# Patient Record
Sex: MALE | Race: BLACK OR AFRICAN AMERICAN | Employment: OTHER | URBAN - METROPOLITAN AREA
[De-identification: names, ages, dates, MRNs, and addresses within clinical notes are randomized per-mention and may not be internally consistent; named-entity substitution may affect disease eponyms.]

---

## 2017-06-21 ENCOUNTER — TELEPHONE (OUTPATIENT)
Dept: CARDIOTHORACIC SURGERY | Age: 67
End: 2017-06-21

## 2017-06-21 DIAGNOSIS — I71.20 THORACIC AORTIC ANEURYSM WITHOUT RUPTURE: Primary | ICD-10-CM

## 2017-06-30 ENCOUNTER — HOSPITAL ENCOUNTER (OUTPATIENT)
Dept: CT IMAGING | Age: 67
Discharge: HOME | End: 2017-06-30
Attending: THORACIC SURGERY (CARDIOTHORACIC VASCULAR SURGERY) | Admitting: THORACIC SURGERY (CARDIOTHORACIC VASCULAR SURGERY)

## 2017-06-30 DIAGNOSIS — I71.20 THORACIC AORTIC ANEURYSM WITHOUT RUPTURE: ICD-10-CM

## 2017-06-30 LAB
BUN BLDV-MCNC: 17 MG/DL (ref 7–20)
CREAT SERPL-MCNC: 0.9 MG/DL (ref 0.8–1.3)
GFR AFRICAN AMERICAN: >60
GFR NON-AFRICAN AMERICAN: >60

## 2017-08-30 ENCOUNTER — TELEPHONE (OUTPATIENT)
Dept: CARDIOTHORACIC SURGERY | Age: 67
End: 2017-08-30

## 2018-10-09 ENCOUNTER — HOSPITAL ENCOUNTER (EMERGENCY)
Age: 68
Discharge: HOME | End: 2018-10-10
Attending: EMERGENCY MEDICINE
Payer: COMMERCIAL

## 2018-10-09 ENCOUNTER — APPOINTMENT (OUTPATIENT)
Dept: CT IMAGING | Age: 68
End: 2018-10-09
Payer: COMMERCIAL

## 2018-10-09 DIAGNOSIS — R11.2 NAUSEA AND VOMITING, INTRACTABILITY OF VOMITING NOT SPECIFIED, UNSPECIFIED VOMITING TYPE: ICD-10-CM

## 2018-10-09 DIAGNOSIS — K52.9 COLITIS: Primary | ICD-10-CM

## 2018-10-09 LAB
A/G RATIO: 1.5 (ref 1.1–2.2)
ALBUMIN SERPL-MCNC: 4.5 G/DL (ref 3.4–5)
ALP BLD-CCNC: 86 U/L (ref 40–129)
ALT SERPL-CCNC: 26 U/L (ref 10–40)
ANION GAP SERPL CALCULATED.3IONS-SCNC: 13 MMOL/L (ref 3–16)
AST SERPL-CCNC: 24 U/L (ref 15–37)
BASOPHILS ABSOLUTE: 0 K/UL (ref 0–0.2)
BASOPHILS RELATIVE PERCENT: 0.3 %
BILIRUB SERPL-MCNC: <0.2 MG/DL (ref 0–1)
BUN BLDV-MCNC: <2 MG/DL (ref 7–20)
CALCIUM SERPL-MCNC: 9.3 MG/DL (ref 8.3–10.6)
CHLORIDE BLD-SCNC: 102 MMOL/L (ref 99–110)
CO2: 27 MMOL/L (ref 21–32)
CREAT SERPL-MCNC: 1.1 MG/DL (ref 0.8–1.3)
EOSINOPHILS ABSOLUTE: 0 K/UL (ref 0–0.6)
EOSINOPHILS RELATIVE PERCENT: 0.3 %
GFR AFRICAN AMERICAN: >60
GFR NON-AFRICAN AMERICAN: >60
GLOBULIN: 3 G/DL
GLUCOSE BLD-MCNC: 121 MG/DL (ref 70–99)
HCT VFR BLD CALC: 37.1 % (ref 40.5–52.5)
HEMOGLOBIN: 12.4 G/DL (ref 13.5–17.5)
LACTIC ACID: 2 MMOL/L (ref 0.4–2)
LIPASE: 25 U/L (ref 13–60)
LYMPHOCYTES ABSOLUTE: 0.2 K/UL (ref 1–5.1)
LYMPHOCYTES RELATIVE PERCENT: 2.3 %
MCH RBC QN AUTO: 30.6 PG (ref 26–34)
MCHC RBC AUTO-ENTMCNC: 33.4 G/DL (ref 31–36)
MCV RBC AUTO: 91.4 FL (ref 80–100)
MONOCYTES ABSOLUTE: 0.5 K/UL (ref 0–1.3)
MONOCYTES RELATIVE PERCENT: 5.3 %
NEUTROPHILS ABSOLUTE: 8.9 K/UL (ref 1.7–7.7)
NEUTROPHILS RELATIVE PERCENT: 91.8 %
PDW BLD-RTO: 14 % (ref 12.4–15.4)
PLATELET # BLD: 113 K/UL (ref 135–450)
PMV BLD AUTO: 9.5 FL (ref 5–10.5)
POTASSIUM REFLEX MAGNESIUM: 4.1 MMOL/L (ref 3.5–5.1)
RBC # BLD: 4.06 M/UL (ref 4.2–5.9)
REASON FOR REJECTION: NORMAL
REJECTED TEST: NORMAL
SODIUM BLD-SCNC: 142 MMOL/L (ref 136–145)
TOTAL PROTEIN: 7.5 G/DL (ref 6.4–8.2)
WBC # BLD: 9.7 K/UL (ref 4–11)

## 2018-10-09 PROCEDURE — 96375 TX/PRO/DX INJ NEW DRUG ADDON: CPT

## 2018-10-09 PROCEDURE — 74177 CT ABD & PELVIS W/CONTRAST: CPT

## 2018-10-09 PROCEDURE — 36415 COLL VENOUS BLD VENIPUNCTURE: CPT

## 2018-10-09 PROCEDURE — 6360000002 HC RX W HCPCS: Performed by: PHYSICIAN ASSISTANT

## 2018-10-09 PROCEDURE — 6360000004 HC RX CONTRAST MEDICATION: Performed by: EMERGENCY MEDICINE

## 2018-10-09 PROCEDURE — 83690 ASSAY OF LIPASE: CPT

## 2018-10-09 PROCEDURE — S0028 INJECTION, FAMOTIDINE, 20 MG: HCPCS | Performed by: PHYSICIAN ASSISTANT

## 2018-10-09 PROCEDURE — 85025 COMPLETE CBC W/AUTO DIFF WBC: CPT

## 2018-10-09 PROCEDURE — 80053 COMPREHEN METABOLIC PANEL: CPT

## 2018-10-09 PROCEDURE — 99285 EMERGENCY DEPT VISIT HI MDM: CPT

## 2018-10-09 PROCEDURE — 2580000003 HC RX 258: Performed by: PHYSICIAN ASSISTANT

## 2018-10-09 PROCEDURE — 96374 THER/PROPH/DIAG INJ IV PUSH: CPT

## 2018-10-09 PROCEDURE — 2500000003 HC RX 250 WO HCPCS: Performed by: PHYSICIAN ASSISTANT

## 2018-10-09 PROCEDURE — 83605 ASSAY OF LACTIC ACID: CPT

## 2018-10-09 RX ORDER — ONDANSETRON 2 MG/ML
4 INJECTION INTRAMUSCULAR; INTRAVENOUS ONCE
Status: COMPLETED | OUTPATIENT
Start: 2018-10-09 | End: 2018-10-09

## 2018-10-09 RX ORDER — 0.9 % SODIUM CHLORIDE 0.9 %
1000 INTRAVENOUS SOLUTION INTRAVENOUS ONCE
Status: COMPLETED | OUTPATIENT
Start: 2018-10-09 | End: 2018-10-10

## 2018-10-09 RX ADMIN — ONDANSETRON HYDROCHLORIDE 4 MG: 2 SOLUTION INTRAMUSCULAR; INTRAVENOUS at 22:28

## 2018-10-09 RX ADMIN — IOPAMIDOL 75 ML: 755 INJECTION, SOLUTION INTRAVENOUS at 22:54

## 2018-10-09 RX ADMIN — FAMOTIDINE 20 MG: 10 INJECTION, SOLUTION INTRAVENOUS at 22:28

## 2018-10-09 RX ADMIN — SODIUM CHLORIDE 1000 ML: 9 INJECTION, SOLUTION INTRAVENOUS at 22:28

## 2018-10-09 ASSESSMENT — PAIN DESCRIPTION - LOCATION: LOCATION: ABDOMEN

## 2018-10-10 VITALS
HEART RATE: 85 BPM | BODY MASS INDEX: 22.43 KG/M2 | DIASTOLIC BLOOD PRESSURE: 77 MMHG | RESPIRATION RATE: 14 BRPM | OXYGEN SATURATION: 90 % | WEIGHT: 148 LBS | TEMPERATURE: 97.8 F | HEIGHT: 68 IN | SYSTOLIC BLOOD PRESSURE: 139 MMHG

## 2018-10-10 PROCEDURE — 6370000000 HC RX 637 (ALT 250 FOR IP): Performed by: EMERGENCY MEDICINE

## 2018-10-10 RX ORDER — DICYCLOMINE HYDROCHLORIDE 10 MG/1
20 CAPSULE ORAL EVERY 6 HOURS PRN
Qty: 20 CAPSULE | Refills: 0 | Status: SHIPPED | OUTPATIENT
Start: 2018-10-10 | End: 2021-04-29 | Stop reason: ALTCHOICE

## 2018-10-10 RX ORDER — ONDANSETRON 4 MG/1
4 TABLET, FILM COATED ORAL EVERY 8 HOURS PRN
Qty: 10 TABLET | Refills: 0 | Status: SHIPPED | OUTPATIENT
Start: 2018-10-10 | End: 2021-04-29 | Stop reason: ALTCHOICE

## 2018-10-10 RX ORDER — CIPROFLOXACIN 500 MG/1
500 TABLET, FILM COATED ORAL 2 TIMES DAILY
Qty: 14 TABLET | Refills: 0 | Status: SHIPPED | OUTPATIENT
Start: 2018-10-10 | End: 2018-10-17

## 2018-10-10 RX ORDER — CIPROFLOXACIN 500 MG/1
500 TABLET, FILM COATED ORAL ONCE
Status: COMPLETED | OUTPATIENT
Start: 2018-10-10 | End: 2018-10-10

## 2018-10-10 RX ORDER — METRONIDAZOLE 500 MG/1
500 TABLET ORAL 3 TIMES DAILY
Qty: 21 TABLET | Refills: 0 | Status: SHIPPED | OUTPATIENT
Start: 2018-10-10 | End: 2018-10-17

## 2018-10-10 RX ORDER — METRONIDAZOLE 250 MG/1
500 TABLET ORAL ONCE
Status: COMPLETED | OUTPATIENT
Start: 2018-10-10 | End: 2018-10-10

## 2018-10-10 RX ADMIN — CIPROFLOXACIN 500 MG: 500 TABLET, FILM COATED ORAL at 00:41

## 2018-10-10 RX ADMIN — METRONIDAZOLE 500 MG: 250 TABLET, FILM COATED ORAL at 00:41

## 2018-10-10 ASSESSMENT — ENCOUNTER SYMPTOMS
ABDOMINAL PAIN: 0
NAUSEA: 1
RESPIRATORY NEGATIVE: 1
SHORTNESS OF BREATH: 0
DIARRHEA: 1
VOMITING: 1
COLOR CHANGE: 0
COUGH: 0
CONSTIPATION: 0

## 2019-08-25 ENCOUNTER — HOSPITAL ENCOUNTER (EMERGENCY)
Age: 69
Discharge: HOME | End: 2019-08-25
Attending: EMERGENCY MEDICINE
Payer: COMMERCIAL

## 2019-08-25 ENCOUNTER — APPOINTMENT (OUTPATIENT)
Dept: CT IMAGING | Age: 69
End: 2019-08-25
Payer: COMMERCIAL

## 2019-08-25 VITALS
HEART RATE: 64 BPM | TEMPERATURE: 97.7 F | OXYGEN SATURATION: 94 % | RESPIRATION RATE: 16 BRPM | DIASTOLIC BLOOD PRESSURE: 63 MMHG | SYSTOLIC BLOOD PRESSURE: 128 MMHG

## 2019-08-25 DIAGNOSIS — R10.9 ABDOMINAL PAIN, UNSPECIFIED ABDOMINAL LOCATION: Primary | ICD-10-CM

## 2019-08-25 DIAGNOSIS — R19.7 NAUSEA VOMITING AND DIARRHEA: ICD-10-CM

## 2019-08-25 DIAGNOSIS — R11.2 NAUSEA VOMITING AND DIARRHEA: ICD-10-CM

## 2019-08-25 DIAGNOSIS — K52.9 COLITIS: ICD-10-CM

## 2019-08-25 LAB
A/G RATIO: 1.6 (ref 1.1–2.2)
ALBUMIN SERPL-MCNC: 3.8 G/DL (ref 3.4–5)
ALP BLD-CCNC: 64 U/L (ref 40–129)
ALT SERPL-CCNC: 9 U/L (ref 10–40)
ANION GAP SERPL CALCULATED.3IONS-SCNC: 14 MMOL/L (ref 3–16)
AST SERPL-CCNC: 17 U/L (ref 15–37)
BASOPHILS ABSOLUTE: 0 K/UL (ref 0–0.2)
BASOPHILS RELATIVE PERCENT: 0 %
BILIRUB SERPL-MCNC: 0.5 MG/DL (ref 0–1)
BUN BLDV-MCNC: 19 MG/DL (ref 7–20)
CALCIUM SERPL-MCNC: 8.7 MG/DL (ref 8.3–10.6)
CHLORIDE BLD-SCNC: 103 MMOL/L (ref 99–110)
CO2: 23 MMOL/L (ref 21–32)
CREAT SERPL-MCNC: 1 MG/DL (ref 0.8–1.3)
EOSINOPHILS ABSOLUTE: 0.1 K/UL (ref 0–0.6)
EOSINOPHILS RELATIVE PERCENT: 1.4 %
GFR AFRICAN AMERICAN: >60
GFR NON-AFRICAN AMERICAN: >60
GLOBULIN: 2.4 G/DL
GLUCOSE BLD-MCNC: 116 MG/DL (ref 70–99)
HCT VFR BLD CALC: 40.7 % (ref 40.5–52.5)
HEMOGLOBIN: 13.3 G/DL (ref 13.5–17.5)
LIPASE: 20 U/L (ref 13–60)
LYMPHOCYTES ABSOLUTE: 0.7 K/UL (ref 1–5.1)
LYMPHOCYTES RELATIVE PERCENT: 10.1 %
MCH RBC QN AUTO: 30.6 PG (ref 26–34)
MCHC RBC AUTO-ENTMCNC: 32.6 G/DL (ref 31–36)
MCV RBC AUTO: 93.8 FL (ref 80–100)
MONOCYTES ABSOLUTE: 0.6 K/UL (ref 0–1.3)
MONOCYTES RELATIVE PERCENT: 8.6 %
NEUTROPHILS ABSOLUTE: 5.2 K/UL (ref 1.7–7.7)
NEUTROPHILS RELATIVE PERCENT: 79.9 %
PDW BLD-RTO: 14.5 % (ref 12.4–15.4)
PLATELET # BLD: 113 K/UL (ref 135–450)
PMV BLD AUTO: 10 FL (ref 5–10.5)
POTASSIUM SERPL-SCNC: 4.3 MMOL/L (ref 3.5–5.1)
RBC # BLD: 4.34 M/UL (ref 4.2–5.9)
SODIUM BLD-SCNC: 140 MMOL/L (ref 136–145)
TOTAL PROTEIN: 6.2 G/DL (ref 6.4–8.2)
WBC # BLD: 6.4 K/UL (ref 4–11)

## 2019-08-25 PROCEDURE — 80053 COMPREHEN METABOLIC PANEL: CPT

## 2019-08-25 PROCEDURE — 96361 HYDRATE IV INFUSION ADD-ON: CPT

## 2019-08-25 PROCEDURE — 2580000003 HC RX 258: Performed by: PHYSICIAN ASSISTANT

## 2019-08-25 PROCEDURE — 99284 EMERGENCY DEPT VISIT MOD MDM: CPT

## 2019-08-25 PROCEDURE — 6370000000 HC RX 637 (ALT 250 FOR IP): Performed by: PHYSICIAN ASSISTANT

## 2019-08-25 PROCEDURE — 83690 ASSAY OF LIPASE: CPT

## 2019-08-25 PROCEDURE — 85025 COMPLETE CBC W/AUTO DIFF WBC: CPT

## 2019-08-25 PROCEDURE — 6360000004 HC RX CONTRAST MEDICATION: Performed by: PHYSICIAN ASSISTANT

## 2019-08-25 PROCEDURE — 96374 THER/PROPH/DIAG INJ IV PUSH: CPT

## 2019-08-25 PROCEDURE — 6360000002 HC RX W HCPCS: Performed by: PHYSICIAN ASSISTANT

## 2019-08-25 PROCEDURE — 74177 CT ABD & PELVIS W/CONTRAST: CPT

## 2019-08-25 RX ORDER — ONDANSETRON 4 MG/1
4-8 TABLET, ORALLY DISINTEGRATING ORAL EVERY 12 HOURS PRN
Qty: 12 TABLET | Refills: 0 | Status: SHIPPED | OUTPATIENT
Start: 2019-08-25 | End: 2020-01-22

## 2019-08-25 RX ORDER — 0.9 % SODIUM CHLORIDE 0.9 %
500 INTRAVENOUS SOLUTION INTRAVENOUS ONCE
Status: COMPLETED | OUTPATIENT
Start: 2019-08-25 | End: 2019-08-25

## 2019-08-25 RX ORDER — CIPROFLOXACIN 500 MG/1
500 TABLET, FILM COATED ORAL ONCE
Status: COMPLETED | OUTPATIENT
Start: 2019-08-25 | End: 2019-08-25

## 2019-08-25 RX ORDER — ONDANSETRON 2 MG/ML
4 INJECTION INTRAMUSCULAR; INTRAVENOUS EVERY 30 MIN PRN
Status: DISCONTINUED | OUTPATIENT
Start: 2019-08-25 | End: 2019-08-25 | Stop reason: HOSPADM

## 2019-08-25 RX ORDER — METRONIDAZOLE 500 MG/1
500 TABLET ORAL 3 TIMES DAILY
Qty: 30 TABLET | Refills: 0 | Status: SHIPPED | OUTPATIENT
Start: 2019-08-25 | End: 2019-09-04

## 2019-08-25 RX ORDER — CIPROFLOXACIN 500 MG/1
500 TABLET, FILM COATED ORAL 2 TIMES DAILY
Qty: 20 TABLET | Refills: 0 | Status: SHIPPED | OUTPATIENT
Start: 2019-08-25 | End: 2019-09-04

## 2019-08-25 RX ORDER — METRONIDAZOLE 250 MG/1
500 TABLET ORAL ONCE
Status: COMPLETED | OUTPATIENT
Start: 2019-08-25 | End: 2019-08-25

## 2019-08-25 RX ADMIN — IOPAMIDOL 75 ML: 755 INJECTION, SOLUTION INTRAVENOUS at 20:25

## 2019-08-25 RX ADMIN — CIPROFLOXACIN 500 MG: 500 TABLET, FILM COATED ORAL at 21:36

## 2019-08-25 RX ADMIN — METRONIDAZOLE 500 MG: 250 TABLET, FILM COATED ORAL at 21:36

## 2019-08-25 RX ADMIN — ONDANSETRON 4 MG: 2 INJECTION INTRAMUSCULAR; INTRAVENOUS at 19:46

## 2019-08-25 RX ADMIN — SODIUM CHLORIDE 500 ML: 9 INJECTION, SOLUTION INTRAVENOUS at 19:40

## 2019-08-25 ASSESSMENT — PAIN SCALES - GENERAL: PAINLEVEL_OUTOF10: 7

## 2019-08-25 ASSESSMENT — ENCOUNTER SYMPTOMS
NAUSEA: 1
VOMITING: 1
ABDOMINAL PAIN: 1
BLOOD IN STOOL: 0
BACK PAIN: 0
SHORTNESS OF BREATH: 0
DIARRHEA: 1

## 2019-08-25 NOTE — ED PROVIDER NOTES
mouth daily      carbamide peroxide (DEBROX) 6.5 % otic solution Place 5 drops into the right ear 2 times daily      cetirizine (ZYRTEC) 10 MG tablet Take 10 mg by mouth daily      docusate sodium (COLACE) 100 MG capsule Take 100 mg by mouth 2 times daily      ferrous sulfate 325 (65 FE) MG tablet Take 325 mg by mouth daily (with breakfast)      ! ! ondansetron (ZOFRAN ODT) 4 MG disintegrating tablet Take 1 tablet by mouth every 8 hours as needed for Nausea, Disp-20 tablet, R-0      clopidogrel (PLAVIX) 75 MG tablet Take 1 tablet by mouth daily. , Disp-30 tablet, R-0      ALLERGY RELIEF 10 MG tablet Historical Med       !! - Potential duplicate medications found. Please discuss with provider. ALLERGIES     Patient has no known allergies. FAMILYHISTORY     History reviewed. No pertinent family history.        SOCIAL HISTORY       Social History     Socioeconomic History    Marital status:      Spouse name: None    Number of children: 0    Years of education: None    Highest education level: None   Occupational History    None   Social Needs    Financial resource strain: None    Food insecurity:     Worry: None     Inability: None    Transportation needs:     Medical: None     Non-medical: None   Tobacco Use    Smoking status: Never Smoker    Smokeless tobacco: Never Used   Substance and Sexual Activity    Alcohol use: No    Drug use: No    Sexual activity: Not Currently   Lifestyle    Physical activity:     Days per week: None     Minutes per session: None    Stress: None   Relationships    Social connections:     Talks on phone: None     Gets together: None     Attends Hinduism service: None     Active member of club or organization: None     Attends meetings of clubs or organizations: None     Relationship status: None    Intimate partner violence:     Fear of current or ex partner: None     Emotionally abused: None     Physically abused: None     Forced sexual activity: None Other Topics Concern    None   Social History Narrative    None       SCREENINGS             PHYSICAL EXAM    (up to 7 for level 4, 8 or more for level 5)     ED Triage Vitals [08/25/19 1856]   BP Temp Temp src Pulse Resp SpO2 Height Weight   125/61 97.7 °F (36.5 °C) -- 65 16 99 % -- --       Physical Exam   Constitutional: He is oriented to person, place, and time. Chronically ill-appearing   HENT:   Head: Atraumatic. Eyes: Right eye exhibits no discharge. Left eye exhibits no discharge. Neck: Normal range of motion. Cardiovascular: Normal rate, regular rhythm and normal heart sounds. Exam reveals no gallop and no friction rub. No murmur heard. Pulmonary/Chest: Effort normal and breath sounds normal. No stridor. No respiratory distress. He has no wheezes. He has no rales. Abdominal: Soft. Bowel sounds are normal. He exhibits no distension and no mass. There is no tenderness. There is no rebound and no guarding. No hernia. Musculoskeletal: Normal range of motion. He exhibits no edema, tenderness or deformity. Neurological: He is alert and oriented to person, place, and time. No cranial nerve deficit. Skin: Skin is warm and dry. No rash noted. He is not diaphoretic. No erythema. Psychiatric: He has a normal mood and affect. His behavior is normal.   Nursing note and vitals reviewed.       DIAGNOSTIC RESULTS   LABS:    Labs Reviewed   CBC WITH AUTO DIFFERENTIAL - Abnormal; Notable for the following components:       Result Value    Hemoglobin 13.3 (*)     Platelets 559 (*)     Lymphocytes Absolute 0.7 (*)     All other components within normal limits    Narrative:     Performed at:  [facility] [address], [facility], [address]   Phone [phone]   COMPREHENSIVE METABOLIC PANEL - Abnormal; Notable for the following components:    Glucose 116 (*)     Total Protein 6.2 (*)     ALT 9 (*)     All other components within normal limits    Narrative: Performed at:  [facility]  [address] [facility] [address]   Phone [phone]   LIPASE    Narrative:     Performed at:  [facility]  [address] [place],  [place], [address]   Phone [phone]   URINE RT REFLEX TO CULTURE       All other labs were within normal range or not returned as of this dictation. EKG: All EKG's are interpreted by the Emergency Department Physician in the absence of a cardiologist.  Please see their note for interpretation of EKG. RADIOLOGY:   Non-plain film images such as CT, Ultrasound and MRI are read by the radiologist. Plain radiographic images are visualized andpreliminarily interpreted by the  ED Provider with the below findings:        Interpretation perthe Radiologist below, if available at the time of this note:    CT ABDOMEN PELVIS W IV CONTRAST Additional Contrast? None   Final Result   1. Breathing motion blurs detail on results in misregistration. 2. Findings of mild colitis proximal descending colon possibly related to   infectious, inflammatory or ischemic etiology. 3. Prior partial right colon resection and reanastomosis. 4. Remainder of the exam is unremarkable. No results found.         PROCEDURES   Unless otherwise noted below, none     Procedures    CRITICAL CARE TIME   N/A    CONSULTS:  None      EMERGENCY DEPARTMENT COURSE and DIFFERENTIAL DIAGNOSIS/MDM:   Vitals:    Vitals:    08/25/19 2000 08/25/19 2030 08/25/19 2100 08/25/19 2130   BP: 111/61 133/65 123/68 128/63   Pulse:  68  64   Resp:  16  16   Temp:       SpO2: 98% 97% 96% 94%       Patient was given thefollowing medications:  Medications   0.9 % sodium chloride bolus (0 mLs Intravenous Stopped 8/25/19 2046)   iopamidol (ISOVUE-370) 76 % injection 75 mL (75 mLs Intravenous Given 8/25/19 2025)   metroNIDAZOLE (FLAGYL) tablet 500 mg (500 mg Oral Given 8/25/19 2136)   ciprofloxacin (CIPRO) tablet 500 mg (500 mg Oral

## 2019-08-25 NOTE — ED NOTES
Bed: 07  Expected date:   Expected time:   Means of arrival:   Comments:  Hernandez Sheets after merari Green RN  08/25/19 6923

## 2020-01-22 ENCOUNTER — APPOINTMENT (OUTPATIENT)
Dept: CT IMAGING | Age: 70
End: 2020-01-22
Payer: COMMERCIAL

## 2020-01-22 ENCOUNTER — HOSPITAL ENCOUNTER (EMERGENCY)
Age: 70
Discharge: HOME | End: 2020-01-22
Attending: EMERGENCY MEDICINE
Payer: COMMERCIAL

## 2020-01-22 VITALS
RESPIRATION RATE: 16 BRPM | DIASTOLIC BLOOD PRESSURE: 77 MMHG | SYSTOLIC BLOOD PRESSURE: 153 MMHG | HEART RATE: 72 BPM | TEMPERATURE: 98.2 F | OXYGEN SATURATION: 98 %

## 2020-01-22 LAB
A/G RATIO: 1.3 (ref 1.1–2.2)
ALBUMIN SERPL-MCNC: 4 G/DL (ref 3.4–5)
ALP BLD-CCNC: 63 U/L (ref 40–129)
ALT SERPL-CCNC: 16 U/L (ref 10–40)
ANION GAP SERPL CALCULATED.3IONS-SCNC: 10 MMOL/L (ref 3–16)
AST SERPL-CCNC: 14 U/L (ref 15–37)
BASOPHILS ABSOLUTE: 0 K/UL (ref 0–0.2)
BASOPHILS RELATIVE PERCENT: 0.6 %
BILIRUB SERPL-MCNC: 0.8 MG/DL (ref 0–1)
BILIRUBIN URINE: NEGATIVE
BLOOD, URINE: NEGATIVE
BUN BLDV-MCNC: 13 MG/DL (ref 7–20)
CALCIUM SERPL-MCNC: 9.3 MG/DL (ref 8.3–10.6)
CHLORIDE BLD-SCNC: 101 MMOL/L (ref 99–110)
CLARITY: CLEAR
CO2: 28 MMOL/L (ref 21–32)
COLOR: YELLOW
CREAT SERPL-MCNC: 0.9 MG/DL (ref 0.8–1.3)
EOSINOPHILS ABSOLUTE: 0.1 K/UL (ref 0–0.6)
EOSINOPHILS RELATIVE PERCENT: 0.9 %
GFR AFRICAN AMERICAN: >60
GFR NON-AFRICAN AMERICAN: >60
GLOBULIN: 3.2 G/DL
GLUCOSE BLD-MCNC: 97 MG/DL (ref 70–99)
GLUCOSE URINE: NEGATIVE MG/DL
HCT VFR BLD CALC: 41.2 % (ref 40.5–52.5)
HEMOGLOBIN: 13.9 G/DL (ref 13.5–17.5)
KETONES, URINE: NEGATIVE MG/DL
LACTIC ACID: 0.8 MMOL/L (ref 0.4–2)
LEUKOCYTE ESTERASE, URINE: NEGATIVE
LIPASE: 13 U/L (ref 13–60)
LYMPHOCYTES ABSOLUTE: 1.8 K/UL (ref 1–5.1)
LYMPHOCYTES RELATIVE PERCENT: 31.8 %
MCH RBC QN AUTO: 31.4 PG (ref 26–34)
MCHC RBC AUTO-ENTMCNC: 33.7 G/DL (ref 31–36)
MCV RBC AUTO: 93.1 FL (ref 80–100)
MICROSCOPIC EXAMINATION: NORMAL
MONOCYTES ABSOLUTE: 0.5 K/UL (ref 0–1.3)
MONOCYTES RELATIVE PERCENT: 8.1 %
NEUTROPHILS ABSOLUTE: 3.3 K/UL (ref 1.7–7.7)
NEUTROPHILS RELATIVE PERCENT: 58.6 %
NITRITE, URINE: NEGATIVE
PDW BLD-RTO: 13.9 % (ref 12.4–15.4)
PH UA: 7.5 (ref 5–8)
PLATELET # BLD: 145 K/UL (ref 135–450)
PMV BLD AUTO: 9.5 FL (ref 5–10.5)
POTASSIUM SERPL-SCNC: 5 MMOL/L (ref 3.5–5.1)
PROTEIN UA: NEGATIVE MG/DL
RBC # BLD: 4.43 M/UL (ref 4.2–5.9)
SODIUM BLD-SCNC: 139 MMOL/L (ref 136–145)
SPECIFIC GRAVITY UA: >1.03 (ref 1–1.03)
TOTAL PROTEIN: 7.2 G/DL (ref 6.4–8.2)
URINE REFLEX TO CULTURE: NORMAL
URINE TYPE: NORMAL
UROBILINOGEN, URINE: 0.2 E.U./DL
WBC # BLD: 5.7 K/UL (ref 4–11)

## 2020-01-22 PROCEDURE — 85025 COMPLETE CBC W/AUTO DIFF WBC: CPT

## 2020-01-22 PROCEDURE — 2580000003 HC RX 258: Performed by: EMERGENCY MEDICINE

## 2020-01-22 PROCEDURE — 83690 ASSAY OF LIPASE: CPT

## 2020-01-22 PROCEDURE — 96374 THER/PROPH/DIAG INJ IV PUSH: CPT

## 2020-01-22 PROCEDURE — 99284 EMERGENCY DEPT VISIT MOD MDM: CPT

## 2020-01-22 PROCEDURE — 81003 URINALYSIS AUTO W/O SCOPE: CPT

## 2020-01-22 PROCEDURE — 80053 COMPREHEN METABOLIC PANEL: CPT

## 2020-01-22 PROCEDURE — 74177 CT ABD & PELVIS W/CONTRAST: CPT

## 2020-01-22 PROCEDURE — 6360000002 HC RX W HCPCS: Performed by: EMERGENCY MEDICINE

## 2020-01-22 PROCEDURE — 83605 ASSAY OF LACTIC ACID: CPT

## 2020-01-22 PROCEDURE — 6360000004 HC RX CONTRAST MEDICATION: Performed by: EMERGENCY MEDICINE

## 2020-01-22 RX ORDER — ONDANSETRON 2 MG/ML
4 INJECTION INTRAMUSCULAR; INTRAVENOUS ONCE
Status: COMPLETED | OUTPATIENT
Start: 2020-01-22 | End: 2020-01-22

## 2020-01-22 RX ORDER — 0.9 % SODIUM CHLORIDE 0.9 %
1000 INTRAVENOUS SOLUTION INTRAVENOUS ONCE
Status: COMPLETED | OUTPATIENT
Start: 2020-01-22 | End: 2020-01-22

## 2020-01-22 RX ORDER — ONDANSETRON 4 MG/1
4 TABLET, ORALLY DISINTEGRATING ORAL EVERY 8 HOURS PRN
Qty: 14 TABLET | Refills: 0 | Status: SHIPPED | OUTPATIENT
Start: 2020-01-22 | End: 2021-04-29 | Stop reason: ALTCHOICE

## 2020-01-22 RX ADMIN — ONDANSETRON 4 MG: 2 INJECTION INTRAMUSCULAR; INTRAVENOUS at 18:46

## 2020-01-22 RX ADMIN — SODIUM CHLORIDE 1000 ML: 9 INJECTION, SOLUTION INTRAVENOUS at 18:46

## 2020-01-22 RX ADMIN — IOPAMIDOL 75 ML: 755 INJECTION, SOLUTION INTRAVENOUS at 18:57

## 2020-01-22 ASSESSMENT — PAIN SCALES - WONG BAKER: WONGBAKER_NUMERICALRESPONSE: 6

## 2020-01-23 NOTE — ED PROVIDER NOTES
[address] PROVIDER NOTE    Patient Identification  Pt Name: Nola Silverman  MRN: [id number]  Gusgfsudha 1950  Date of evaluation: 1/22/2020  Provider: Fide Yun DO  PCP: Referring Not In System (Inactive)    Chief Complaint  Abdominal Pain (lower abd pain since sunday with comiting, patients wife reporting that this happens every 5/6 months and noone can figure out whats wrong with him)      HPI  (History provided by patient, spouse)  This is a 78 y.o. male with pertinent past medical history of HTN, TBI who was brought in by family for abdominal pain and vomiting. Patient states he feels as if he has to spit, which progresses to episodes of nonbloody vomiting. Onset 2 days ago. Associated with crampy and achy LLQ abdominal pain which radiates into the left hip. Pain is minimally worsened with movement, nothing makes it better. Spouse reports patient has had similar pain every 6 months over the past several years and \"nobody can figure out what's causing it. \" They have upcoming appointment for colonoscopy on 2/11/20. ROS    Const:  No fevers, no chills, no generalized weakness  Skin:  No rash, no lesions  Eyes:  No visual changes, no blurry or double vision, no pain  ENT:  No sore throat, no difficulty swallowing, no ear pan, +sinus congestion  Card:  No chest pain, no palpitations, no edema  Resp:  No shortness of breath, no cough, no wheezing  Abd:  +abdominal pain, +nausea, +vomiting, no diarrhea  Genitourinary:  No dysuria, no hematuria  MSK:  No joint pain, no myalgia  Neuro:  No focal weakness, no headache, no paresthesia    All other systems reviewed and negative unless otherwise noted in HPI        I have reviewed the following nursing documentation:  Allergies: Patient has no known allergies.     Past medical history:   Past Medical History:   Diagnosis Date    Allergic rhinitis     Cerebral artery occlusion with cerebral infarction [facility]) 2016    Injury brain[facility] traumatic [facility])      Past surgical history:   Past Surgical History:   Procedure Laterality Date    COLONOSCOPY      UPPER GASTROINTESTINAL ENDOSCOPY  08/15/2016       Home medications:   Discharge Medication List as of 1/22/2020  9:06 PM      CONTINUE these medications which have NOT CHANGED    Details   dicyclomine (BENTYL) 10 MG capsule Take 2 capsules by mouth every 6 hours as needed (cramps), Disp-20 capsule, R-0Print      ondansetron (ZOFRAN) 4 MG tablet Take 1 tablet by mouth every 8 hours as needed for Nausea, Disp-10 tablet, R-0Print      aspirin 325 MG EC tablet Take 81 mg by mouth dailyHistorical Med      omeprazole (PRILOSEC) 20 MG delayed release capsule Take 20 mg by mouth daily      fluticasone (FLONASE) 50 MCG/ACT nasal spray 1 spray by Nasal route daily, Disp-1 Bottle, R-0      acetaminophen (APAP EXTRA STRENGTH) 500 MG tablet Take 1 tablet by mouth every 6 hours as needed for Pain, Disp-30 tablet, R-0      pantoprazole (PROTONIX) 40 MG tablet Take 1 tablet by mouth 2 times daily, Disp-60 tablet, R-1      atorvastatin (LIPITOR) 80 MG tablet Take 80 mg by mouth daily      carbamide peroxide (DEBROX) 6.5 % otic solution Place 5 drops into the right ear 2 times daily      cetirizine (ZYRTEC) 10 MG tablet Take 10 mg by mouth daily      docusate sodium (COLACE) 100 MG capsule Take 100 mg by mouth 2 times daily      ferrous sulfate 325 (65 FE) MG tablet Take 325 mg by mouth daily (with breakfast)      clopidogrel (PLAVIX) 75 MG tablet Take 1 tablet by mouth daily. , Disp-30 tablet, R-0      ALLERGY RELIEF 10 MG tablet Historical Med             Social history:  reports that he has never smoked. He has never used smokeless tobacco. He reports that he does not drink alcohol or use drugs. Family history:  History reviewed. No pertinent family history.       Exam  ED Triage Vitals [01/22/20 1631]   BP Temp Temp src Pulse Resp SpO2 Height Weight   (!) 188/82 98.2 °F (36.8 °C) -- 78 17 97 % -- -- Nursing note and vitals reviewed. Constitutional: Well developed, well nourished. Non-toxic in appearance. HENT:      Head: Normocephalic and atraumatic. Ears: External ears normal.      Nose: Nose normal.     Mouth: Membrane mucosa moist and pink. Eyes: Anicteric sclera. No discharge. Neck: Supple. Trachea midline. Cardiovascular: RRR; no murmurs, rubs, or gallops. Pulmonary/Chest: Effort normal. No respiratory distress. CTAB. No stridor. No wheezes. No rales. Abdominal: Soft. No distension. Mild tenderness to LLQ and inguinal region, no rebound, no rigidity, no guarding. No focal RLQ tenderness or Rovsing. Musculoskeletal: Moves all extremities. No gross deformity. Neurological: Alert and oriented. Face symmetric. Speech is clear. Skin: Warm and dry. No rash. Psychiatric: Normal mood and affect. Behavior is normal.    Procedures        Radiology  CT ABDOMEN PELVIS W IV CONTRAST Additional Contrast? None   Final Result   1. No acute abdominopelvic process. 2.  Mild colonic diverticulosis without diverticulitis. Increased stool   burden. 3.  Left renal cyst.      4.  Right inguinal hernia containing fat extending to the scrotal sac.      5.  Mild degenerative changes in the hips. Other findings as above.              Labs  Results for orders placed or performed during the hospital encounter of 01/22/20   CBC Auto Differential   Result Value Ref Range    WBC 5.7 4.0 - 11.0 K/uL    RBC 4.43 4.20 - 5.90 M/uL    Hemoglobin 13.9 13.5 - 17.5 g/dL    Hematocrit 41.2 40.5 - 52.5 %    MCV 93.1 80.0 - 100.0 fL    MCH 31.4 26.0 - 34.0 pg    MCHC 33.7 31.0 - 36.0 g/dL    RDW 13.9 12.4 - 15.4 %    Platelets 227 173 - 356 K/uL    MPV 9.5 5.0 - 10.5 fL    Neutrophils % 58.6 %    Lymphocytes % 31.8 %    Monocytes % 8.1 %    Eosinophils % 0.9 %    Basophils % 0.6 %    Neutrophils Absolute 3.3 1.7 - 7.7 K/uL    Lymphocytes Absolute 1.8 1.0 - 5.1 K/uL    Monocytes Absolute 0.5 0.0 - 1.3 K/uL Eosinophils Absolute 0.1 0.0 - 0.6 K/uL    Basophils Absolute 0.0 0.0 - 0.2 K/uL   Comprehensive Metabolic Panel   Result Value Ref Range    Sodium 139 136 - 145 mmol/L    Potassium 5.0 3.5 - 5.1 mmol/L    Chloride 101 99 - 110 mmol/L    CO2 28 21 - 32 mmol/L    Anion Gap 10 3 - 16    Glucose 97 70 - 99 mg/dL    BUN 13 7 - 20 mg/dL    CREATININE 0.9 0.8 - 1.3 mg/dL    GFR Non-African American >60 >60    GFR African American >60 >60    Calcium 9.3 8.3 - 10.6 mg/dL    Total Protein 7.2 6.4 - 8.2 g/dL    Alb 4.0 3.4 - 5.0 g/dL    Albumin/Globulin Ratio 1.3 1.1 - 2.2    Total Bilirubin 0.8 0.0 - 1.0 mg/dL    Alkaline Phosphatase 63 40 - 129 U/L    ALT 16 10 - 40 U/L    AST 14 (L) 15 - 37 U/L    Globulin 3.2 g/dL   Lipase   Result Value Ref Range    Lipase 13.0 13.0 - 60.0 U/L   Urinalysis Reflex to Culture   Result Value Ref Range    Color, UA YELLOW Straw/Yellow    Clarity, UA Clear Clear    Glucose, Ur Negative Negative mg/dL    Bilirubin Urine Negative Negative    Ketones, Urine Negative Negative mg/dL    Specific Gravity, UA >1.030 1.005 - 1.030    Blood, Urine Negative Negative    pH, UA 7.5 5.0 - 8.0    Protein, UA Negative Negative mg/dL    Urobilinogen, Urine 0.2 <2.0 E.U./dL    Nitrite, Urine Negative Negative    Leukocyte Esterase, Urine Negative Negative    Microscopic Examination Not Indicated     Urine Type NotGiven     Urine Reflex to Culture Not Indicated    Lactic acid, plasma   Result Value Ref Range    Lactic Acid 0.8 0.4 - 2.0 mmol/L       Screenings           MDM and ED Course    Patient afebrile and nontoxic. No distress. No peritoneal signs on exam. No RLQ tenderness to suggest acute appendicitis. CT imaging was without evidence of cholecystitis or diverticulitis. Nothing to suggest obstruction or perforation. Mesenteric ischemia considered, lactic acid normal, pain not related to eating, by history and exam this is felt much less likely diagnosis. UA not indicative of infection.  Lab workup reassuring. CT scan does note some constipation which may be contributing to pain. Patient's symptoms improved with ED treatment, at which point h was eager for discharge to home. [place] safe to discharge in care of spouse. Discussed importance of fluids and follow up with GI which they already have scheduled. Strict return precautions including worsened/changing abdominal pain, bloody vomitus or stools, fevers, CP or SOB were discussed. Final Impression  1. Acute abdominal pain    2. Nausea        Blood pressure (!) 153/77, pulse 72, temperature 98.2 °F (36.8 °C), resp. rate 16, SpO2 98 %. Disposition:  DISPOSITION Decision To Discharge 01/22/2020 09:02:49 PM      [facility]:  [facility]  [phone]  In 2 days  For your general medical care and recheck of your abdominal pain    Genna Barrera MD  [address] [id number] [phone]    In 1 week  For evaluation of your dental pain, please keep your upcoming appointment for colonoscopy      Discharge Medications:  Discharge Medication List as of 1/22/2020  9:06 PM          Discontinued Medications:  Discharge Medication List as of 1/22/2020  9:06 PM          This chart was generated using the [employer] dictation system. I created this record but it may contain dictation errors given the limitations of this technology.     Kevin Neely DO (electronically signed)  Attending Emergency Physician       Kevin Neely DO  01/23/20 1257

## 2020-01-23 NOTE — ED NOTES
VSS. IV removed. Reviewed discharge instructions, home meds, and follow up care with patient, verbalized understanding.       Kylah Estrada RN  01/22/20 7323

## 2020-01-23 NOTE — ED NOTES
Reviewed discharge instructions, home meds, and follow up care with patient, verbalized understanding.       Ariana Haro RN  01/22/20 5871

## 2021-04-29 ENCOUNTER — APPOINTMENT (OUTPATIENT)
Dept: CT IMAGING | Age: 71
End: 2021-04-29
Payer: COMMERCIAL

## 2021-04-29 ENCOUNTER — APPOINTMENT (OUTPATIENT)
Dept: GENERAL RADIOLOGY | Age: 71
End: 2021-04-29
Payer: COMMERCIAL

## 2021-04-29 ENCOUNTER — HOSPITAL ENCOUNTER (OUTPATIENT)
Age: 71
Setting detail: OBSERVATION
Discharge: HOME | End: 2021-04-30
Attending: EMERGENCY MEDICINE | Admitting: INTERNAL MEDICINE
Payer: COMMERCIAL

## 2021-04-29 DIAGNOSIS — R53.83 OTHER FATIGUE: Primary | ICD-10-CM

## 2021-04-29 DIAGNOSIS — R10.9 ABDOMINAL PAIN, UNSPECIFIED ABDOMINAL LOCATION: ICD-10-CM

## 2021-04-29 PROBLEM — G89.29 CHRONIC ABDOMINAL PAIN: Status: ACTIVE | Noted: 2021-04-29

## 2021-04-29 LAB
A/G RATIO: 1.6 (ref 1.1–2.2)
ALBUMIN SERPL-MCNC: 4.5 G/DL (ref 3.4–5)
ALP BLD-CCNC: 86 U/L (ref 40–129)
ALT SERPL-CCNC: 11 U/L (ref 10–40)
ANION GAP SERPL CALCULATED.3IONS-SCNC: 10 MMOL/L (ref 3–16)
APTT: 31.5 SEC (ref 24.2–36.2)
AST SERPL-CCNC: 15 U/L (ref 15–37)
BASOPHILS ABSOLUTE: 0 K/UL (ref 0–0.2)
BASOPHILS RELATIVE PERCENT: 0.3 %
BILIRUB SERPL-MCNC: 0.9 MG/DL (ref 0–1)
BILIRUBIN URINE: NEGATIVE
BLOOD, URINE: NEGATIVE
BUN BLDV-MCNC: 16 MG/DL (ref 7–20)
CALCIUM SERPL-MCNC: 9.4 MG/DL (ref 8.3–10.6)
CHLORIDE BLD-SCNC: 101 MMOL/L (ref 99–110)
CLARITY: CLEAR
CO2: 29 MMOL/L (ref 21–32)
COLOR: YELLOW
CREAT SERPL-MCNC: 1 MG/DL (ref 0.8–1.3)
EKG ATRIAL RATE: 82 BPM
EKG DIAGNOSIS: NORMAL
EKG P AXIS: 64 DEGREES
EKG P-R INTERVAL: 152 MS
EKG Q-T INTERVAL: 386 MS
EKG QRS DURATION: 80 MS
EKG QTC CALCULATION (BAZETT): 450 MS
EKG R AXIS: 63 DEGREES
EKG T AXIS: 75 DEGREES
EKG VENTRICULAR RATE: 82 BPM
EOSINOPHILS ABSOLUTE: 0 K/UL (ref 0–0.6)
EOSINOPHILS RELATIVE PERCENT: 0.1 %
GFR AFRICAN AMERICAN: >60
GFR NON-AFRICAN AMERICAN: >60
GLOBULIN: 2.8 G/DL
GLUCOSE BLD-MCNC: 116 MG/DL (ref 70–99)
GLUCOSE URINE: NEGATIVE MG/DL
HCT VFR BLD CALC: 39.9 % (ref 40.5–52.5)
HEMOGLOBIN: 13.2 G/DL (ref 13.5–17.5)
INR BLD: 1.12 (ref 0.86–1.14)
KETONES, URINE: NEGATIVE MG/DL
LACTIC ACID, SEPSIS: 1.9 MMOL/L (ref 0.4–1.9)
LEUKOCYTE ESTERASE, URINE: NEGATIVE
LIPASE: 22 U/L (ref 13–60)
LYMPHOCYTES ABSOLUTE: 0.8 K/UL (ref 1–5.1)
LYMPHOCYTES RELATIVE PERCENT: 10.6 %
MCH RBC QN AUTO: 29.9 PG (ref 26–34)
MCHC RBC AUTO-ENTMCNC: 33 G/DL (ref 31–36)
MCV RBC AUTO: 90.5 FL (ref 80–100)
MICROSCOPIC EXAMINATION: NORMAL
MONOCYTES ABSOLUTE: 0.3 K/UL (ref 0–1.3)
MONOCYTES RELATIVE PERCENT: 4.6 %
NEUTROPHILS ABSOLUTE: 6.2 K/UL (ref 1.7–7.7)
NEUTROPHILS RELATIVE PERCENT: 84.4 %
NITRITE, URINE: NEGATIVE
PDW BLD-RTO: 13.7 % (ref 12.4–15.4)
PH UA: 8 (ref 5–8)
PLATELET # BLD: 174 K/UL (ref 135–450)
PMV BLD AUTO: 8.8 FL (ref 5–10.5)
POTASSIUM SERPL-SCNC: 3.6 MMOL/L (ref 3.5–5.1)
PRO-BNP: 117 PG/ML (ref 0–124)
PROTEIN UA: NEGATIVE MG/DL
PROTHROMBIN TIME: 13 SEC (ref 10–13.2)
RBC # BLD: 4.4 M/UL (ref 4.2–5.9)
SODIUM BLD-SCNC: 140 MMOL/L (ref 136–145)
SPECIFIC GRAVITY UA: 1.02 (ref 1–1.03)
TOTAL PROTEIN: 7.3 G/DL (ref 6.4–8.2)
TROPONIN: <0.01 NG/ML
URINE REFLEX TO CULTURE: NORMAL
URINE TYPE: NORMAL
UROBILINOGEN, URINE: 0.2 E.U./DL
WBC # BLD: 7.3 K/UL (ref 4–11)

## 2021-04-29 PROCEDURE — 74177 CT ABD & PELVIS W/CONTRAST: CPT

## 2021-04-29 PROCEDURE — 96374 THER/PROPH/DIAG INJ IV PUSH: CPT

## 2021-04-29 PROCEDURE — 87040 BLOOD CULTURE FOR BACTERIA: CPT

## 2021-04-29 PROCEDURE — 71045 X-RAY EXAM CHEST 1 VIEW: CPT

## 2021-04-29 PROCEDURE — 93005 ELECTROCARDIOGRAM TRACING: CPT | Performed by: PHYSICIAN ASSISTANT

## 2021-04-29 PROCEDURE — 2580000003 HC RX 258: Performed by: PHYSICIAN ASSISTANT

## 2021-04-29 PROCEDURE — 84484 ASSAY OF TROPONIN QUANT: CPT

## 2021-04-29 PROCEDURE — 2580000003 HC RX 258: Performed by: INTERNAL MEDICINE

## 2021-04-29 PROCEDURE — 83605 ASSAY OF LACTIC ACID: CPT

## 2021-04-29 PROCEDURE — 6360000004 HC RX CONTRAST MEDICATION: Performed by: EMERGENCY MEDICINE

## 2021-04-29 PROCEDURE — 83690 ASSAY OF LIPASE: CPT

## 2021-04-29 PROCEDURE — 6360000002 HC RX W HCPCS: Performed by: INTERNAL MEDICINE

## 2021-04-29 PROCEDURE — 81003 URINALYSIS AUTO W/O SCOPE: CPT

## 2021-04-29 PROCEDURE — 85610 PROTHROMBIN TIME: CPT

## 2021-04-29 PROCEDURE — 93010 ELECTROCARDIOGRAM REPORT: CPT | Performed by: INTERNAL MEDICINE

## 2021-04-29 PROCEDURE — G0378 HOSPITAL OBSERVATION PER HR: HCPCS

## 2021-04-29 PROCEDURE — 96375 TX/PRO/DX INJ NEW DRUG ADDON: CPT

## 2021-04-29 PROCEDURE — 85025 COMPLETE CBC W/AUTO DIFF WBC: CPT

## 2021-04-29 PROCEDURE — 70450 CT HEAD/BRAIN W/O DYE: CPT

## 2021-04-29 PROCEDURE — 6360000002 HC RX W HCPCS: Performed by: PHYSICIAN ASSISTANT

## 2021-04-29 PROCEDURE — 99285 EMERGENCY DEPT VISIT HI MDM: CPT

## 2021-04-29 PROCEDURE — 80053 COMPREHEN METABOLIC PANEL: CPT

## 2021-04-29 PROCEDURE — 83880 ASSAY OF NATRIURETIC PEPTIDE: CPT

## 2021-04-29 PROCEDURE — 96372 THER/PROPH/DIAG INJ SC/IM: CPT

## 2021-04-29 PROCEDURE — 85730 THROMBOPLASTIN TIME PARTIAL: CPT

## 2021-04-29 RX ORDER — SUCRALFATE 1 G/1
1 TABLET ORAL
Status: DISCONTINUED | OUTPATIENT
Start: 2021-04-29 | End: 2021-04-30 | Stop reason: HOSPADM

## 2021-04-29 RX ORDER — SODIUM CHLORIDE 9 MG/ML
25 INJECTION, SOLUTION INTRAVENOUS PRN
Status: DISCONTINUED | OUTPATIENT
Start: 2021-04-29 | End: 2021-04-30 | Stop reason: HOSPADM

## 2021-04-29 RX ORDER — ATORVASTATIN CALCIUM 80 MG/1
80 TABLET, FILM COATED ORAL NIGHTLY
Status: DISCONTINUED | OUTPATIENT
Start: 2021-04-29 | End: 2021-04-30 | Stop reason: HOSPADM

## 2021-04-29 RX ORDER — PANTOPRAZOLE SODIUM 40 MG/1
40 TABLET, DELAYED RELEASE ORAL
Status: DISCONTINUED | OUTPATIENT
Start: 2021-04-30 | End: 2021-04-30 | Stop reason: HOSPADM

## 2021-04-29 RX ORDER — SODIUM CHLORIDE 0.9 % (FLUSH) 0.9 %
5-40 SYRINGE (ML) INJECTION EVERY 12 HOURS SCHEDULED
Status: DISCONTINUED | OUTPATIENT
Start: 2021-04-29 | End: 2021-04-30 | Stop reason: HOSPADM

## 2021-04-29 RX ORDER — LABETALOL HYDROCHLORIDE 5 MG/ML
10 INJECTION, SOLUTION INTRAVENOUS EVERY 4 HOURS PRN
Status: DISCONTINUED | OUTPATIENT
Start: 2021-04-29 | End: 2021-04-30 | Stop reason: HOSPADM

## 2021-04-29 RX ORDER — AMLODIPINE BESYLATE 5 MG/1
2.5 TABLET ORAL DAILY
Status: DISCONTINUED | OUTPATIENT
Start: 2021-04-30 | End: 2021-04-30 | Stop reason: HOSPADM

## 2021-04-29 RX ORDER — ONDANSETRON 2 MG/ML
4 INJECTION INTRAMUSCULAR; INTRAVENOUS ONCE
Status: COMPLETED | OUTPATIENT
Start: 2021-04-29 | End: 2021-04-29

## 2021-04-29 RX ORDER — 0.9 % SODIUM CHLORIDE 0.9 %
1000 INTRAVENOUS SOLUTION INTRAVENOUS ONCE
Status: COMPLETED | OUTPATIENT
Start: 2021-04-29 | End: 2021-04-29

## 2021-04-29 RX ORDER — DOCUSATE SODIUM 100 MG/1
100 CAPSULE, LIQUID FILLED ORAL 2 TIMES DAILY
Status: CANCELLED | OUTPATIENT
Start: 2021-04-29

## 2021-04-29 RX ORDER — ASPIRIN 300 MG/1
300 SUPPOSITORY RECTAL DAILY
Status: DISCONTINUED | OUTPATIENT
Start: 2021-04-30 | End: 2021-04-30 | Stop reason: HOSPADM

## 2021-04-29 RX ORDER — ASPIRIN 81 MG/1
81 TABLET ORAL DAILY
Status: DISCONTINUED | OUTPATIENT
Start: 2021-04-30 | End: 2021-04-30 | Stop reason: HOSPADM

## 2021-04-29 RX ORDER — CALCIUM CARBONATE 200(500)MG
500 TABLET,CHEWABLE ORAL 3 TIMES DAILY PRN
Status: DISCONTINUED | OUTPATIENT
Start: 2021-04-29 | End: 2021-04-30 | Stop reason: HOSPADM

## 2021-04-29 RX ORDER — SODIUM CHLORIDE 0.9 % (FLUSH) 0.9 %
5-40 SYRINGE (ML) INJECTION PRN
Status: DISCONTINUED | OUTPATIENT
Start: 2021-04-29 | End: 2021-04-30 | Stop reason: HOSPADM

## 2021-04-29 RX ORDER — POLYETHYLENE GLYCOL 3350 17 G/17G
17 POWDER, FOR SOLUTION ORAL DAILY PRN
Status: DISCONTINUED | OUTPATIENT
Start: 2021-04-29 | End: 2021-04-30 | Stop reason: HOSPADM

## 2021-04-29 RX ORDER — LORAZEPAM 2 MG/ML
1 INJECTION INTRAMUSCULAR ONCE
Status: COMPLETED | OUTPATIENT
Start: 2021-04-29 | End: 2021-04-29

## 2021-04-29 RX ORDER — PROMETHAZINE HYDROCHLORIDE 25 MG/1
12.5 TABLET ORAL EVERY 6 HOURS PRN
Status: DISCONTINUED | OUTPATIENT
Start: 2021-04-29 | End: 2021-04-30 | Stop reason: HOSPADM

## 2021-04-29 RX ORDER — ONDANSETRON 2 MG/ML
4 INJECTION INTRAMUSCULAR; INTRAVENOUS EVERY 6 HOURS PRN
Status: DISCONTINUED | OUTPATIENT
Start: 2021-04-29 | End: 2021-04-30 | Stop reason: HOSPADM

## 2021-04-29 RX ORDER — ATORVASTATIN CALCIUM 80 MG/1
80 TABLET, FILM COATED ORAL DAILY
COMMUNITY

## 2021-04-29 RX ORDER — AMLODIPINE BESYLATE 10 MG/1
2.5 TABLET ORAL DAILY
COMMUNITY

## 2021-04-29 RX ADMIN — LORAZEPAM 1 MG: 2 INJECTION INTRAMUSCULAR; INTRAVENOUS at 14:57

## 2021-04-29 RX ADMIN — ENOXAPARIN SODIUM 40 MG: 40 INJECTION SUBCUTANEOUS at 20:44

## 2021-04-29 RX ADMIN — SODIUM CHLORIDE 1000 ML: 9 INJECTION, SOLUTION INTRAVENOUS at 14:55

## 2021-04-29 RX ADMIN — IOPAMIDOL 75 ML: 755 INJECTION, SOLUTION INTRAVENOUS at 15:52

## 2021-04-29 RX ADMIN — ONDANSETRON 4 MG: 2 INJECTION INTRAMUSCULAR; INTRAVENOUS at 14:56

## 2021-04-29 RX ADMIN — Medication 10 ML: at 20:44

## 2021-04-29 ASSESSMENT — ENCOUNTER SYMPTOMS
SHORTNESS OF BREATH: 0
ABDOMINAL PAIN: 1
COUGH: 0
NAUSEA: 0
RHINORRHEA: 0
VOMITING: 1
DIARRHEA: 0

## 2021-04-29 NOTE — ED PROVIDER NOTES
as a headache, and again per family this is patient's baseline for the past several years. There have been no reports of any diarrhea. No reports of any fever. No cough. No other history is able to be obtained at this time    Nursing Notes were all reviewed and agreed with or any disagreements were addressed in the HPI. REVIEW OF SYSTEMS    (2-9 systems for level 4, 10 or more for level 5)     Review of Systems   Constitutional: Negative for activity change, appetite change, chills and fever. HENT: Negative for congestion and rhinorrhea. Respiratory: Negative for cough and shortness of breath. Cardiovascular: Negative for chest pain. Gastrointestinal: Positive for abdominal pain and vomiting. Negative for diarrhea and nausea. Genitourinary: Negative for difficulty urinating, dysuria and hematuria. Neurological: Positive for headaches. Positives and Pertinent negatives as per HPI. Except as noted above in the ROS, all other systems were reviewed and negative.        PAST MEDICAL HISTORY     Past Medical History:   Diagnosis Date    Allergic rhinitis     Cerebral artery occlusion with cerebral infarction [facility]) 2016    Injury brain, traumatic ([facility] [address]          SURGICAL HISTORY     Past Surgical History:   Procedure Laterality Date    COLONOSCOPY      UPPER GASTROINTESTINAL ENDOSCOPY  08/15/2016         CURRENTMEDICATIONS       Previous Medications    ACETAMINOPHEN (APAP EXTRA STRENGTH) 500 MG TABLET    Take 1 tablet by mouth every 6 hours as needed for Pain    ALLERGY RELIEF 10 MG TABLET        AMLODIPINE (NORVASC) 10 MG TABLET    Take 10 mg by mouth daily    ASPIRIN 325 MG EC TABLET    Take 81 mg by mouth daily    ATORVASTATIN (LIPITOR) 80 MG TABLET    Take 80 mg by mouth daily    CARBAMIDE PEROXIDE (DEBROX) 6.5 % OTIC SOLUTION    Place 5 drops into the right ear 2 times daily    CETIRIZINE (ZYRTEC) 10 MG TABLET    Take 10 mg by mouth daily    CLOPIDOGREL (PLAVIX) 75 MG TABLET    Take 1 effort is normal. No respiratory distress. Breath sounds: Normal breath sounds. No wheezing or rales. Abdominal:      General: Bowel sounds are normal. There is no distension. Palpations: Abdomen is soft. There is no mass. Tenderness: There is generalized abdominal tenderness. There is no guarding. Musculoskeletal: Normal range of motion. Skin:     General: Skin is warm and dry. Neurological:      Mental Status: He is alert. Mental status is at baseline. Psychiatric:         Behavior: Behavior normal.         DIAGNOSTIC RESULTS   LABS:    Labs Reviewed   CULTURE, BLOOD 2   CULTURE, BLOOD 1   CBC WITH AUTO DIFFERENTIAL   COMPREHENSIVE METABOLIC PANEL   LIPASE   URINE RT REFLEX TO CULTURE   TROPONIN   APTT   PROTIME-INR   BRAIN NATRIURETIC PEPTIDE   LACTATE, SEPSIS   LACTATE, SEPSIS       All other labs were within normal range or not returned as of this dictation. EKG: All EKG's are interpreted by the Emergency Department Physician in the absence of a cardiologist.  Please see their note for interpretation of EKG. RADIOLOGY:   Non-plain film images such as CT, Ultrasound and MRI are read by the radiologist. Plain radiographic images are visualized and preliminarily interpreted by the ED Provider with the below findings:        Interpretation per the Radiologist below, if available at the time of this note:    CT HEAD WO CONTRAST    (Results Pending)   XR CHEST PORTABLE    (Results Pending)   CT ABDOMEN PELVIS W IV CONTRAST Additional Contrast? None    (Results Pending)     No results found.         PROCEDURES   Unless otherwise noted below, none     Procedures    CRITICAL CARE TIME   N/A    CONSULTS:  None      EMERGENCY DEPARTMENT COURSE and DIFFERENTIAL DIAGNOSIS/MDM:   Vitals:    Vitals:    04/29/21 1304   BP: (!) 147/81   Pulse: 81   Resp: 18   Temp: 98.1 °F (36.7 °C)   TempSrc: Oral   SpO2: 95%   Weight: 148 lb (67.1 kg)   Height: 5' 8\" (1.727 m)       Patient was given the following medications:  Medications   0.9 % sodium chloride bolus (has no administration in time range)   ondansetron (ZOFRAN) injection 4 mg (has no administration in time range)           Briefly, this is a 58-year-old male who presents emergency department today with family for concerns of abdominal pain, headaches, as well as vomiting. The family states that this has been ongoing intermittently for over the past 2 years, and patient has been seen several times for this. The patient started complaining of headache, abdominal pain with vomiting last night, per family patient is acting at his baseline, they were concerned for increasing fatigue and possible hydration today    Physical exam patient does have generalized abdominal tenderness,    EKG is documented by my attending, please see his note for further details. CT of head shows no acute process    CBC shows no evidence of leukocytosis, mild anemia. CMP is unremarkable. Lipase is normal.  Lactic acid is normal.  Urine shows no evidence of infection or blood. CT shows no acute process    Shared medical decision making with the family as they have had intermittent episodes for 2 years and are concerned about taking him home with his general fatigue, they do not feel comfortable taking the patient home, and feel that the patient would need to be admitted, possibly for placement. And the hospitalist has been paged for admission, patient family updated and agreeable with plan. Stable for admission. FINAL IMPRESSION      1. Other fatigue    2. Abdominal pain, unspecified abdominal location          DISPOSITION/PLAN   DISPOSITION        PATIENT REFERREDTO:  No follow-up provider specified.     DISCHARGE MEDICATIONS:  New Prescriptions    No medications on file       DISCONTINUED MEDICATIONS:  Discontinued Medications    No medications on file              (Please note that portions of this note were completed with a voice recognition program.  Efforts were made to edit the dictations but occasionally words are mis-transcribed.)    Best Hammond PA-C (electronically signed)            Best Hammond PA-C  04/29/21 1952

## 2021-04-29 NOTE — H&P
[facility] Medicine History and Physical    4/29/2021    Date of Admission: 4/29/2021    Date of Service: Pt seen/examined on 4/29/2021 and admitted to observation. Assessment/plan:  1. Acute on chronic encephalopathy. So far, no clear etiology. Cannot exclude CVA, intracranial lesion. Obtain MRIbrain (to be done with and without contrast to rule out intracranial lesion, CVA). Continue aspirin, statin. CThead was unremarkable in the emergency room. Given report of blank stare, rule out seizure. Maintain on seizure precautions. Consult has been initiated to neurology to assist with evaluation. 2. Chronic abdominal pain. Patient does not have tenderness on exam.  Continue PPI. Consult GI to assist with evaluation. N.p.o. after midnight, in case endoscopy is considered. 3. Other comorbidities: History of CVA, traumatic brain injury. Activities: Up with assist  Prophylaxis: Subcutaneous Lovenox  Code status: Full code    ==========================================================  Chief complaint:  Chief Complaint   Patient presents with    Altered Mental Status     Pt. in per [facility] with report of him having episodes where he is more confused than normal, report of old Brain injury, unknown time as to when pt. has been different. Squad reports pt. vomited once. History of Presenting Illness: This is a pleasant 69 y.o. male with history of CVA, traumatic brain injury, who has longstanding history of intermittent confusion, chronic abdominal pain, headache, who presents to the emergency room with complaints of worsening confusion, blank staring episodes in which he was not responding to his son. He was also reported to have generalized weakness, has difficulty ambulating on his own. Son reports the symptoms have been chronic, intermittent, happens to be worsening.   Son is frustrated that no medical provider have been able to give them answer as to the etiology of patient's crackles/wheezes  Gastrointestinal: Soft, NT/ND, +BS  Musculoskeletal: No edema. Warm  Neuro: Not following commands. Moves extremity spontaneously. Psychiatry: Appropriate affect. Not agitated. Skin: Warm, dry with normal turgor. No rash  Capillary refill: Brisk,< 3 seconds   Peripheral Pulses: +2 palpable, equal bilaterally       Labs/imaging/EKG:  CBC:   Recent Labs     04/29/21  1502   WBC 7.3   HGB 13.2*        BMP:    Recent Labs     04/29/21  1502      K 3.6      CO2 29   BUN 16   CREATININE 1.0   GLUCOSE 116*     Hepatic:   Recent Labs     04/29/21  1502   AST 15   ALT 11   BILITOT 0.9   ALKPHOS 86       Ct Head Wo Contrast    Result Date: 4/29/2021  EXAMINATION: CT OF THE HEAD WITHOUT CONTRAST  4/29/2021 1:14 pm TECHNIQUE: CT of the head was performed without the administration of intravenous contrast. Dose modulation, iterative reconstruction, and/or weight based adjustment of the mA/kV was utilized to reduce the radiation dose to as low as reasonably achievable. COMPARISON: 03/27/2017 HISTORY: ORDERING SYSTEM PROVIDED HISTORY: headache TECHNOLOGIST PROVIDED HISTORY: Reason for exam:->headache Has a \"code stroke\" or \"stroke alert\" been called? ->No Decision Support Exception - unselect if not a suspected or confirmed emergency medical condition->Emergency Medical Condition (MA) Reason for Exam: AMS, headache Acuity: Unknown Type of Exam: Unknown FINDINGS: BRAIN/VENTRICLES: There is no acute intracranial hemorrhage, mass effect or midline shift. No abnormal extra-axial fluid collection. The gray-white differentiation is maintained without evidence of an acute infarct. There is no evidence of hydrocephalus. There are chronic atrophic and ischemic white matter changes similar to the prior exam. ORBITS: The visualized portion of the orbits demonstrate no acute abnormality. SINUSES: There is opacification of the maxillary sinuses which are incompletely visualized.  SOFT TISSUES/SKULL:  No acute abnormality of the visualized skull or soft tissues. No acute intracranial abnormality. Ct Abdomen Pelvis W Iv Contrast Additional Contrast? None    Result Date: 4/29/2021  EXAMINATION: CT OF THE ABDOMEN AND PELVIS WITH CONTRAST 4/29/2021 3:46 pm TECHNIQUE: CT of the abdomen and pelvis was performed with the administration of intravenous contrast. Multiplanar reformatted images are provided for review. Dose modulation, iterative reconstruction, and/or weight based adjustment of the mA/kV was utilized to reduce the radiation dose to as low as reasonably achievable. COMPARISON: None. HISTORY: ORDERING SYSTEM PROVIDED HISTORY: Eval for SBO TECHNOLOGIST PROVIDED HISTORY: Additional Contrast?->None Reason for exam:->Eval for SBO Decision Support Exception - unselect if not a suspected or confirmed emergency medical condition->Emergency Medical Condition (MA) Reason for Exam: Eval for SBO Acuity: Unknown Type of Exam: Unknown FINDINGS: Lower Chest:The lung bases are clear Organs: The liver, spleen, adrenal glands, kidneys, and pancreas demonstrate no acute abnormality. GI/Bowel: The visualized portions of the bowel are unremarkable. Peritoneum/Retroperitoneum: Unremarkable Pelvis: Unremarkable Bones: No suspicious osseous lesions are identified     No acute intra-abdominal abnormality     Xr Chest Portable    Result Date: 4/29/2021  EXAMINATION: ONE XRAY VIEW OF THE CHEST 4/29/2021 1:49 pm COMPARISON: Prior study(s) most recent 12/12/2016. HISTORY: ORDERING SYSTEM PROVIDED HISTORY: SOB TECHNOLOGIST PROVIDED HISTORY: Reason for exam:->SOB FINDINGS: The heart is borderline to mildly enlarged. Pulmonary vessels are congested with some superior redistribution. No acute or focal airspace disease. Bones are unremarkable. Old left rib fractures unchanged. No pleural effusion or pneumothorax. Cardiomegaly with borderline to mild vascular congestion. EKG: Sinus rhythm, 82 beats per minutes.   No acute ST/T changes. I reviewed EKG. Discussed with ER provider.       Thank you Referring Not In System (Inactive) for the opportunity to be involved in this patient's care.    -----------------------------  Isaiah Conti MD  [facility]

## 2021-04-29 NOTE — ED NOTES
Pharmacy Medication History Note      List of current medications patient is taking is complete. Source of information: Spouse/[facility]    Changes made to medication list:  Medications flagged for removal (include reason, ex. noncompliance):  none    Medications removed (include reason, ex. therapy complete or physician discontinued): All medications- see list below    Medications added/doses adjusted:  Amlodipine 2.5 mg daily  Atorvastatin 80 mg daily  Omeprazole 20 mg daily  Aspirin 81 mg daily    Other notes (ex. Recent course of antibiotics, Coumadin dosing):  Denies use of other OTC or herbal medications. Last dose times updated. Darrell Montanez, PharmD  ED Pharmacist [id number]  4/29/2021    No current facility-administered medications on file prior to encounter.       Current Outpatient Medications on File Prior to Encounter   Medication Sig Dispense Refill    amLODIPine (NORVASC) 10 MG tablet Take 2.5 mg by mouth daily       atorvastatin (LIPITOR) 80 MG tablet Take 80 mg by mouth daily      aspirin 81 MG chewable tablet Take 81 mg by mouth daily       omeprazole (PRILOSEC) 20 MG delayed release capsule Take 20 mg by mouth daily      [DISCONTINUED] ondansetron (ZOFRAN-ODT) 4 MG disintegrating tablet Take 1 tablet by mouth every 8 hours as needed for Nausea or Vomiting 14 tablet 0    [DISCONTINUED] dicyclomine (BENTYL) 10 MG capsule Take 2 capsules by mouth every 6 hours as needed (cramps) 20 capsule 0    [DISCONTINUED] ondansetron (ZOFRAN) 4 MG tablet Take 1 tablet by mouth every 8 hours as needed for Nausea 10 tablet 0    [DISCONTINUED] fluticasone (FLONASE) 50 MCG/ACT nasal spray 1 spray by Nasal route daily 1 Bottle 0    [DISCONTINUED] acetaminophen (APAP EXTRA STRENGTH) 500 MG tablet Take 1 tablet by mouth every 6 hours as needed for Pain 30 tablet 0    [DISCONTINUED] pantoprazole (PROTONIX) 40 MG tablet Take 1 tablet by mouth 2 times daily 60 tablet 1    [DISCONTINUED] atorvastatin (LIPITOR)

## 2021-04-29 NOTE — PROGRESS NOTES
Pt seen in  ED, admission completed. Pt is alert to self and family, hx of TBI. Pt lives at home with family, and is being admitted for Acute on chronic encephalopathy and chronic abdominal pain. Plan of care updated, all questions answered.

## 2021-04-29 NOTE — ED NOTES
Pt. Family at bedside, pt. Is uncooperative and somewhat combative when trying to obtain EKG, son is able to calm him some but he continually pulls away and covers up with blankets saying he is cold then will begin dry heave and sya he's going to throw up but only spits into bag. RN will give him some time to calm down per son. Will attempt IV and medication when back to unit.      Danni Huntley RN  04/29/21 [id number]

## 2021-04-29 NOTE — ED PROVIDER NOTES
I independently performed a history and physical on Rosana Rangel. All diagnostic, treatment, and disposition decisions were made by myself in conjunction with the advanced practice provider. Briefly, this is a 69 y.o. male here for a chief complaint of nausea vomiting abdominal since the past day. Is a 70-year-old male who 10 years ago had a TBI will be in [place] [id number] and subsequently subsequent symptoms requiring self. He also had a cerebral infarction thousand 16. Usually he is ambulatory because commands increase of pain this morning he episodes roofline from the space would not follow commands would not respond to anything had nausea vomiting and incontinence. At present he is awake alert oriented and fairly cohesive. .    On exam, he is well-appearing male in no acute distress. Some residual late effects of stroke and prior TBI are evident. Abdomen is tender to the epigastrium. Bowel sounds are regular. Heart rate rhythm. Lungs clear. Screenings     [place] Coma Scale  Eye Opening: To speech  Best Verbal Response: Oriented  Best Motor Response: Obeys commands  [place] Coma Scale Score: 14   EKG was reviewed by myself. Dated today at [phone]. Rate 82. Sinus rhythm. LVH. No change from 2016       [facility]  CT abdomen to eval for acute process. Patient Referrals:  No follow-up provider specified. Discharge Medications:  Current Discharge Medication List          FINAL IMPRESSION  1. Other fatigue    2. Abdominal pain, unspecified abdominal location        Blood pressure (!) 146/80, pulse 89, temperature 98.8 °F (37.1 °C), temperature source Axillary, resp. rate 18, height 5' 8\" (1.727 m), weight 148 lb (67.1 kg), SpO2 95 %. For further details of [facility] emergency department encounter, please see documentation by advanced practice provider, Concepcion Stokc.        Randy Haas MD  04/30/21 [id number]

## 2021-04-30 ENCOUNTER — APPOINTMENT (OUTPATIENT)
Dept: MRI IMAGING | Age: 71
End: 2021-04-30
Payer: COMMERCIAL

## 2021-04-30 VITALS
HEART RATE: 76 BPM | WEIGHT: 148 LBS | OXYGEN SATURATION: 96 % | BODY MASS INDEX: 22.43 KG/M2 | SYSTOLIC BLOOD PRESSURE: 129 MMHG | DIASTOLIC BLOOD PRESSURE: 71 MMHG | HEIGHT: 68 IN | TEMPERATURE: 98.8 F | RESPIRATION RATE: 18 BRPM

## 2021-04-30 LAB
ANION GAP SERPL CALCULATED.3IONS-SCNC: 10 MMOL/L (ref 3–16)
BUN BLDV-MCNC: 12 MG/DL (ref 7–20)
CALCIUM SERPL-MCNC: 8.8 MG/DL (ref 8.3–10.6)
CHLORIDE BLD-SCNC: 105 MMOL/L (ref 99–110)
CHOLESTEROL, TOTAL: 166 MG/DL (ref 0–199)
CO2: 26 MMOL/L (ref 21–32)
CREAT SERPL-MCNC: 1 MG/DL (ref 0.8–1.3)
GFR AFRICAN AMERICAN: >60
GFR NON-AFRICAN AMERICAN: >60
GLUCOSE BLD-MCNC: 95 MG/DL (ref 70–99)
HCT VFR BLD CALC: 36.4 % (ref 40.5–52.5)
HDLC SERPL-MCNC: 55 MG/DL (ref 40–60)
HEMOGLOBIN: 12.2 G/DL (ref 13.5–17.5)
LDL CHOLESTEROL CALCULATED: 98 MG/DL
MCH RBC QN AUTO: 30.4 PG (ref 26–34)
MCHC RBC AUTO-ENTMCNC: 33.4 G/DL (ref 31–36)
MCV RBC AUTO: 91.2 FL (ref 80–100)
PDW BLD-RTO: 14.1 % (ref 12.4–15.4)
PLATELET # BLD: 155 K/UL (ref 135–450)
PMV BLD AUTO: 8.7 FL (ref 5–10.5)
POTASSIUM SERPL-SCNC: 3.9 MMOL/L (ref 3.5–5.1)
RBC # BLD: 3.99 M/UL (ref 4.2–5.9)
SODIUM BLD-SCNC: 141 MMOL/L (ref 136–145)
TRIGL SERPL-MCNC: 65 MG/DL (ref 0–150)
VLDLC SERPL CALC-MCNC: 13 MG/DL
WBC # BLD: 7.6 K/UL (ref 4–11)

## 2021-04-30 PROCEDURE — 80048 BASIC METABOLIC PNL TOTAL CA: CPT

## 2021-04-30 PROCEDURE — 97162 PT EVAL MOD COMPLEX 30 MIN: CPT

## 2021-04-30 PROCEDURE — 70553 MRI BRAIN STEM W/O & W/DYE: CPT

## 2021-04-30 PROCEDURE — 97530 THERAPEUTIC ACTIVITIES: CPT

## 2021-04-30 PROCEDURE — 85027 COMPLETE CBC AUTOMATED: CPT

## 2021-04-30 PROCEDURE — 36415 COLL VENOUS BLD VENIPUNCTURE: CPT

## 2021-04-30 PROCEDURE — 92526 ORAL FUNCTION THERAPY: CPT

## 2021-04-30 PROCEDURE — 6360000004 HC RX CONTRAST MEDICATION: Performed by: INTERNAL MEDICINE

## 2021-04-30 PROCEDURE — G0378 HOSPITAL OBSERVATION PER HR: HCPCS

## 2021-04-30 PROCEDURE — 80061 LIPID PANEL: CPT

## 2021-04-30 PROCEDURE — 6370000000 HC RX 637 (ALT 250 FOR IP): Performed by: INTERNAL MEDICINE

## 2021-04-30 PROCEDURE — 2580000003 HC RX 258: Performed by: INTERNAL MEDICINE

## 2021-04-30 PROCEDURE — 99219 PR INITIAL OBSERVATION CARE/DAY 50 MINUTES: CPT | Performed by: PSYCHIATRY & NEUROLOGY

## 2021-04-30 PROCEDURE — 92610 EVALUATE SWALLOWING FUNCTION: CPT

## 2021-04-30 PROCEDURE — A9577 INJ MULTIHANCE: HCPCS | Performed by: INTERNAL MEDICINE

## 2021-04-30 PROCEDURE — 97165 OT EVAL LOW COMPLEX 30 MIN: CPT

## 2021-04-30 RX ORDER — PANTOPRAZOLE SODIUM 40 MG/1
40 TABLET, DELAYED RELEASE ORAL
Qty: 30 TABLET | Refills: 3 | Status: SHIPPED | OUTPATIENT
Start: 2021-05-01

## 2021-04-30 RX ADMIN — GADOBENATE DIMEGLUMINE 13 ML: 529 INJECTION, SOLUTION INTRAVENOUS at 11:34

## 2021-04-30 RX ADMIN — Medication 10 ML: at 09:21

## 2021-04-30 RX ADMIN — SUCRALFATE 1 G: 1 TABLET ORAL at 14:05

## 2021-04-30 RX ADMIN — AMLODIPINE BESYLATE 2.5 MG: 5 TABLET ORAL at 14:05

## 2021-04-30 RX ADMIN — ASPIRIN 81 MG: 81 TABLET, COATED ORAL at 14:05

## 2021-04-30 NOTE — PROGRESS NOTES
Occupational Therapy   Occupational Therapy Initial Assessment  Date: 2021   Patient Name: Gris Simmons  MRN: [id number]     : 1950    Date of Service: 2021    Discharge Recommendations: Gris Simmons scored a 18/24 on the AM-PAC ADL Inpatient form. Current research shows that an AM-PAC score of 18 or greater is typically associated with a discharge to the patient's home setting. Based on the patient's AM-PAC score, and their current ADL deficits, it is recommended that the patient have 2-3 sessions per week of Occupational Therapy at d/c to increase the patient's independence. At this time, this patient demonstrates the endurance and safety to discharge home with home services with 24 hour assist (home vs OP services) and a follow up treatment frequency of 2-3x/wk. Please see assessment section for further patient specific details. If patient discharges prior to next session this note will serve as a discharge summary. Please see below for the latest assessment towards goals. HOME HEALTH CARE: LEVEL 3 SAFETY     - Initial home health evaluation to occur within 24-48 hours, in patient home   - Therapy evaluations in home within 24-48 hours of discharge; including DME and home safety   - Frontload therapy 5 days, then 3x a week   - Therapy to evaluate if patient has [address] needs for personal care   -  evaluation within 24-48 hours, includes evaluation of resources and insurance to determine [place], [place], LTC, and Medicaid options        OT Equipment Recommendations  Equipment Needed: No    Assessment   Performance deficits / Impairments: Decreased functional mobility ; Decreased balance;Decreased ADL status; Decreased cognition;Decreased high-level IADLs;Decreased endurance  Assessment: Patient presents below baseline d/t above deficits; OT indicated to maximize safety/independence with ADL and IADL  Treatment Diagnosis: Above deficits associated with chronic abdominal pain  Prognosis: Good  Decision Making: Medium Complexity  Exam: as above  OT Education: OT Role;Plan of Care  Patient Education: eval, discharge - patient not an independent learner, requires reinforcement for carryover  REQUIRES OT FOLLOW UP: Yes  Activity Tolerance  Activity Tolerance: Patient Tolerated treatment well  Safety Devices  Safety Devices in place: Yes  Type of devices: All fall risk precautions in place;Nurse notified; Left in chair;Chair alarm in place;Call light within reach;Gait belt           Patient Diagnosis(es): The primary encounter diagnosis was Other fatigue. A diagnosis of Abdominal pain, unspecified abdominal location was also pertinent to this visit. has a past medical history of Allergic rhinitis, Cerebral artery occlusion with cerebral infarction [facility]), Hypertension, and Injury brain, traumatic ([facility] [address] has a past surgical history that includes Colonoscopy and Upper gastrointestinal endoscopy (08/15/2016). Treatment Diagnosis: Above deficits associated with chronic abdominal pain      Restrictions  Restrictions/Precautions  Restrictions/Precautions: Seizure, Fall Risk(high fall risk, seizure precautions)  Required Braces or Orthoses?: No  Position Activity Restriction  Other position/activity restrictions: 69 y.o. male with history of CVA, traumatic brain injury, who has longstanding history of intermittent confusion, chronic abdominal pain, headache, who presents to the emergency room with complaints of worsening confusion, blank staring episodes in which he was not responding to his son. He was also reported to have generalized weakness, has difficulty ambulating on his own. Son reports the symptoms have been chronic, intermittent, happens to be worsening. Son is frustrated that no medical provider have been able to give them answer as to the etiology of patient's symptoms, despite ongoing for about 2 years now. He had colonoscopy last year.   No recent EGD, according to family members. In the emergency room, patient had unremarkable urinalysis, CThead, CTabdomen/pelvis. Subjective   General  Chart Reviewed: Yes(high fall risk)  Patient assessed for rehabilitation services?: Yes  Diagnosis: Chronic abdominal pain  Subjective  Subjective: Patient supine in bed upon arrival, pleasantly confused - cooperative with evaluation, son present. Pt denies pain, reports he is itchy - RN aware  Patient Currently in Pain: Denies  Pre Treatment Pain Screening  Intervention List: Patient able to continue with treatment;Nurse/Physician notified  Vital Signs  Level of Consciousness: Responds to Voice (1)  Patient Currently in Pain: Denies     Social/Functional History  Social/Functional History  Lives With: Spouse(lives with wife but she is not in good health. Son plans to live with pt at home until he is back to baseline)  Type of Home: House  Home Layout: One level  Home Access: Level entry  Bathroom Shower/Tub: Tub/Shower unit  Bathroom Toilet: Standard  Home Equipment: Rolling walker, Wheelchair-manual, Wheelchair-electric  ADL Assistance: Needs assistance(occasionally recieves assistance from family)  Homemaking Assistance: Needs assistance  Ambulation Assistance: Needs assistance(occasionally needs assistance)  Transfer Assistance: Needs assistance  Active : No  Leisure & Hobbies: likes to work in the yard and put stuff together  Additional Comments: 3 years ago fall and declining in health ever since.  one fall yesterday and another fall within last 6 months, son reports pt balance is off more than normal. Son provided social history due to pt unable to provide due to cognition       Objective   Vision: Within Functional Limits  Hearing: Exceptions to [facility]  Hearing Exceptions: Bilateral hearing aid    Orientation  Overall Orientation Status: Impaired  Observation/Palpation  Posture: Fair(forward head)  Balance  Sitting Balance: Stand by assistance  Standing Balance: Contact guard assistance  Functional Mobility  Functional - Mobility Device: Rolling Walker  Activity: Other  Assist Level: Contact guard assistance  Functional Mobility Comments: CGA/min A for 50' ambulation in hallway  ADL  LE Dressing: Setup(donning socks)  Tone RUE  RUE Tone: Normotonic  Tone LUE  LUE Tone: Normotonic  Coordination  Movements Are Fluid And Coordinated: Yes     Bed mobility  Supine to Sit: Stand by assistance  Sit to Supine: Stand by assistance  Scooting: Stand by assistance  Transfers  Sit to stand: Contact guard assistance  Stand to sit: Contact guard assistance     Cognition  Overall Cognitive Status: Exceptions  Arousal/Alertness: Inconsistent responses to stimuli  Following Commands: Inconsistently follows commands  Attention Span: Difficulty attending to directions; Unable to maintain attention; Difficulty dividing attention  Memory: Decreased recall of biographical Information;Decreased recall of precautions;Decreased recall of recent events;Decreased short term memory;Decreased long term memory  Safety Judgement: Decreased awareness of need for safety;Decreased awareness of need for assistance  Problem Solving: Assistance required to correct errors made;Assistance required to identify errors made  Insights: Not aware of deficits  Initiation: Requires cues for all  Sequencing: Requires cues for all  Cognition Comment: Pt has history of TBI        Sensation  Overall Sensation Status: (unable to assess due to pt cognition)        LUE AROM (degrees)  LUE AROM : WFL  RUE AROM (degrees)  RUE AROM : WFL                      Plan   Plan  Times per week: 3-5  Times per day: Daily  Current Treatment Recommendations: Strengthening, Endurance Training, Balance Training, Functional Mobility Training, Safety Education & Training, Home Management Training, Equipment Evaluation, Education, & procurement, Self-Care / ADL, Patient/Caregiver Education & Training    G-Code     OutComes Score AM-PAC Score        AM-PAC Inpatient Daily Activity Raw Score: 18 (04/30/21 1301)  AM-PAC Inpatient ADL T-Scale Score : 38.66 (04/30/21 1301)  ADL Inpatient CMS 0-100% Score: 46.65 (04/30/21 1301)  ADL Inpatient CMS G-Code Modifier : CK (04/30/21 1301)    Goals  Short term goals  Time Frame for Short term goals: discharge  Short term goal 1: UB ADL supervision  Short term goal 2: LB ADL supervision  Short term goal 3: Toileting supervision  Short term goal 4: Fxl transfers supervision  Short term goal 5: Fxl mob supervision  Long term goals  Time Frame for Long term goals : LTG=STG       Therapy Time   Individual Concurrent Group Co-treatment   Time In 1142         Time Out 1220         Minutes 38            Timed Code Treatment Minutes:    0 minutes    Total Treatment Minutes:  38 minutes    Billing split with PT secondary to patient's observation status    JACKELINE Montana OTR/L [id number]    Vinay Rogers OT

## 2021-04-30 NOTE — CONSULTS
Gastroenterology Consult Note        Patient: Sherley Lozano  : 1950  Acct#:      Date:  2021    Subjective:       History of Present Illness  Patient is a 69 y.o.  male admitted with Chronic abdominal pain [R10.9, G89.29] who is seen in consult for abdominal pain, N/V. He has h/o CVA and TBI. H/o duodenal and jejunal AVMs as below. His son is at bedside and provides history. He was brought to the ED for abdominal pain, N/V/D, headaches, altered mental status. CT and MRI brain nonacute. Mental status now back to baseline. Pain and N/V now resolved. His son reports pt c/o abdominal pain on a daily basis. Pain is periumbilical. He does fine eating. He is on prilosec 20 mg daily. Typically has solid stools daily. About once every 2-3 months, he has worsening pain along with nausea, vomiting, and diarrhea. No melena or hematochezia. He did drink some coffee with cream an hour ago. Takes tylenol for pain regularly. Sometimes takes ibuprofen. Past Medical History:   Diagnosis Date    Allergic rhinitis     Cerebral artery occlusion with cerebral infarction [facility]) 2016    Hypertension     Injury brain, traumatic [facility])       Past Surgical History:   Procedure Laterality Date    COLONOSCOPY      UPPER GASTROINTESTINAL ENDOSCOPY  08/15/2016      Past Endoscopic History   2016 EGD with Dr Natalia Espinal  INTRODUCTION:  This is a 25-year-old male who recently had cautery of  arteriovenous malformations of the duodenum performed at [facility] and  presents now with nausea, vomiting and then hematemesis. He is on aspirin  and Plavix. Emergent EGD is undertaken. The risks, benefits and  alternatives including the risks of bleeding, infection, sedation and  perforation were discussed. Informed consent was obtained from the patient. IMPRESSION:   1. Healing ulcers at site of arteriovenous malformation cautery of the  duodenum at prior surgery.    2.  Perhaps minimal red strength BUE      Data Review:    Recent Labs     21  1502 21  0430   WBC 7.3 7.6   HGB 13.2* 12.2*   HCT 39.9* 36.4*   MCV 90.5 91.2    155     Recent Labs     21  1502 21  0430    141   K 3.6 3.9    105   CO2 29 26   BUN 16 12   CREATININE 1.0 1.0     Recent Labs     21  1502   AST 15   ALT 11   BILITOT 0.9   ALKPHOS 86     Recent Labs     21  1502   LIPASE 22.0     Recent Labs     21  1502   PROTIME 13.0   INR 1.12     No results for input(s): PTT in the last 72 hours. No results for input(s): OCCULTBLD in the last 72 hours. Imaging Studies:                 CT-scan of abdomen and pelvis w iv contast   Impression:     No acute intra-abdominal abnormality                       Assessment:     Active Problems:    Chronic abdominal pain  Resolved Problems:    * No resolved hospital problems. *    Chronic periumbilical pain, N/V/D - periumbilical pain on a daily basis with N/V/D intermittently. On low dose PPI daily. CT negative. Liver enzymes and lipase negative. Recommendations:   - increase PPI to 40 mg daily  - avoid nsaids  - plan outpatient EGD    Discussed with Dr. Danica Benavides, PA-C  [facility]    I have personally performed a face to face diagnostic evaluation on this patient. I have interviewed and examined the patient and I agree with the findings and recommended plan of care. In summary, my findings and plan are the followin-year-old -American male with a history of CVA and traumatic brain injury. He was brought to the ER with headaches, abdominal pain, nausea, vomiting, diarrhea and altered mental status. CT and MRI was negative. His mental status is now back at baseline, per son ([place]). GI was consulted for chronic intermittent periumbilical pain with nausea and vomiting. His bowel habits are irregular, with a tendency towards diarrhea. Patient does not have heavy NSAID use.   He had a colonoscopy in 2019.  Vital signs are stable. Abdomen is soft nontender no rebound or guarding  Impression and plan: Intermittent abdominal pain with nausea, vomiting and diarrhea. He might have functional bowel issues such as IBS, H. pylori gastritis. Increase PPI to twice daily, will plan for outpatient EGD (we will call patient to schedule). Because of his comorbidities, we will do his EGD at [facility]. Patient can be discharged from GI standpoint.     Maribel Munoz MD, MSc  [facility]  04/30/21

## 2021-04-30 NOTE — PROGRESS NOTES
following commands due to cognitive status and impulsivity. Pt would continue to benefit from skilled therapy in order to improve fucntional mobility and safely return to OF. Treatment Diagnosis: decreased functional mobility, decreased cognition  Prognosis: Good  Decision Making: Medium Complexity  History: CVA, HTN, TBI  Clinical Presentation: evolving  PT Education: PT Role;Plan of Care;General Safety;Gait Training;Functional Mobility Training  Patient Education: discharge recommendations discussed with son due to pt cognitive status. Encouraged to use RW for ambulation at this time. Pt will require reinforcement for carryover  Barriers to Learning: cognition, hearing  REQUIRES PT FOLLOW UP: Yes  Activity Tolerance  Activity Tolerance: Patient Tolerated treatment well;Patient limited by pain; Patient limited by cognitive status       Patient Diagnosis(es): The primary encounter diagnosis was Other fatigue. A diagnosis of Abdominal pain, unspecified abdominal location was also pertinent to this visit. has a past medical history of Allergic rhinitis, Cerebral artery occlusion with cerebral infarction [facility]), Hypertension, and Injury brain, traumatic ([facility] [address] has a past surgical history that includes Colonoscopy and Upper gastrointestinal endoscopy (08/15/2016).     Restrictions  Restrictions/Precautions  Restrictions/Precautions: Seizure, Fall Risk(high fall risk, seizure precautions)  Required Braces or Orthoses?: No  Vision/Hearing  Vision: Within Functional Limits  Hearing: Exceptions to [facility]  Hearing Exceptions: Bilateral hearing aid     Subjective  General  Chart Reviewed: Yes  Patient assessed for rehabilitation services?: Yes  Additional Pertinent Hx: CVA, HTN, TBI  Response To Previous Treatment: Not applicable  Family / Caregiver Present: Yes(son)  Diagnosis: chronic abdominal pain  Follows Commands: Impaired  General Comment  Comments: Pt supine in bed, agreeable to therapy  Subjective  Subjective: Pt reports no pain but is feeling itchy. Nurse notified  Pain Screening  Patient Currently in Pain: Denies          Orientation  Orientation  Overall Orientation Status: Impaired  Social/Functional History  Social/Functional History  Lives With: Spouse(lives with wife but she is not in good health. Son plans to live with pt at home until he is back to baseline)  Type of Home: House  Home Layout: One level  Home Access: Level entry  Bathroom Shower/Tub: Tub/Shower unit  Bathroom Toilet: Standard  Home Equipment: Rolling walker, Wheelchair-manual, Wheelchair-electric  ADL Assistance: Needs assistance(occasionally recieves assistance from family)  Homemaking Assistance: Needs assistance  Ambulation Assistance: Needs assistance(occasionally needs assistance)  Transfer Assistance: Needs assistance  Active : No  Leisure & Hobbies: likes to work in the yard and put stuff together  Additional Comments: 3 years ago fall and declining in health ever since. one fall yesterday and another fall within last 6 months, son reports pt balance is off more than normal. Son provided social history due to pt unable to provide due to cognition  Cognition   Cognition  Overall Cognitive Status: Exceptions  Arousal/Alertness: Inconsistent responses to stimuli  Following Commands: Inconsistently follows commands  Attention Span: Difficulty attending to directions; Unable to maintain attention; Difficulty dividing attention  Memory: Decreased recall of biographical Information;Decreased recall of precautions;Decreased recall of recent events;Decreased short term memory;Decreased long term memory  Safety Judgement: Decreased awareness of need for safety;Decreased awareness of need for assistance  Problem Solving: Assistance required to correct errors made;Assistance required to identify errors made  Insights: Not aware of deficits  Initiation: Requires cues for all  Sequencing: Requires cues for all  Cognition Comment: Pt has history of TBI Objective     Observation/Palpation  Posture: Fair(forward head)    AROM RLE (degrees)  RLE AROM: WFL  AROM LLE (degrees)  LLE AROM : WFL  Strength RLE  Strength RLE: WFL  Comment: Able to complete sit<>stand with CGA  Strength LLE  Strength LLE: WFL  Comment: Able to complete sit<>stand with CGA  Tone RLE  RLE Tone: Normotonic  Tone LLE  LLE Tone: Normotonic  Sensation  Overall Sensation Status: (unable to assess due to pt cognition)  Bed mobility  Supine to Sit: Stand by assistance  Sit to Supine: Stand by assistance  Scooting: Stand by assistance  Transfers  Sit to Stand: Contact guard assistance  Stand to sit: Contact guard assistance  Ambulation  Ambulation?: Yes  Ambulation 1  Surface: level tile  Device: Rolling Walker  Assistance: Contact guard assistance;Minimal assistance  Quality of Gait: Pt ambulates with R and L lateral sway, flexed posture with RW out in front rather than within ONEIL. Pt reported pain in feet with ambulation  Gait Deviations: Deviated path; Increased ONEIL  Distance: 50 ft  Comments: Pt was able to ambulate 50ft with RW with CGA-Min A to ensure safety and provide VC and TC on RW naviation and remind pt to stay within RW ONEIL. Stairs/Curb  Stairs?: No     Balance  Posture: Fair(forward head)  Sitting - Static: Good  Sitting - Dynamic: Good  Standing - Static: Good;-(with RW)  Standing - Dynamic: Fair;+(with RW)        Plan   Plan  Times per week: 3-5x  Times per day: Daily  Current Treatment Recommendations: Strengthening, Balance Training, Functional Mobility Training, Gait Training, Endurance Training, Pain Management, Modalities, Safety Education & Training, Patient/Caregiver Education & Training, Equipment Evaluation, Education, & procurement  Safety Devices  Type of devices:  All fall risk precautions in place, Bed alarm in place, Call light within reach, Gait belt, Patient at risk for falls, Left in bed, Telesitter in use, Nurse notified(bed pads in place for seizure precautions) AM-PAC Score  AM-PAC Inpatient Mobility Raw Score : 18 (04/30/21 1244)  AM-PAC Inpatient T-Scale Score : 43.63 (04/30/21 1244)  Mobility Inpatient CMS 0-100% Score: 46.58 (04/30/21 1244)  Mobility Inpatient CMS G-Code Modifier : CK (04/30/21 1244)          Goals  Short term goals  Time Frame for Short term goals: upon discharge  Short term goal 1: Pt will be able to complete bed mobility with supervision  Short term goal 2: Pt will be able to complete sit<>stand transfers with supervision  Short term goal 3: Pt will be able to ambulate 100 ft with LRAD and SBA       Therapy Time   Individual Concurrent Group Co-treatment   Time In       1142   Time Out       1221   Minutes       39        Timed Code Treatment Minutes:   9 (observation, mins split with OT)     Total Treatment Minutes:  39  Cassidy Thornton, SPT   PT observed and directed the above evaluation/treatment.   [address], DPT [id number]

## 2021-04-30 NOTE — PROGRESS NOTES
CLINICAL BEDSIDE SWALLOWING EVALUATION  Speech Therapy Department    Patient Name:  Chad Duverney  :  1950  Pain level: Pt did not report pain  Medical Diagnosis:   Chronic abdominal pain [R10.9, G89.29]    HPI: Per chart: This is a pleasant 69 y.o. male with history of CVA, traumatic brain injury, who has longstanding history of intermittent confusion, chronic abdominal pain, headache, who presents to the emergency room with complaints of worsening confusion, blank staring episodes in which he was not responding to his son. He was also reported to have generalized weakness, has difficulty ambulating on his own. Son reports the symptoms have been chronic, intermittent, happens to be worsening. Son is frustrated that no medical provider have been able to give them answer as to the etiology of patient's symptoms, despite ongoing for about 2 years now. He had colonoscopy last year. No recent EGD, according to family members. In the emergency room, patient had unremarkable urinalysis, CThead, CTabdomen/pelvis. Chest x-ray reveals mild pulmonary vascular congestion; however, patient does not appear volume overloaded. Patient is confused and unable to provide reliable HPI. MRI of Brain:  1. No acute intracranial abnormality.  No acute infarct. 2. Areas of encephalomalacia with gliosis involving the left frontal, left   parietal and left temporal lobes, which is unchanged the prior exam.  This   may represent areas of prior ischemic or traumatic injury. 3. Mild global parenchymal volume loss with mild-to-moderate chronic   microvascular ischemic changes. 4. Complete opacification of the maxillary sinuses bilaterally. 5. Trace mastoid effusions. Chest x-ray:  Cardiomegaly with borderline to mild vascular congestion. Treatment Diagnosis: Mild Oropharyngeal Dysphagia    Impressions: Pt was seen sitting upright in bed, his son was present for evaluation and provided history.  Pt is very hard of hearing and has a history of TBI. Pt's son reported no difficulty with swallowing however Pt's nose runs constantly and he \"spits up thick saliva. \" Pt consumes a regular texture diet with thin liquids at home. Various textures were provided to assess swallow function. Pt presents with mild oropharyngeal dysphagia characterized by prolonged mastication, suspected premature bolus loss to pharynx, and mildly delayed swallow initiation. Laryngeal elevation was felt upon manual palpation. Pt was noted to take successive sips of thin liquids with 1-2 instances of belching post swallow. Prolonged but functional mastication was noted with regular solids with effective oral clearing achieved. Recommend continuation of current diet with ongoing use of aspiration precautions. If further difficulty is noted, Pt may benefit from a MBS to further assess the pharyngeal phase of swallowing. Dietary Recommendations: Regular texture diet with thin liquids, meds in puree     Strategies: 90 degree positioning with all p.o. intake; small bites/sips; alternate textures through meal; reduce rate of intake    Treatment/Goals: Speech therapy for dysphagia tx 3-5 times per week. ST.) Pt will tolerate recommended diet without s/s of aspiration   2.) If clinical symptoms of penetration/aspiration continue to be noted,Pt will tolerate MBS to r/o aspiration and determine appropriate diet/liquid level.      Oral motor Exam:  Dentition: missing some teeth   Labial/Facial: within functional limits   Lingual: within functional limits   Voice: within functional limits     Oral Phase:   Prolonged mastication  Reduced bolus cohesion   Suspected premature bolus loss to pharynx    Pharyngeal Phase:  Suspected pharyngeal pooling  Delayed swallow initiation    Patient/Family Education:Education, results and recommendations given to the Pt, his son, and nurse, who verbalized understanding    Timed Code Treatment: 0 minutes     Total Treatment Time: 24 minutes     Discharge Recommendations: Speech Therapy for Speech/Dysphagia treatment at discharge pending diet tolerance. If patient discharges prior to next session this note will serve as a discharge summary.       Magan Pappas M.A., [address] [place] [address]  Speech-Language Pathologist

## 2021-04-30 NOTE — CONSULTS
In patient Neurology consult        [facility]      [facility]  1950    Date of Service: 4/30/2021    Referring Physician: Gabbie Contreras MD    Most of the history was obtained from detailed chart reviewing and discussion with the patient's son. The patient is currently confused and unable to provide me with accurate history. Reason for the consult and CC: Acute encephalopathy    HPI:   The patient is a 69y.o.  years old male with history of prior CVA, TBI and chronic cognitive impairment who was admitted to the hospital yesterday with acute encephalopathy. Symptoms started few hours prior to admission. According to his son, he has history of episodic confusion over the last year or so. Frequency is once every several months. Usually triggered and preceded by GI illness with diarrhea, abdominal pain, nausea and vomiting per the patient and will have. Stating episodes at home where he will not responding, more confused and unable to perform his ADL. Degree can be severe and duration is days. No witnessed seizure-like activity, falling or injury or tongue biting or bladder incontinence. No other relieving or aggravating factors. Patient sent to come to the ER for persistent encephalopathy. Initial work-up with CT head showed no acute findings. Blood test was unremarkable except for mild chronic anemia. He was admitted. Patient had MRI of the brain today which showed no acute stroke and left hemispheric encephalomalacia with diffuse atrophy. His son feels he is better compared to yesterday and is almost back to normal.  Lives with his wife. Both has baseline dementia need assist in ADL. Other review of system was unremarkable. History reviewed. No pertinent family history.       Past Medical History:   Diagnosis Date    Allergic rhinitis     Cerebral artery occlusion with cerebral infarction [facility]) 2016    Hypertension     Injury brain, traumatic (NORMODYNE;TRANDATE) injection 10 mg  10 mg Intravenous Q4H PRN Leigha Garvin MD           ROS: 10-14 system review was limited due to MS changes or as per HPI. Constitutional:   Vitals:    04/29/21 2028 04/30/21 0140 04/30/21 0526 04/30/21 1403   BP: (!) 160/91 110/60 (!) 146/80 (!) 176/89   Pulse: 100 92 89 80   Resp: 18 16 18 18   Temp: 98.5 °F (36.9 °C) 98 °F (36.7 °C) 98.8 °F (37.1 °C)    TempSrc: Axillary Axillary Axillary    SpO2: 100% 94% 95% 96%   Weight:       Height:           General appearance: NAD  Eye: Fundus of the eye: Optic disc is difficult to obtain due to poor cooperation from the patient  Neck: supple  Cardiovascular: No lower leg edema with good pulsation. No carotid bruit. Mental Status:   AAO times one, himself. Poor attention and concentration. Waxing and waning. Poor recent and remote memory  Language: Fluent but with poor comprehension and not following directions  Poor fund of knowledge. Cranial Nerves:   II: Visual fields: full. Pupils: equal, round, reactive to light  III,IV,VI: Extra Ocular Movements are intact. No nystagmus  V: Facial sensation : normal  VII: Facial strength and movements: intact and symmetric  VIII: Hearing: Hard of hearing  IX: Palate elevation symmetric   XI: Shoulder shrug: symmetric  XII: Tongue movements: midline  Musculoskeletal:  The patient can move all 4 extremities. No apparent focal weakness. Tone: Normal tone. No rigidity. Reflexes: Symmetric 1+ throughout. Planters: flexor bilaterally. Coordination: No abnormal movement  Sensation: No sensory loss   gait/Posture: Cannot be tested due to poor cooperation from the patient.     Data:  LABS:   Lab Results   Component Value Date     04/30/2021    K 3.9 04/30/2021    K 4.1 10/09/2018     04/30/2021    CO2 26 04/30/2021    BUN 12 04/30/2021    CREATININE 1.0 04/30/2021    GFRAA >60 04/30/2021    LABGLOM >60 04/30/2021    GLUCOSE 95 04/30/2021    CALCIUM 8.8 04/30/2021 Lab Results   Component Value Date    WBC 7.6 04/30/2021    RBC 3.99 04/30/2021    HGB 12.2 04/30/2021    HCT 36.4 04/30/2021    MCV 91.2 04/30/2021    RDW 14.1 04/30/2021     04/30/2021     Lab Results   Component Value Date    INR 1.12 04/29/2021    PROTIME 13.0 04/29/2021       Neuroimaging were independently reviewed by me and discussed with his son. Reviewed notes from different physicians  Reviewed lab and blood testing    Impression:  Acute encephalopathy likely delirium superimposed on chronic cognitive impairment underlying dementia. Could be related to recent GI illness and metabolic encephalopathy. MRI of the brain showed no acute stroke  History of prior CVA  History of TBI  Chronic traumatic encephalopathy  Hard of hearing  Hypertension, not controlled    Recommendation:  Long discussion with the patient's son regarding possibility of poor brain reserve and inability to resist any acute medical illnesses. PT and OT  Fall precautions  Aspirin for stroke prevention  Statin  Blood pressure monitor and continue home blood pressure medications  Discussed risk of falling and injury at home with son who is working on getting home safety evaluation and home health services. Medical work-up for his episodic diarrhea which could be triggering his confusion  Can be discharged from neurology once medically stable giving high risk of hospital-acquired delirium. Nothing to add from neurology at this point  We will follow as needed  Please call for questions. Thank you for referring such patient. If you have any questions regarding my consult note, please don't hesitate to call me. Lissette Almonte MD  [phone]    This dictation was generated by voice recognition computer software.  Although all attempts are made to edit the dictation for accuracy, there may be errors in the  transcription that are not intended

## 2021-04-30 NOTE — PLAN OF CARE
Pt admitted from ED unable to follow commands, attempting to climb out of bed. Swallow screen passed, unable to complete full NIHSS, speech is incomprehensible, family at bedside. Updated on plan of care. NPO since midnight. No c/o abdominal pain/nausea. This AM pt much more alert, able to communicate needs and carry on conversation, vitals are stable, fall precautions in place, call light in reach, camera at bedside. No needs at this time.    Vitals:    04/30/21 0526   BP: (!) 146/80   Pulse: 89   Resp: 18   Temp: 98.8 °F (37.1 °C)   SpO2: 95%       Problem: Falls - Risk of:  Goal: Will remain free from falls  Description: Will remain free from falls  Outcome: Ongoing  Goal: Absence of physical injury  Description: Absence of physical injury  Outcome: Ongoing     Problem: Skin Integrity:  Goal: Will show no infection signs and symptoms  Description: Will show no infection signs and symptoms  Outcome: Ongoing  Goal: Absence of new skin breakdown  Description: Absence of new skin breakdown  Outcome: Ongoing     Problem: Confusion - Acute:  Goal: Absence of continued neurological deterioration signs and symptoms  Description: Absence of continued neurological deterioration signs and symptoms  Outcome: Ongoing  Goal: Mental status will be restored to baseline  Description: Mental status will be restored to baseline  Outcome: Ongoing     Problem: Discharge Planning:  Goal: Ability to perform activities of daily living will improve  Description: Ability to perform activities of daily living will improve  Outcome: Ongoing  Goal: Participates in care planning  Description: Participates in care planning  Outcome: Ongoing     Problem: Injury - Risk of, Physical Injury:  Goal: Will remain free from falls  Description: Will remain free from falls  Outcome: Ongoing  Goal: Absence of physical injury  Description: Absence of physical injury  Outcome: Ongoing     Problem: Mood - Altered:  Goal: Mood stable  Description: Mood stable Outcome: Ongoing  Goal: Absence of abusive behavior  Description: Absence of abusive behavior  Outcome: Ongoing  Goal: Verbalizations of feeling emotionally comfortable while being cared for will increase  Description: Verbalizations of feeling emotionally comfortable while being cared for will increase  Outcome: Ongoing     Problem: Psychomotor Activity - Altered:  Goal: Absence of psychomotor disturbance signs and symptoms  Description: Absence of psychomotor disturbance signs and symptoms  Outcome: Ongoing     Problem: Sensory Perception - Impaired:  Goal: Demonstrations of improved sensory functioning will increase  Description: Demonstrations of improved sensory functioning will increase  Outcome: Ongoing  Goal: Decrease in sensory misperception frequency  Description: Decrease in sensory misperception frequency  Outcome: Ongoing  Goal: Able to refrain from responding to false sensory perceptions  Description: Able to refrain from responding to false sensory perceptions  Outcome: Ongoing  Goal: Demonstrates accurate environmental perceptions  Description: Demonstrates accurate environmental perceptions  Outcome: Ongoing  Goal: Able to distinguish between reality-based and nonreality-based thinking  Description: Able to distinguish between reality-based and nonreality-based thinking  Outcome: Ongoing  Goal: Able to interrupt nonreality-based thinking  Description: Able to interrupt nonreality-based thinking  Outcome: Ongoing     Problem: Sleep Pattern Disturbance:  Goal: Appears well-rested  Description: Appears well-rested  Outcome: Ongoing

## 2021-04-30 NOTE — PROGRESS NOTES
Data- discharge order received, pt verbalized agreement to discharge, disposition to previous residence, no needs for HHC/DME. Action- discharge instructions prepared and given to pt son, pt son verbalized understanding. Medication information packet given r/t NEW and/or CHANGED prescriptions emphasizing name/purpose/side effects, pt verbalized understanding. Discharge instruction summary: Diet- general, Activity- as tolerated. F/u appointment to be scheduled by son, immunizations reviewed, prescription medications filled at [facility]. Response- Pt belongings gathered, IV removed. Disposition is home (no HHC/DME needs), transported with son, taken to lobby via w/c w/ this RN, no complications.

## 2021-04-30 NOTE — CARE COORDINATION
Notified MD via [employer],    met with patient's son again, and the son is still declining home care.

## 2021-05-02 NOTE — DISCHARGE SUMMARY
Hospital Medicine Discharge Summary    Patient ID: Gris Simmons      Patient's PCP: Referring Not In System (Inactive)    Admit Date: 4/29/2021     Discharge Date: 4/30/2021     Admitting Physician: Jairon Zavaleta MD     Discharge Physician: Michael Taylor MD        Active Hospital Problems    Diagnosis    Encephalopathy, metabolic [X89.43]    Dementia due to Alzheimer's disease ([facility] [address] [G30.9, F02.80]    HTN (hypertension), benign [I10]    Chronic abdominal pain [R10.9, G89.29]    Remote history of stroke [Z86.73]       The patient was seen and examined on day of discharge and this discharge summary is in conjunction with any daily progress note from day of discharge. Hospital Course: This  69 y.o. male with history of CVA, traumatic brain injury, who has longstanding history of intermittent confusion, chronic abdominal pain presented to ED with complaints of worsening confusion, blank staring episodes in which he was not responding to his son. Acute encephalopathy:likely delirium superimposed on chronic cognitive impairment/dementia  Initial work-up including urine analysis, CT head and CT abdomen and pelvis unremarkable  Neurology was consulted and patient underwent MRI of the brain which showed no acute stroke and left hemispheric encephalomalacia with diffuse atrophy. Patient's mental status and proved back to baseline and was discharged home in stable condition and was instructed to continue aspirin and statins      Chronic intermittent abdominal pain: Likely secondary to IBS/pylori gastritis  GI was consulted; PPI were increased to twice daily  Plan for an outpatient EGD per GI rec      Physical Exam Performed:     /71   Pulse 76   Temp 98.8 °F (37.1 °C) (Axillary)   Resp 18   Ht 5' 8\" (1.727 m)   Wt 148 lb (67.1 kg)   SpO2 96%   BMI 22.50 kg/m²       General appearance:  No apparent distress, appears stated age and cooperative  Respiratory:  Normal respiratory effort. Clear to auscultation, bilaterally without Rales/Wheezes/Rhonchi. Cardiovascular:  Regular rate and rhythm with normal S1/S2 without murmurs, rubs or gallops. Abdomen: Soft, non-tender, non-distended with normal bowel sounds. Musculoskeletal:  No clubbing, cyanosis or edema bilaterally. Full range of motion without deformity. Skin: Skin color, texture, turgor normal.  No rashes or lesions. Neurologic:  Neurovascularly intact without any focal sensory/motor deficits. Cranial nerves: II-XII intact, grossly non-focal      Labs: For convenience and continuity at follow-up the following most recent labs are provided:    CBC:    Lab Results   Component Value Date    WBC 7.6 04/30/2021    HGB 12.2 04/30/2021    HCT 36.4 04/30/2021     04/30/2021       Renal:    Lab Results   Component Value Date     04/30/2021    K 3.9 04/30/2021    K 4.1 10/09/2018     04/30/2021    CO2 26 04/30/2021    BUN 12 04/30/2021    CREATININE 1.0 04/30/2021    CALCIUM 8.8 04/30/2021         Significant Diagnostic Studies    Radiology:   MRI BRAIN W WO CONTRAST   Final Result   1. No acute intracranial abnormality. No acute infarct. 2. Areas of encephalomalacia with gliosis involving the left frontal, left   parietal and left temporal lobes, which is unchanged the prior exam.  This   may represent areas of prior ischemic or traumatic injury. 3. Mild global parenchymal volume loss with mild-to-moderate chronic   microvascular ischemic changes. 4. Complete opacification of the maxillary sinuses bilaterally. 5. Trace mastoid effusions. CT HEAD WO CONTRAST   Final Result   No acute intracranial abnormality. CT ABDOMEN PELVIS W IV CONTRAST Additional Contrast? None   Final Result   No acute intra-abdominal abnormality         XR CHEST PORTABLE   Final Result   Cardiomegaly with borderline to mild vascular congestion.                 Consults:     IP CONSULT TO PALLIATIVE CARE  IP CONSULT TO SOCIAL WORK IP CONSULT TO GI  IP CONSULT TO NEUROLOGY  IP CONSULT TO NEUROLOGY    Disposition: home     Condition at Discharge: Stable    Discharge Instructions/Follow-up:   Follow up with your PCP within 7-10 days of discharge. Follow up with gastroenterology as instructed for EGD and colonoscopy Dr. Ama Gregorio [phone]  Follow up with neurology as instructed   Take all your medications as prescribed. Discharge Medications:     Discharge Medication List as of 4/30/2021  6:13 PM           Details   pantoprazole (PROTONIX) 40 MG tablet Take 1 tablet by mouth every morning (before breakfast), Disp-30 tablet, R-3Normal              Details   amLODIPine (NORVASC) 10 MG tablet Take 2.5 mg by mouth daily Historical Med      atorvastatin (LIPITOR) 80 MG tablet Take 80 mg by mouth dailyHistorical Med      aspirin 81 MG chewable tablet Take 81 mg by mouth daily Historical Med             Time Spent on discharge is more than 30 minutes in the examination, evaluation, counseling and review of medications and discharge plan. Signed:    Norbert Silva MD   5/2/2021      Thank you Referring Not In System (Inactive) for the opportunity to be involved in this patient's care. If you have any questions or concerns please feel free to contact me at [phone].

## 2021-05-03 LAB
BLOOD CULTURE, ROUTINE: NORMAL
CULTURE, BLOOD 2: NORMAL

## 2021-05-24 NOTE — PROGRESS NOTES
Patient __X_ reached   _____not reached-preop instructions left on voice mail_____________    SPOKE WITH WIFECLEMENTE    DATE__6/1/2021______ TIME__0930______ARRIVAL___0800   FEC______      Nothing to eat or drink after midnight the night before,except for what the prep instructions call for. If you do not have the instructions or do not understand them please contact your doctors office. Follow any instructions your doctors office has given you including what medications to take the AM of your procedure and which ones to hold. You may use your inhalers. If you take a long acting insulin the veronica prior please cut the dose in half and take no diabetic medications that AM.Follow specific doctors office instructions regarding blood thinners and if they want you to hold and for how long. If you are on a blood thinner and have no instructions please contact the office and ask. Dress comfortably,bring your insurance card,picture ID,and a complete list of medications, including supplements. You must have a responsible adult to stay with you during the procedure,drive you home and stay with you. [facility] phone number [phone] for any questions. OTHER INTRUCTIONS(if applicable)_____TAKE AMLODIPINE AND PROTONIX____________________________________________________      COVID TEST         _____ Done ___ where ____       _____ Scheduled ___ where ____       _____Other_________________       __X_ VACCINATED-instructed to bring card      VISITOR POLICY(subject to change)    There is a one visitor policy at [facility] for all surgeries and endoscopies. Whether the visitor can stay or will be asked to wait in the car will depend on the current policy and if social distancing can be maintained. The policy is subject to change at any time. Please make sure the visitor has a cell phone that is on,charged and able to accept calls, as this may be the way that the staff communicates with them. Pain management is NO VISITOR policyThe

## 2021-06-01 ENCOUNTER — ANESTHESIA (OUTPATIENT)
Dept: ENDOSCOPY | Age: 71
End: 2021-06-01
Payer: COMMERCIAL

## 2021-06-01 ENCOUNTER — HOSPITAL ENCOUNTER (OUTPATIENT)
Age: 71
Setting detail: OUTPATIENT SURGERY
Discharge: HOME | End: 2021-06-01
Attending: INTERNAL MEDICINE | Admitting: INTERNAL MEDICINE
Payer: COMMERCIAL

## 2021-06-01 ENCOUNTER — ANESTHESIA EVENT (OUTPATIENT)
Dept: ENDOSCOPY | Age: 71
End: 2021-06-01
Payer: COMMERCIAL

## 2021-06-01 VITALS
DIASTOLIC BLOOD PRESSURE: 94 MMHG | WEIGHT: 148 LBS | OXYGEN SATURATION: 99 % | BODY MASS INDEX: 22.43 KG/M2 | HEIGHT: 68 IN | SYSTOLIC BLOOD PRESSURE: 170 MMHG | TEMPERATURE: 98.2 F | HEART RATE: 75 BPM | RESPIRATION RATE: 16 BRPM

## 2021-06-01 VITALS
SYSTOLIC BLOOD PRESSURE: 164 MMHG | DIASTOLIC BLOOD PRESSURE: 94 MMHG | RESPIRATION RATE: 17 BRPM | OXYGEN SATURATION: 100 %

## 2021-06-01 PROCEDURE — 2709999900 HC NON-CHARGEABLE SUPPLY: Performed by: INTERNAL MEDICINE

## 2021-06-01 PROCEDURE — 3700000001 HC ADD 15 MINUTES (ANESTHESIA): Performed by: INTERNAL MEDICINE

## 2021-06-01 PROCEDURE — 2580000003 HC RX 258: Performed by: ANESTHESIOLOGY

## 2021-06-01 PROCEDURE — 3609012400 HC EGD TRANSORAL BIOPSY SINGLE/MULTIPLE: Performed by: INTERNAL MEDICINE

## 2021-06-01 PROCEDURE — 88305 TISSUE EXAM BY PATHOLOGIST: CPT

## 2021-06-01 PROCEDURE — 2500000003 HC RX 250 WO HCPCS: Performed by: NURSE ANESTHETIST, CERTIFIED REGISTERED

## 2021-06-01 PROCEDURE — 7100000010 HC PHASE II RECOVERY - FIRST 15 MIN: Performed by: INTERNAL MEDICINE

## 2021-06-01 PROCEDURE — 6360000002 HC RX W HCPCS: Performed by: NURSE ANESTHETIST, CERTIFIED REGISTERED

## 2021-06-01 PROCEDURE — 3700000000 HC ANESTHESIA ATTENDED CARE: Performed by: INTERNAL MEDICINE

## 2021-06-01 PROCEDURE — 7100000011 HC PHASE II RECOVERY - ADDTL 15 MIN: Performed by: INTERNAL MEDICINE

## 2021-06-01 RX ORDER — PROPOFOL 10 MG/ML
INJECTION, EMULSION INTRAVENOUS CONTINUOUS PRN
Status: DISCONTINUED | OUTPATIENT
Start: 2021-06-01 | End: 2021-06-01 | Stop reason: SDUPTHER

## 2021-06-01 RX ORDER — PROPOFOL 10 MG/ML
INJECTION, EMULSION INTRAVENOUS PRN
Status: DISCONTINUED | OUTPATIENT
Start: 2021-06-01 | End: 2021-06-01 | Stop reason: SDUPTHER

## 2021-06-01 RX ORDER — SODIUM CHLORIDE 9 MG/ML
INJECTION, SOLUTION INTRAVENOUS CONTINUOUS
Status: DISCONTINUED | OUTPATIENT
Start: 2021-06-01 | End: 2021-06-01 | Stop reason: HOSPADM

## 2021-06-01 RX ORDER — LIDOCAINE HYDROCHLORIDE 20 MG/ML
INJECTION, SOLUTION EPIDURAL; INFILTRATION; INTRACAUDAL; PERINEURAL PRN
Status: DISCONTINUED | OUTPATIENT
Start: 2021-06-01 | End: 2021-06-01 | Stop reason: SDUPTHER

## 2021-06-01 RX ADMIN — SODIUM CHLORIDE: 9 INJECTION, SOLUTION INTRAVENOUS at 08:49

## 2021-06-01 RX ADMIN — LIDOCAINE HYDROCHLORIDE 50 MG: 20 INJECTION, SOLUTION EPIDURAL; INFILTRATION; INTRACAUDAL; PERINEURAL at 09:30

## 2021-06-01 RX ADMIN — PROPOFOL 120 MCG/KG/MIN: 10 INJECTION, EMULSION INTRAVENOUS at 09:30

## 2021-06-01 RX ADMIN — PROPOFOL 50 MG: 10 INJECTION, EMULSION INTRAVENOUS at 09:30

## 2021-06-01 ASSESSMENT — ENCOUNTER SYMPTOMS: SHORTNESS OF BREATH: 0

## 2021-06-01 ASSESSMENT — PAIN - FUNCTIONAL ASSESSMENT: PAIN_FUNCTIONAL_ASSESSMENT: 0-10

## 2021-06-01 NOTE — ANESTHESIA POSTPROCEDURE EVALUATION
Department of Anesthesiology  Postprocedure Note    Patient: Gris Simmons  MRN: [id number]  YOB: 1950  Date of evaluation: 6/1/2021  Time:  9:46 AM     Procedure Summary     Date: 06/01/21 Room / Location: [address] [place] [address] / [facility]    Anesthesia Start: 4852 Anesthesia Stop: 4344    Procedure: EGD BIOPSY (N/A Abdomen) Diagnosis: (NAUSEA AND/OR VOMITING R11.2)    Surgeons: Leigh Contreras MD Responsible Provider: Savi Capone MD    Anesthesia Type: MAC ASA Status: 3          Anesthesia Type: MAC    Tate Phase I: Tate Score: 10    Tate Phase II:      Last vitals: Reviewed and per EMR flowsheets.        Anesthesia Post Evaluation    Patient location during evaluation: PACU  Level of consciousness: awake  Airway patency: patent  Complications: no  Cardiovascular status: hemodynamically stable  Respiratory status: acceptable

## 2021-06-01 NOTE — PROGRESS NOTES
Post-procedure education reviewed with patient and visitor, and they verbalized understanding. Also,instructed patient and wife for patient to take his routine blood pressure med once home. Comprehension noted.

## 2021-06-01 NOTE — ANESTHESIA PRE PROCEDURE
Substance Use Topics    Alcohol use: No                                Counseling given: Not Answered      Vital Signs (Current):   Vitals:    05/24/21 0904   Weight: 148 lb (67.1 kg)   Height: 5' 8\" (1.727 m)                                              BP Readings from Last 3 Encounters:   04/30/21 129/71   01/22/20 (!) 153/77   08/25/19 128/63       NPO Status:                                                                                 BMI:   Wt Readings from Last 3 Encounters:   05/24/21 148 lb (67.1 kg)   04/29/21 148 lb (67.1 kg)   10/09/18 148 lb (67.1 kg)     Body mass index is 22.5 kg/m². CBC:   Lab Results   Component Value Date    WBC 7.6 04/30/2021    RBC 3.99 04/30/2021    HGB 12.2 04/30/2021    HCT 36.4 04/30/2021    MCV 91.2 04/30/2021    RDW 14.1 04/30/2021     04/30/2021       CMP:   Lab Results   Component Value Date     04/30/2021    K 3.9 04/30/2021    K 4.1 10/09/2018     04/30/2021    CO2 26 04/30/2021    BUN 12 04/30/2021    CREATININE 1.0 04/30/2021    GFRAA >60 04/30/2021    AGRATIO 1.6 04/29/2021    LABGLOM >60 04/30/2021    GLUCOSE 95 04/30/2021    PROT 7.3 04/29/2021    CALCIUM 8.8 04/30/2021    BILITOT 0.9 04/29/2021    ALKPHOS 86 04/29/2021    AST 15 04/29/2021    ALT 11 04/29/2021       POC Tests: No results for input(s): POCGLU, POCNA, POCK, POCCL, POCBUN, POCHEMO, POCHCT in the last 72 hours.     Coags:   Lab Results   Component Value Date    PROTIME 13.0 04/29/2021    INR 1.12 04/29/2021    APTT 31.5 04/29/2021       HCG (If Applicable): No results found for: PREGTESTUR, PREGSERUM, HCG, HCGQUANT     ABGs: No results found for: PHART, PO2ART, CYM7LIV, SLL9ENA, BEART, F5BBDPOY     Type & Screen (If Applicable):  No results found for: LABABO, LABRH    Drug/Infectious Status (If Applicable):  No results found for: HIV, HEPCAB    COVID-19 Screening (If Applicable): No results found for: COVID19        Anesthesia Evaluation  Patient summary reviewed and Nursing notes reviewed no history of anesthetic complications:   Airway: Mallampati: I  TM distance: >3 FB   Neck ROM: full  Mouth opening: > = 3 FB Dental:      Comment: Multiple loose and broken    Pulmonary:       (-) asthma and shortness of breath                           Cardiovascular:    (+) hypertension:, hyperlipidemia    (-)  angina                Neuro/Psych:   (+) CVA:, psychiatric history:            GI/Hepatic/Renal:        (-) GERD and liver disease       Endo/Other:        (-) diabetes mellitus, hypothyroidism               Abdominal:           Vascular:     - PVD. Anesthesia Plan      MAC     ASA 3       Induction: intravenous. Anesthetic plan and risks discussed with healthcare power of . Plan discussed with CRNA.                   Jammie Barrios MD   6/1/2021

## 2021-06-01 NOTE — H&P
no functional limitations    Mallampati Class:  Class I: Soft palate, uvula, fauces, pillars visible  __________  Class II: Soft palate, uvula, fauces visible  ____X______   Class III: Soft palate, base of uvula visible  __________  Class IV: Hard palate only visible   __________      ASSESSMENT AND PLAN:    1. Patient is a 69 y.o. male here for EGD  with deep sedation  2. Procedure options, risks and benefits reviewed with patient. Patient expresses understanding.       MD Delicia Lares  06/01/21

## 2021-06-01 NOTE — PROGRESS NOTES
Brought wife to bedside per W/C. Gave wife, patent's x1 hearing aid in blue cup. Wife place hearing aid in her purse.

## 2024-04-09 ENCOUNTER — APPOINTMENT (OUTPATIENT)
Dept: GENERAL RADIOLOGY | Age: 74
DRG: 065 | End: 2024-04-09
Payer: COMMERCIAL

## 2024-04-09 ENCOUNTER — HOSPITAL ENCOUNTER (INPATIENT)
Age: 74
LOS: 5 days | Discharge: NURSING, REHAB OR LONG-TERM CARE | DRG: 065 | End: 2024-04-14
Attending: INTERNAL MEDICINE | Admitting: INTERNAL MEDICINE
Payer: COMMERCIAL

## 2024-04-09 ENCOUNTER — APPOINTMENT (OUTPATIENT)
Dept: CT IMAGING | Age: 74
DRG: 065 | End: 2024-04-09
Payer: COMMERCIAL

## 2024-04-09 ENCOUNTER — APPOINTMENT (OUTPATIENT)
Dept: MRI IMAGING | Age: 74
DRG: 065 | End: 2024-04-09
Payer: COMMERCIAL

## 2024-04-09 DIAGNOSIS — R29.6 FREQUENT FALLS: Primary | ICD-10-CM

## 2024-04-09 PROBLEM — G93.40 ACUTE ENCEPHALOPATHY: Status: ACTIVE | Noted: 2024-04-09

## 2024-04-09 PROBLEM — I63.9 CVA (CEREBRAL VASCULAR ACCIDENT) (HCC): Status: ACTIVE | Noted: 2024-04-09

## 2024-04-09 LAB
ANION GAP SERPL CALCULATED.3IONS-SCNC: 9 MMOL/L (ref 3–16)
BASE EXCESS BLDV CALC-SCNC: 3 MMOL/L (ref -3–3)
BASOPHILS # BLD: 0 K/UL (ref 0–0.2)
BASOPHILS NFR BLD: 0.6 %
BILIRUB UR QL STRIP.AUTO: NEGATIVE
BUN SERPL-MCNC: 22 MG/DL (ref 7–20)
CALCIUM SERPL-MCNC: 8.7 MG/DL (ref 8.3–10.6)
CHLORIDE SERPL-SCNC: 105 MMOL/L (ref 99–110)
CLARITY UR: CLEAR
CO2 BLDV-SCNC: 71 MMOL/L
CO2 SERPL-SCNC: 26 MMOL/L (ref 21–32)
COHGB MFR BLDV: 2.4 % (ref 0–1.5)
COLOR UR: YELLOW
CREAT SERPL-MCNC: 1.1 MG/DL (ref 0.8–1.3)
DEPRECATED RDW RBC AUTO: 14.8 % (ref 12.4–15.4)
DO-HGB MFR BLDV: 16 %
EKG ATRIAL RATE: 65 BPM
EKG DIAGNOSIS: NORMAL
EKG P AXIS: 5 DEGREES
EKG P-R INTERVAL: 156 MS
EKG Q-T INTERVAL: 428 MS
EKG QRS DURATION: 68 MS
EKG QTC CALCULATION (BAZETT): 445 MS
EKG R AXIS: 52 DEGREES
EKG T AXIS: 1 DEGREES
EKG VENTRICULAR RATE: 65 BPM
EOSINOPHIL # BLD: 0.1 K/UL (ref 0–0.6)
EOSINOPHIL NFR BLD: 1.2 %
GFR SERPLBLD CREATININE-BSD FMLA CKD-EPI: 70 ML/MIN/{1.73_M2}
GLUCOSE BLD-MCNC: 114 MG/DL (ref 70–99)
GLUCOSE SERPL-MCNC: 100 MG/DL (ref 70–99)
GLUCOSE UR STRIP.AUTO-MCNC: >=1000 MG/DL
HCO3 BLDV-SCNC: 29.8 MMOL/L (ref 23–29)
HCT VFR BLD AUTO: 34.1 % (ref 40.5–52.5)
HGB BLD-MCNC: 11.6 G/DL (ref 13.5–17.5)
HGB UR QL STRIP.AUTO: NEGATIVE
KETONES UR STRIP.AUTO-MCNC: NEGATIVE MG/DL
LEUKOCYTE ESTERASE UR QL STRIP.AUTO: NEGATIVE
LYMPHOCYTES # BLD: 1 K/UL (ref 1–5.1)
LYMPHOCYTES NFR BLD: 17.8 %
MCH RBC QN AUTO: 31.1 PG (ref 26–34)
MCHC RBC AUTO-ENTMCNC: 33.9 G/DL (ref 31–36)
MCV RBC AUTO: 91.7 FL (ref 80–100)
METHGB MFR BLDV: 0.5 %
MONOCYTES # BLD: 0.5 K/UL (ref 0–1.3)
MONOCYTES NFR BLD: 8.7 %
NEUTROPHILS # BLD: 4.1 K/UL (ref 1.7–7.7)
NEUTROPHILS NFR BLD: 71.7 %
NITRITE UR QL STRIP.AUTO: NEGATIVE
NT-PROBNP SERPL-MCNC: 304 PG/ML (ref 0–449)
O2 CT VFR BLDV CALC: 13 VOL %
O2 THERAPY: ABNORMAL
PCO2 BLDV: 55.5 MMHG (ref 40–50)
PERFORMED ON: ABNORMAL
PH BLDV: 7.34 [PH] (ref 7.35–7.45)
PH UR STRIP.AUTO: 6.5 [PH] (ref 5–8)
PLATELET # BLD AUTO: 119 K/UL (ref 135–450)
PMV BLD AUTO: 8.7 FL (ref 5–10.5)
PO2 BLDV: 52.6 MMHG (ref 25–40)
POTASSIUM SERPL-SCNC: 4.5 MMOL/L (ref 3.5–5.1)
PROT UR STRIP.AUTO-MCNC: NEGATIVE MG/DL
RBC # BLD AUTO: 3.72 M/UL (ref 4.2–5.9)
SAO2 % BLDV: 83 %
SODIUM SERPL-SCNC: 140 MMOL/L (ref 136–145)
SP GR UR STRIP.AUTO: 1.02 (ref 1–1.03)
TROPONIN, HIGH SENSITIVITY: 17 NG/L (ref 0–22)
UA COMPLETE W REFLEX CULTURE PNL UR: ABNORMAL
UA DIPSTICK W REFLEX MICRO PNL UR: ABNORMAL
URN SPEC COLLECT METH UR: ABNORMAL
UROBILINOGEN UR STRIP-ACNC: 0.2 E.U./DL
WBC # BLD AUTO: 5.7 K/UL (ref 4–11)

## 2024-04-09 PROCEDURE — 2580000003 HC RX 258: Performed by: INTERNAL MEDICINE

## 2024-04-09 PROCEDURE — 71045 X-RAY EXAM CHEST 1 VIEW: CPT

## 2024-04-09 PROCEDURE — 93010 ELECTROCARDIOGRAM REPORT: CPT | Performed by: INTERNAL MEDICINE

## 2024-04-09 PROCEDURE — 82803 BLOOD GASES ANY COMBINATION: CPT

## 2024-04-09 PROCEDURE — 70450 CT HEAD/BRAIN W/O DYE: CPT

## 2024-04-09 PROCEDURE — 70498 CT ANGIOGRAPHY NECK: CPT

## 2024-04-09 PROCEDURE — 85025 COMPLETE CBC W/AUTO DIFF WBC: CPT

## 2024-04-09 PROCEDURE — 6360000002 HC RX W HCPCS: Performed by: INTERNAL MEDICINE

## 2024-04-09 PROCEDURE — 6370000000 HC RX 637 (ALT 250 FOR IP): Performed by: INTERNAL MEDICINE

## 2024-04-09 PROCEDURE — 81003 URINALYSIS AUTO W/O SCOPE: CPT

## 2024-04-09 PROCEDURE — 99285 EMERGENCY DEPT VISIT HI MDM: CPT

## 2024-04-09 PROCEDURE — 99223 1ST HOSP IP/OBS HIGH 75: CPT | Performed by: PSYCHIATRY & NEUROLOGY

## 2024-04-09 PROCEDURE — 93005 ELECTROCARDIOGRAM TRACING: CPT | Performed by: INTERNAL MEDICINE

## 2024-04-09 PROCEDURE — 70551 MRI BRAIN STEM W/O DYE: CPT

## 2024-04-09 PROCEDURE — 6360000004 HC RX CONTRAST MEDICATION: Performed by: INTERNAL MEDICINE

## 2024-04-09 PROCEDURE — 73560 X-RAY EXAM OF KNEE 1 OR 2: CPT

## 2024-04-09 PROCEDURE — 80048 BASIC METABOLIC PNL TOTAL CA: CPT

## 2024-04-09 PROCEDURE — 83880 ASSAY OF NATRIURETIC PEPTIDE: CPT

## 2024-04-09 PROCEDURE — 2060000000 HC ICU INTERMEDIATE R&B

## 2024-04-09 PROCEDURE — 84484 ASSAY OF TROPONIN QUANT: CPT

## 2024-04-09 PROCEDURE — 36415 COLL VENOUS BLD VENIPUNCTURE: CPT

## 2024-04-09 RX ORDER — METOPROLOL SUCCINATE 50 MG/1
50 TABLET, EXTENDED RELEASE ORAL DAILY
COMMUNITY
Start: 2024-03-14

## 2024-04-09 RX ORDER — SODIUM CHLORIDE 9 MG/ML
INJECTION, SOLUTION INTRAVENOUS PRN
Status: DISCONTINUED | OUTPATIENT
Start: 2024-04-09 | End: 2024-04-11

## 2024-04-09 RX ORDER — ACETAMINOPHEN 650 MG/1
650 SUPPOSITORY RECTAL EVERY 6 HOURS PRN
Status: DISCONTINUED | OUTPATIENT
Start: 2024-04-09 | End: 2024-04-14 | Stop reason: HOSPADM

## 2024-04-09 RX ORDER — ACETAMINOPHEN 325 MG/1
650 TABLET ORAL EVERY 6 HOURS PRN
Status: DISCONTINUED | OUTPATIENT
Start: 2024-04-09 | End: 2024-04-14 | Stop reason: HOSPADM

## 2024-04-09 RX ORDER — LABETALOL HYDROCHLORIDE 5 MG/ML
10 INJECTION, SOLUTION INTRAVENOUS EVERY 10 MIN PRN
Status: DISCONTINUED | OUTPATIENT
Start: 2024-04-09 | End: 2024-04-11

## 2024-04-09 RX ORDER — EPLERENONE 25 MG/1
12.5 TABLET, FILM COATED ORAL DAILY
COMMUNITY
Start: 2024-03-14

## 2024-04-09 RX ORDER — SODIUM CHLORIDE 0.9 % (FLUSH) 0.9 %
5-40 SYRINGE (ML) INJECTION EVERY 12 HOURS SCHEDULED
Status: DISCONTINUED | OUTPATIENT
Start: 2024-04-09 | End: 2024-04-11

## 2024-04-09 RX ORDER — ENOXAPARIN SODIUM 100 MG/ML
40 INJECTION SUBCUTANEOUS NIGHTLY
Status: DISCONTINUED | OUTPATIENT
Start: 2024-04-09 | End: 2024-04-14 | Stop reason: HOSPADM

## 2024-04-09 RX ORDER — CETIRIZINE HYDROCHLORIDE 10 MG/1
10 TABLET ORAL DAILY
COMMUNITY
Start: 2016-05-05

## 2024-04-09 RX ORDER — NICOTINE 21 MG/24HR
1 PATCH, TRANSDERMAL 24 HOURS TRANSDERMAL DAILY
Status: DISCONTINUED | OUTPATIENT
Start: 2024-04-09 | End: 2024-04-10

## 2024-04-09 RX ORDER — SODIUM CHLORIDE 0.9 % (FLUSH) 0.9 %
10 SYRINGE (ML) INJECTION PRN
Status: DISCONTINUED | OUTPATIENT
Start: 2024-04-09 | End: 2024-04-11

## 2024-04-09 RX ORDER — MAGNESIUM SULFATE IN WATER 40 MG/ML
2000 INJECTION, SOLUTION INTRAVENOUS PRN
Status: DISCONTINUED | OUTPATIENT
Start: 2024-04-09 | End: 2024-04-11

## 2024-04-09 RX ORDER — ONDANSETRON 2 MG/ML
4 INJECTION INTRAMUSCULAR; INTRAVENOUS EVERY 6 HOURS PRN
Status: DISCONTINUED | OUTPATIENT
Start: 2024-04-09 | End: 2024-04-09 | Stop reason: ALTCHOICE

## 2024-04-09 RX ORDER — ONDANSETRON 2 MG/ML
4 INJECTION INTRAMUSCULAR; INTRAVENOUS EVERY 6 HOURS PRN
Status: DISCONTINUED | OUTPATIENT
Start: 2024-04-09 | End: 2024-04-14 | Stop reason: HOSPADM

## 2024-04-09 RX ORDER — AMLODIPINE BESYLATE 5 MG/1
2.5 TABLET ORAL DAILY
Status: DISCONTINUED | OUTPATIENT
Start: 2024-04-09 | End: 2024-04-09

## 2024-04-09 RX ORDER — POLYETHYLENE GLYCOL 3350 17 G/17G
17 POWDER, FOR SOLUTION ORAL DAILY PRN
Status: DISCONTINUED | OUTPATIENT
Start: 2024-04-09 | End: 2024-04-14 | Stop reason: HOSPADM

## 2024-04-09 RX ORDER — POTASSIUM CHLORIDE 20 MEQ/1
40 TABLET, EXTENDED RELEASE ORAL PRN
Status: DISCONTINUED | OUTPATIENT
Start: 2024-04-09 | End: 2024-04-09 | Stop reason: SDUPTHER

## 2024-04-09 RX ORDER — LANOLIN ALCOHOL/MO/W.PET/CERES
3 CREAM (GRAM) TOPICAL NIGHTLY
Status: DISCONTINUED | OUTPATIENT
Start: 2024-04-09 | End: 2024-04-14 | Stop reason: HOSPADM

## 2024-04-09 RX ORDER — SODIUM CHLORIDE 0.9 % (FLUSH) 0.9 %
5-40 SYRINGE (ML) INJECTION PRN
Status: DISCONTINUED | OUTPATIENT
Start: 2024-04-09 | End: 2024-04-11

## 2024-04-09 RX ORDER — PANTOPRAZOLE SODIUM 40 MG/1
40 TABLET, DELAYED RELEASE ORAL
Status: DISCONTINUED | OUTPATIENT
Start: 2024-04-10 | End: 2024-04-09

## 2024-04-09 RX ORDER — MV-MN/IRON/FA/K1/RESV/LUT/HERB 18 MG-240
1 TABLET ORAL DAILY
COMMUNITY

## 2024-04-09 RX ORDER — OMEPRAZOLE 20 MG/1
20 CAPSULE, DELAYED RELEASE ORAL DAILY
Status: ON HOLD | COMMUNITY
Start: 2020-03-25 | End: 2024-04-19

## 2024-04-09 RX ORDER — LANOLIN ALCOHOL/MO/W.PET/CERES
3 CREAM (GRAM) TOPICAL NIGHTLY PRN
COMMUNITY
Start: 2024-03-13

## 2024-04-09 RX ORDER — MAGNESIUM SULFATE IN WATER 40 MG/ML
2000 INJECTION, SOLUTION INTRAVENOUS PRN
Status: DISCONTINUED | OUTPATIENT
Start: 2024-04-09 | End: 2024-04-09 | Stop reason: SDUPTHER

## 2024-04-09 RX ORDER — LORAZEPAM 2 MG/ML
INJECTION INTRAMUSCULAR
Status: DISPENSED
Start: 2024-04-09 | End: 2024-04-09

## 2024-04-09 RX ORDER — ONDANSETRON 4 MG/1
4 TABLET, ORALLY DISINTEGRATING ORAL EVERY 8 HOURS PRN
Status: DISCONTINUED | OUTPATIENT
Start: 2024-04-09 | End: 2024-04-09 | Stop reason: ALTCHOICE

## 2024-04-09 RX ORDER — ATORVASTATIN CALCIUM 80 MG/1
80 TABLET, FILM COATED ORAL NIGHTLY
Status: DISCONTINUED | OUTPATIENT
Start: 2024-04-09 | End: 2024-04-14 | Stop reason: HOSPADM

## 2024-04-09 RX ORDER — POLYETHYLENE GLYCOL 3350 17 G/17G
17 POWDER, FOR SOLUTION ORAL DAILY PRN
Status: DISCONTINUED | OUTPATIENT
Start: 2024-04-09 | End: 2024-04-09 | Stop reason: SDUPTHER

## 2024-04-09 RX ORDER — ATORVASTATIN CALCIUM 80 MG/1
80 TABLET, FILM COATED ORAL DAILY
Status: DISCONTINUED | OUTPATIENT
Start: 2024-04-09 | End: 2024-04-09 | Stop reason: SDUPTHER

## 2024-04-09 RX ORDER — POTASSIUM CHLORIDE 7.45 MG/ML
10 INJECTION INTRAVENOUS PRN
Status: DISCONTINUED | OUTPATIENT
Start: 2024-04-09 | End: 2024-04-14 | Stop reason: HOSPADM

## 2024-04-09 RX ORDER — POTASSIUM CHLORIDE 7.45 MG/ML
10 INJECTION INTRAVENOUS PRN
Status: DISCONTINUED | OUTPATIENT
Start: 2024-04-09 | End: 2024-04-09 | Stop reason: SDUPTHER

## 2024-04-09 RX ORDER — ACETAMINOPHEN 650 MG/1
650 SUPPOSITORY RECTAL EVERY 6 HOURS PRN
Status: DISCONTINUED | OUTPATIENT
Start: 2024-04-09 | End: 2024-04-09 | Stop reason: SDUPTHER

## 2024-04-09 RX ORDER — SODIUM CHLORIDE 0.9 % (FLUSH) 0.9 %
10 SYRINGE (ML) INJECTION EVERY 12 HOURS SCHEDULED
Status: DISCONTINUED | OUTPATIENT
Start: 2024-04-09 | End: 2024-04-11

## 2024-04-09 RX ORDER — ACETAMINOPHEN 500 MG
500 TABLET ORAL EVERY 6 HOURS PRN
COMMUNITY
Start: 2018-05-21

## 2024-04-09 RX ORDER — ACETAMINOPHEN 325 MG/1
650 TABLET ORAL EVERY 6 HOURS PRN
Status: DISCONTINUED | OUTPATIENT
Start: 2024-04-09 | End: 2024-04-09 | Stop reason: SDUPTHER

## 2024-04-09 RX ORDER — POTASSIUM CHLORIDE 20 MEQ/1
40 TABLET, EXTENDED RELEASE ORAL PRN
Status: DISCONTINUED | OUTPATIENT
Start: 2024-04-09 | End: 2024-04-14 | Stop reason: HOSPADM

## 2024-04-09 RX ORDER — PROMETHAZINE HYDROCHLORIDE 25 MG/1
12.5 TABLET ORAL EVERY 6 HOURS PRN
Status: DISCONTINUED | OUTPATIENT
Start: 2024-04-09 | End: 2024-04-14 | Stop reason: HOSPADM

## 2024-04-09 RX ORDER — ASPIRIN 81 MG/1
81 TABLET, CHEWABLE ORAL DAILY
Status: DISCONTINUED | OUTPATIENT
Start: 2024-04-09 | End: 2024-04-14 | Stop reason: HOSPADM

## 2024-04-09 RX ADMIN — SODIUM CHLORIDE, PRESERVATIVE FREE 10 ML: 5 INJECTION INTRAVENOUS at 22:34

## 2024-04-09 RX ADMIN — ATORVASTATIN CALCIUM 80 MG: 80 TABLET, FILM COATED ORAL at 22:34

## 2024-04-09 RX ADMIN — ENOXAPARIN SODIUM 40 MG: 100 INJECTION SUBCUTANEOUS at 22:34

## 2024-04-09 RX ADMIN — MELATONIN TAB 3 MG 3 MG: 3 TAB at 22:34

## 2024-04-09 RX ADMIN — IOPAMIDOL 75 ML: 755 INJECTION, SOLUTION INTRAVENOUS at 04:10

## 2024-04-09 RX ADMIN — SODIUM CHLORIDE, PRESERVATIVE FREE 2 MG: 5 INJECTION INTRAVENOUS at 09:39

## 2024-04-09 RX ADMIN — SODIUM CHLORIDE, PRESERVATIVE FREE 10 ML: 5 INJECTION INTRAVENOUS at 16:00

## 2024-04-09 ASSESSMENT — PAIN - FUNCTIONAL ASSESSMENT: PAIN_FUNCTIONAL_ASSESSMENT: 0-10

## 2024-04-09 ASSESSMENT — LIFESTYLE VARIABLES
HOW OFTEN DO YOU HAVE A DRINK CONTAINING ALCOHOL: NEVER
HOW MANY STANDARD DRINKS CONTAINING ALCOHOL DO YOU HAVE ON A TYPICAL DAY: PATIENT DOES NOT DRINK

## 2024-04-09 ASSESSMENT — PAIN SCALES - GENERAL
PAINLEVEL_OUTOF10: 0
PAINLEVEL_OUTOF10: 0

## 2024-04-09 NOTE — ED NOTES
This RN spoke with patients son. [facility] pt has been having issues with walking over the past month. [facility] pt has been admitted and had full work ups done and cannot figure out what is going on. [facility] pt has neurology appt to be screened for alzheimers/dementia next month.

## 2024-04-09 NOTE — ED PROVIDER NOTES
EMERGENCY MEDICINE PROVIDER NOTE    Patient Identification  Pt Name: Maximo Camacho  MRN: [id number]  Birthdate 1950  Date of evaluation: 4/9/2024  Provider: MISSY AMADOR DO  PCP: System, Referring Not In (Inactive)    Chief Complaint  Altered Mental Status (Pt brought in by [facility] from home. Pt was found by niece, standing in the laundry room. Pt was supposed to be in bed. EMS states pt is leaning hard to the left when walking. Pt has past hx of strokes. Pt alert but disoriented. )      HPI  (History provided by patient)  This is a 73 y.o. male who was brought in by EMS transportation for altered mental status.  Family noticed altered mental status tonight.  The time of onset is unknown.  He was walking around and seemed confused.  They also noticed that he was also leaning to the left.  When he first arrived he would not speak.  I started to examine him he started to nod his head yes and no and then spoke a few words.  He is complaining of left knee pain.    I have reviewed the following nursing documentation:  Allergies: Patient has no known allergies.    Past medical history:   Past Medical History:   Diagnosis Date    Allergic rhinitis     Cerebral artery occlusion with cerebral infarction (HCC) 2016    Dementia (HCC)     Hyperlipidemia     Hypertension     Injury brain, traumatic (HCC)     Polyp, stomach      Past surgical history:   Past Surgical History:   Procedure Laterality Date    COLONOSCOPY      UPPER GASTROINTESTINAL ENDOSCOPY  08/15/2016    UPPER GASTROINTESTINAL ENDOSCOPY N/A 6/1/2021    EGD BIOPSY performed by Keanu Santana MD at [facility] ENDOSCOPY       Home medications:   Previous Medications    AMLODIPINE (NORVASC) 10 MG TABLET    Take 2.5 mg by mouth daily     ASPIRIN 81 MG CHEWABLE TABLET    Take 81 mg by mouth daily     ATORVASTATIN (LIPITOR) 80 MG TABLET    Take 80 mg by mouth daily    PANTOPRAZOLE (PROTONIX) 40 MG TABLET    Take 1 tablet by mouth every morning (before breakfast)

## 2024-04-09 NOTE — ED NOTES
MRI called back at this time and states that pt is trying to get out of ED stretcher and will not lay back, states they will not attempt MRI or putting him on their bed due to this at this time. Hospitalist sent a message with perfect serve at this time, awaiting call back. MRI to bring patient back to ED room 1.

## 2024-04-09 NOTE — ED NOTES
ED TO INPATIENT SBAR HANDOFF    Patient Name: Maximo Camacho   :  1950  73 y.o.   MRN:  [id number]  Preferred Name  Maximo  ED Room #:  ED-0001/01  Family/Caregiver Present yes   Restraints no   Sitter no   Sepsis Risk Score Sepsis Risk Score: 0.83    Situation  Code Status: Prior No additional code details.    Allergies: Patient has no known allergies.  Weight: Patient Vitals for the past 96 hrs (Last 3 readings):   Weight   24 0200 68 kg (150 lb)     Arrived from: home  Chief Complaint:   Chief Complaint   Patient presents with    Altered Mental Status     Pt brought in by [facility] from home. Pt was found by niece, standing in the laundry room. Pt was supposed to be in bed. EMS states pt is leaning hard to the left when walking. Pt has past hx of strokes. Pt alert but disoriented.      Hospital Problem/Diagnosis:  Principal Problem:    Acute encephalopathy  Resolved Problems:    * No resolved hospital problems. *    Imaging:   CTA HEAD NECK W CONTRAST   Preliminary Result   1. No evidence of intracranial hemorrhage.   2. Old posttraumatic encephalomalacia in the anterior portion of left   temporal lobe, anteroinferior portion of left frontal lobe and minimal   similar change in the anteroinferior portion of right frontal lobe.   3. New hypodense focus in the white matter of the mid anterior portion of the   right frontal lobe, probably sequela of old trauma or old infarction or   subacute infarction without mass effect. Follow-up MRI of brain T commended.   4. No significant stenosis of the cervical carotid or vertebral arteries.   5. No compromise in the intracranial anterior circulation of bilateral   internal carotid arteries.   6. 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  Mild stenosis in the proximal left posterior cerebral artery.  No   additional significant compromise in the vertebrobasilar arterial circulation.   7. No acute osseous

## 2024-04-09 NOTE — ED NOTES
Pt to MRI via bed at this time. Son at bedside, plan of care discussed at this time, questions answered.

## 2024-04-10 LAB
ALBUMIN SERPL-MCNC: 3.7 G/DL (ref 3.4–5)
ALBUMIN/GLOB SERPL: 1.5 {RATIO} (ref 1.1–2.2)
ALP SERPL-CCNC: 62 U/L (ref 40–129)
ALT SERPL-CCNC: 7 U/L (ref 10–40)
ANION GAP SERPL CALCULATED.3IONS-SCNC: 9 MMOL/L (ref 3–16)
AST SERPL-CCNC: 11 U/L (ref 15–37)
BASOPHILS # BLD: 0 K/UL (ref 0–0.2)
BASOPHILS NFR BLD: 0.7 %
BILIRUB SERPL-MCNC: 0.4 MG/DL (ref 0–1)
BUN SERPL-MCNC: 18 MG/DL (ref 7–20)
CALCIUM SERPL-MCNC: 9.1 MG/DL (ref 8.3–10.6)
CHLORIDE SERPL-SCNC: 107 MMOL/L (ref 99–110)
CHOLEST SERPL-MCNC: 191 MG/DL (ref 0–199)
CO2 SERPL-SCNC: 26 MMOL/L (ref 21–32)
CREAT SERPL-MCNC: 1 MG/DL (ref 0.8–1.3)
DEPRECATED RDW RBC AUTO: 14.6 % (ref 12.4–15.4)
EOSINOPHIL # BLD: 0.1 K/UL (ref 0–0.6)
EOSINOPHIL NFR BLD: 2 %
EST. AVERAGE GLUCOSE BLD GHB EST-MCNC: 111.2 MG/DL
GFR SERPLBLD CREATININE-BSD FMLA CKD-EPI: 79 ML/MIN/{1.73_M2}
GLUCOSE SERPL-MCNC: 107 MG/DL (ref 70–99)
HBA1C MFR BLD: 5.5 %
HCT VFR BLD AUTO: 34.7 % (ref 40.5–52.5)
HDLC SERPL-MCNC: 66 MG/DL (ref 40–60)
HGB BLD-MCNC: 12 G/DL (ref 13.5–17.5)
LDLC SERPL CALC-MCNC: 110 MG/DL
LYMPHOCYTES # BLD: 1 K/UL (ref 1–5.1)
LYMPHOCYTES NFR BLD: 20 %
MCH RBC QN AUTO: 31.2 PG (ref 26–34)
MCHC RBC AUTO-ENTMCNC: 34.6 G/DL (ref 31–36)
MCV RBC AUTO: 90.2 FL (ref 80–100)
MONOCYTES # BLD: 0.5 K/UL (ref 0–1.3)
MONOCYTES NFR BLD: 10.6 %
NEUTROPHILS # BLD: 3.3 K/UL (ref 1.7–7.7)
NEUTROPHILS NFR BLD: 66.7 %
PLATELET # BLD AUTO: 126 K/UL (ref 135–450)
PMV BLD AUTO: 8.7 FL (ref 5–10.5)
POTASSIUM SERPL-SCNC: 4.4 MMOL/L (ref 3.5–5.1)
PROT SERPL-MCNC: 6.1 G/DL (ref 6.4–8.2)
RBC # BLD AUTO: 3.84 M/UL (ref 4.2–5.9)
SODIUM SERPL-SCNC: 142 MMOL/L (ref 136–145)
TRIGL SERPL-MCNC: 74 MG/DL (ref 0–150)
TROPONIN, HIGH SENSITIVITY: 18 NG/L (ref 0–22)
VLDLC SERPL CALC-MCNC: 15 MG/DL
WBC # BLD AUTO: 5 K/UL (ref 4–11)

## 2024-04-10 PROCEDURE — 97116 GAIT TRAINING THERAPY: CPT

## 2024-04-10 PROCEDURE — 92523 SPEECH SOUND LANG COMPREHEN: CPT

## 2024-04-10 PROCEDURE — 36415 COLL VENOUS BLD VENIPUNCTURE: CPT

## 2024-04-10 PROCEDURE — 97530 THERAPEUTIC ACTIVITIES: CPT

## 2024-04-10 PROCEDURE — 83036 HEMOGLOBIN GLYCOSYLATED A1C: CPT

## 2024-04-10 PROCEDURE — 2580000003 HC RX 258: Performed by: INTERNAL MEDICINE

## 2024-04-10 PROCEDURE — 80061 LIPID PANEL: CPT

## 2024-04-10 PROCEDURE — 92526 ORAL FUNCTION THERAPY: CPT

## 2024-04-10 PROCEDURE — 84484 ASSAY OF TROPONIN QUANT: CPT

## 2024-04-10 PROCEDURE — 6360000002 HC RX W HCPCS: Performed by: INTERNAL MEDICINE

## 2024-04-10 PROCEDURE — 6370000000 HC RX 637 (ALT 250 FOR IP): Performed by: INTERNAL MEDICINE

## 2024-04-10 PROCEDURE — 2060000000 HC ICU INTERMEDIATE R&B

## 2024-04-10 PROCEDURE — 99233 SBSQ HOSP IP/OBS HIGH 50: CPT

## 2024-04-10 PROCEDURE — 85025 COMPLETE CBC W/AUTO DIFF WBC: CPT

## 2024-04-10 PROCEDURE — 92610 EVALUATE SWALLOWING FUNCTION: CPT

## 2024-04-10 PROCEDURE — 6370000000 HC RX 637 (ALT 250 FOR IP): Performed by: NURSE PRACTITIONER

## 2024-04-10 PROCEDURE — 80053 COMPREHEN METABOLIC PANEL: CPT

## 2024-04-10 PROCEDURE — 97535 SELF CARE MNGMENT TRAINING: CPT

## 2024-04-10 PROCEDURE — 97162 PT EVAL MOD COMPLEX 30 MIN: CPT

## 2024-04-10 PROCEDURE — 97165 OT EVAL LOW COMPLEX 30 MIN: CPT

## 2024-04-10 RX ORDER — LORAZEPAM 0.5 MG/1
0.5 TABLET ORAL ONCE
Status: COMPLETED | OUTPATIENT
Start: 2024-04-10 | End: 2024-04-10

## 2024-04-10 RX ADMIN — ATORVASTATIN CALCIUM 80 MG: 80 TABLET, FILM COATED ORAL at 20:53

## 2024-04-10 RX ADMIN — LORAZEPAM 0.5 MG: 0.5 TABLET ORAL at 04:48

## 2024-04-10 RX ADMIN — SODIUM CHLORIDE, PRESERVATIVE FREE 10 ML: 5 INJECTION INTRAVENOUS at 09:49

## 2024-04-10 RX ADMIN — ASPIRIN 81 MG 81 MG: 81 TABLET ORAL at 09:44

## 2024-04-10 RX ADMIN — MELATONIN TAB 3 MG 3 MG: 3 TAB at 20:53

## 2024-04-10 RX ADMIN — ENOXAPARIN SODIUM 40 MG: 100 INJECTION SUBCUTANEOUS at 20:53

## 2024-04-10 ASSESSMENT — PAIN SCALES - GENERAL
PAINLEVEL_OUTOF10: 0
PAINLEVEL_OUTOF10: 0

## 2024-04-11 PROBLEM — I63.231 ARTERIAL ISCHEMIC STROKE, ICA, RIGHT, ACUTE (HCC): Status: ACTIVE | Noted: 2024-04-09

## 2024-04-11 PROBLEM — Z75.3: Status: ACTIVE | Noted: 2024-04-11

## 2024-04-11 LAB
ALBUMIN SERPL-MCNC: 3.6 G/DL (ref 3.4–5)
ALBUMIN/GLOB SERPL: 1.4 {RATIO} (ref 1.1–2.2)
ALP SERPL-CCNC: 60 U/L (ref 40–129)
ALT SERPL-CCNC: 8 U/L (ref 10–40)
ANION GAP SERPL CALCULATED.3IONS-SCNC: 10 MMOL/L (ref 3–16)
AST SERPL-CCNC: 14 U/L (ref 15–37)
BASOPHILS # BLD: 0 K/UL (ref 0–0.2)
BASOPHILS NFR BLD: 0.5 %
BILIRUB SERPL-MCNC: 0.8 MG/DL (ref 0–1)
BUN SERPL-MCNC: 16 MG/DL (ref 7–20)
CALCIUM SERPL-MCNC: 9 MG/DL (ref 8.3–10.6)
CHLORIDE SERPL-SCNC: 105 MMOL/L (ref 99–110)
CO2 SERPL-SCNC: 26 MMOL/L (ref 21–32)
CREAT SERPL-MCNC: 0.9 MG/DL (ref 0.8–1.3)
DEPRECATED RDW RBC AUTO: 14.1 % (ref 12.4–15.4)
EOSINOPHIL # BLD: 0.1 K/UL (ref 0–0.6)
EOSINOPHIL NFR BLD: 2.1 %
GFR SERPLBLD CREATININE-BSD FMLA CKD-EPI: 89 ML/MIN/{1.73_M2}
GLUCOSE SERPL-MCNC: 96 MG/DL (ref 70–99)
HCT VFR BLD AUTO: 35.6 % (ref 40.5–52.5)
HGB BLD-MCNC: 12.3 G/DL (ref 13.5–17.5)
LYMPHOCYTES # BLD: 1.5 K/UL (ref 1–5.1)
LYMPHOCYTES NFR BLD: 27.1 %
MAGNESIUM SERPL-MCNC: 2.2 MG/DL (ref 1.8–2.4)
MCH RBC QN AUTO: 31.3 PG (ref 26–34)
MCHC RBC AUTO-ENTMCNC: 34.6 G/DL (ref 31–36)
MCV RBC AUTO: 90.5 FL (ref 80–100)
MONOCYTES # BLD: 0.6 K/UL (ref 0–1.3)
MONOCYTES NFR BLD: 10.6 %
NEUTROPHILS # BLD: 3.3 K/UL (ref 1.7–7.7)
NEUTROPHILS NFR BLD: 59.7 %
PLATELET # BLD AUTO: 121 K/UL (ref 135–450)
PMV BLD AUTO: 9 FL (ref 5–10.5)
POTASSIUM SERPL-SCNC: 4 MMOL/L (ref 3.5–5.1)
PROT SERPL-MCNC: 6.1 G/DL (ref 6.4–8.2)
RBC # BLD AUTO: 3.93 M/UL (ref 4.2–5.9)
SODIUM SERPL-SCNC: 141 MMOL/L (ref 136–145)
WBC # BLD AUTO: 5.5 K/UL (ref 4–11)

## 2024-04-11 PROCEDURE — 83735 ASSAY OF MAGNESIUM: CPT

## 2024-04-11 PROCEDURE — APPNB30 APP NON BILLABLE TIME 0-30 MINS

## 2024-04-11 PROCEDURE — 36415 COLL VENOUS BLD VENIPUNCTURE: CPT

## 2024-04-11 PROCEDURE — 6370000000 HC RX 637 (ALT 250 FOR IP): Performed by: INTERNAL MEDICINE

## 2024-04-11 PROCEDURE — 6360000002 HC RX W HCPCS: Performed by: INTERNAL MEDICINE

## 2024-04-11 PROCEDURE — 2060000000 HC ICU INTERMEDIATE R&B

## 2024-04-11 PROCEDURE — APPSS30 APP SPLIT SHARED TIME 16-30 MINUTES

## 2024-04-11 PROCEDURE — 92526 ORAL FUNCTION THERAPY: CPT

## 2024-04-11 PROCEDURE — 92507 TX SP LANG VOICE COMM INDIV: CPT

## 2024-04-11 PROCEDURE — 80053 COMPREHEN METABOLIC PANEL: CPT

## 2024-04-11 PROCEDURE — 85025 COMPLETE CBC W/AUTO DIFF WBC: CPT

## 2024-04-11 PROCEDURE — 99232 SBSQ HOSP IP/OBS MODERATE 35: CPT | Performed by: PSYCHIATRY & NEUROLOGY

## 2024-04-11 RX ADMIN — ENOXAPARIN SODIUM 40 MG: 100 INJECTION SUBCUTANEOUS at 20:34

## 2024-04-11 RX ADMIN — MELATONIN TAB 3 MG 3 MG: 3 TAB at 19:57

## 2024-04-11 RX ADMIN — ATORVASTATIN CALCIUM 80 MG: 80 TABLET, FILM COATED ORAL at 19:58

## 2024-04-11 RX ADMIN — ASPIRIN 81 MG 81 MG: 81 TABLET ORAL at 09:06

## 2024-04-11 ASSESSMENT — PAIN SCALES - GENERAL
PAINLEVEL_OUTOF10: 0

## 2024-04-12 ENCOUNTER — TELEPHONE (OUTPATIENT)
Dept: CARDIOLOGY CLINIC | Age: 74
End: 2024-04-12

## 2024-04-12 LAB
ALBUMIN SERPL-MCNC: 3.9 G/DL (ref 3.4–5)
ALBUMIN/GLOB SERPL: 1.3 {RATIO} (ref 1.1–2.2)
ALP SERPL-CCNC: 66 U/L (ref 40–129)
ALT SERPL-CCNC: 10 U/L (ref 10–40)
ANION GAP SERPL CALCULATED.3IONS-SCNC: 10 MMOL/L (ref 3–16)
AST SERPL-CCNC: 16 U/L (ref 15–37)
BASOPHILS # BLD: 0 K/UL (ref 0–0.2)
BASOPHILS NFR BLD: 0.6 %
BILIRUB SERPL-MCNC: 0.6 MG/DL (ref 0–1)
BUN SERPL-MCNC: 21 MG/DL (ref 7–20)
CALCIUM SERPL-MCNC: 9.3 MG/DL (ref 8.3–10.6)
CHLORIDE SERPL-SCNC: 102 MMOL/L (ref 99–110)
CO2 SERPL-SCNC: 28 MMOL/L (ref 21–32)
CREAT SERPL-MCNC: 1 MG/DL (ref 0.8–1.3)
DEPRECATED RDW RBC AUTO: 14.5 % (ref 12.4–15.4)
EOSINOPHIL # BLD: 0.1 K/UL (ref 0–0.6)
EOSINOPHIL NFR BLD: 2.2 %
GFR SERPLBLD CREATININE-BSD FMLA CKD-EPI: 79 ML/MIN/{1.73_M2}
GLUCOSE SERPL-MCNC: 102 MG/DL (ref 70–99)
HCT VFR BLD AUTO: 37.7 % (ref 40.5–52.5)
HGB BLD-MCNC: 12.7 G/DL (ref 13.5–17.5)
LYMPHOCYTES # BLD: 1.6 K/UL (ref 1–5.1)
LYMPHOCYTES NFR BLD: 29.2 %
MCH RBC QN AUTO: 30.2 PG (ref 26–34)
MCHC RBC AUTO-ENTMCNC: 33.6 G/DL (ref 31–36)
MCV RBC AUTO: 90 FL (ref 80–100)
MONOCYTES # BLD: 0.5 K/UL (ref 0–1.3)
MONOCYTES NFR BLD: 9.3 %
NEUTROPHILS # BLD: 3.3 K/UL (ref 1.7–7.7)
NEUTROPHILS NFR BLD: 58.7 %
PLATELET # BLD AUTO: 142 K/UL (ref 135–450)
PMV BLD AUTO: 9.4 FL (ref 5–10.5)
POTASSIUM SERPL-SCNC: 4.4 MMOL/L (ref 3.5–5.1)
PROT SERPL-MCNC: 6.8 G/DL (ref 6.4–8.2)
RBC # BLD AUTO: 4.19 M/UL (ref 4.2–5.9)
SODIUM SERPL-SCNC: 140 MMOL/L (ref 136–145)
WBC # BLD AUTO: 5.5 K/UL (ref 4–11)

## 2024-04-12 PROCEDURE — 80053 COMPREHEN METABOLIC PANEL: CPT

## 2024-04-12 PROCEDURE — 97535 SELF CARE MNGMENT TRAINING: CPT

## 2024-04-12 PROCEDURE — 6370000000 HC RX 637 (ALT 250 FOR IP): Performed by: INTERNAL MEDICINE

## 2024-04-12 PROCEDURE — 97530 THERAPEUTIC ACTIVITIES: CPT

## 2024-04-12 PROCEDURE — 99223 1ST HOSP IP/OBS HIGH 75: CPT | Performed by: INTERNAL MEDICINE

## 2024-04-12 PROCEDURE — 97116 GAIT TRAINING THERAPY: CPT

## 2024-04-12 PROCEDURE — 36415 COLL VENOUS BLD VENIPUNCTURE: CPT

## 2024-04-12 PROCEDURE — 92526 ORAL FUNCTION THERAPY: CPT

## 2024-04-12 PROCEDURE — 2060000000 HC ICU INTERMEDIATE R&B

## 2024-04-12 PROCEDURE — 92507 TX SP LANG VOICE COMM INDIV: CPT

## 2024-04-12 PROCEDURE — 85025 COMPLETE CBC W/AUTO DIFF WBC: CPT

## 2024-04-12 PROCEDURE — 6360000002 HC RX W HCPCS: Performed by: INTERNAL MEDICINE

## 2024-04-12 RX ORDER — METOPROLOL SUCCINATE 50 MG/1
50 TABLET, EXTENDED RELEASE ORAL DAILY
Status: DISCONTINUED | OUTPATIENT
Start: 2024-04-12 | End: 2024-04-14 | Stop reason: HOSPADM

## 2024-04-12 RX ORDER — CETIRIZINE HYDROCHLORIDE 10 MG/1
10 TABLET ORAL DAILY
Status: DISCONTINUED | OUTPATIENT
Start: 2024-04-12 | End: 2024-04-14 | Stop reason: HOSPADM

## 2024-04-12 RX ORDER — EPLERENONE 25 MG/1
12.5 TABLET, FILM COATED ORAL DAILY
Status: DISCONTINUED | OUTPATIENT
Start: 2024-04-12 | End: 2024-04-14 | Stop reason: HOSPADM

## 2024-04-12 RX ORDER — MULTIVITAMIN WITH IRON
1 TABLET ORAL DAILY
Status: DISCONTINUED | OUTPATIENT
Start: 2024-04-12 | End: 2024-04-14 | Stop reason: HOSPADM

## 2024-04-12 RX ORDER — PANTOPRAZOLE SODIUM 40 MG/1
40 TABLET, DELAYED RELEASE ORAL
Status: DISCONTINUED | OUTPATIENT
Start: 2024-04-13 | End: 2024-04-14 | Stop reason: HOSPADM

## 2024-04-12 RX ADMIN — MELATONIN TAB 3 MG 3 MG: 3 TAB at 20:13

## 2024-04-12 RX ADMIN — SACUBITRIL AND VALSARTAN 1 TABLET: 24; 26 TABLET, FILM COATED ORAL at 20:18

## 2024-04-12 RX ADMIN — ENOXAPARIN SODIUM 40 MG: 100 INJECTION SUBCUTANEOUS at 21:14

## 2024-04-12 RX ADMIN — CETIRIZINE HYDROCHLORIDE 10 MG: 10 TABLET, FILM COATED ORAL at 18:23

## 2024-04-12 RX ADMIN — ATORVASTATIN CALCIUM 80 MG: 80 TABLET, FILM COATED ORAL at 20:13

## 2024-04-12 ASSESSMENT — PAIN SCALES - GENERAL
PAINLEVEL_OUTOF10: 0

## 2024-04-12 NOTE — CONSULTS
Maximo Camacho  4/12/2024  [id number]    Chief Complaint: Arterial ischemic stroke, ICA, right, acute (HCC)    Subjective   HPI: 73-year-old gentleman with history of Alzheimer's dementia, history of CVA, hypertension, hyperlipidemia, TBI, GERD who presented with acute confusion and wandering around concerning for worsening dementia .  Workup after admission revealed patient to have a frontal stroke. MRI brain noted for acute infarct within the anterior aspect of the right frontal lobe and cortex of the right pre and postcentral gyri and adjacent right parietal lobe.  Patient seen by neurology, and patient noted to have acute encephalopathy with hospital-acquired delirium.  Patient confused on exam today.  Patient started in therapies, and needs mod assist for transfers and gait of 15 feet.  Patient limited by his dementia with therapies.  Patient lives at home with his spouse in a 1 level house, and was independent in self-care, gait, and ambulation prior to admission..  Patient has [employer] [place] insurance.       Past Medical History:   Diagnosis Date    Allergic rhinitis     Cerebral artery occlusion with cerebral infarction (HCC) 2016    Dementia (HCC)     Hyperlipidemia     Hypertension     Injury brain, traumatic (HCC)     Polyp, stomach        Past Surgical History:   Procedure Laterality Date    COLONOSCOPY      UPPER GASTROINTESTINAL ENDOSCOPY  08/15/2016    UPPER GASTROINTESTINAL ENDOSCOPY N/A 6/1/2021    EGD BIOPSY performed by Keanu Santana MD at [facility] ENDOSCOPY       Social History     Tobacco Use    Smoking status: Never    Smokeless tobacco: Never   Vaping Use    Vaping Use: Never used   Substance Use Topics    Alcohol use: No    Drug use: No          Objective   Objective:  Patient Vitals for the past 24 hrs:   BP Temp Temp src Pulse Resp SpO2 Weight   04/12/24 1114 (!) 167/80 98.2 °F (36.8 °C) Oral 76 -- 95 % --   04/12/24 0824 -- -- -- -- -- -- 63.1 kg (139 lb 1.8 oz)   04/12/24 0700 (!) 
[facility]   Electrophysiology   Date: 4/12/2024  Reason for Consultation: Non-sustained wide complex tachycardia    Consult Requesting Physician: Maricruz Poe MD     Chief Complaint   Patient presents with    Altered Mental Status     Pt brought in by [facility] from home. Pt was found by niece, standing in the laundry room. Pt was supposed to be in bed. EMS states pt is leaning hard to the left when walking. Pt has past hx of strokes. Pt alert but disoriented.        CC: Encephalopathy    HPI: Maximo Camacho is a 73 y.o. male presented to hospital with concern for increased confusion by family. He was found wandering in the laundry room by his niece. EP was consult for an 18 beat run of wide complex tachycardia.     Past medical history of TBI (2017), HTN, CVA, dementia and recurrent fall. Patient has dementia and poor historian     Patient wife is at bedside and provide history.  She states that patient was weak and had several falls.  He was seen by his PCP and was hospitalized at  on 3/7/2024 and discharged on 3/13/2024.  He was diagnosed with cardiomyopathy with ejection fraction of 30 to 35% and had SPECT which was negative for ischemia and was started on guideline directed medical therapy with Entresto, Toprol-XL, eplerenone and Jardiance.  Patient was also discharged with Holter monitoring.  His wife states that they have not returned the Holter monitor yet.    Since admission all his heart failure medication is on hold for unknown reason.    Assessment:   PAT  Non-sustained wide-complex tachycardia  HFrEF  Acute stroke  Generalized weakness    Plan:   Telemetry reviewed.  Patient has had episodes of paroxysmal atrial tachycardia which were nonsustained and short runs.  He also has had short run of wide-complex tachycardia.  Differential diagnosis include SVT with aberrancy versus nonsustained ventricular tachycardia.  Patient appears to be asymptomatic.    He has been 
04/09/2024 02:17 AM    K 4.5 04/09/2024 02:17 AM    K 4.1 10/09/2018 10:16 PM     04/09/2024 02:17 AM    CO2 26 04/09/2024 02:17 AM    BUN 22 04/09/2024 02:17 AM    CREATININE 1.1 04/09/2024 02:17 AM    GFRAA >60 04/30/2021 04:30 AM    LABGLOM 70 04/09/2024 02:17 AM    GLUCOSE 100 04/09/2024 02:17 AM    CALCIUM 8.7 04/09/2024 02:17 AM     Lab Results   Component Value Date/Time    WBC 5.7 04/09/2024 02:17 AM    RBC 3.72 04/09/2024 02:17 AM    HGB 11.6 04/09/2024 02:17 AM    HCT 34.1 04/09/2024 02:17 AM    MCV 91.7 04/09/2024 02:17 AM    RDW 14.8 04/09/2024 02:17 AM     04/09/2024 02:17 AM     Lab Results   Component Value Date    INR 1.12 04/29/2021    PROTIME 13.0 04/29/2021         Impression:  Acute encephalopathy and right ischemic frontal stroke.,  Thromboembolic versus cardioembolic  Advanced dementia of Alzheimer disease  Hx of remote CVA and TBI  Hospital-acquired DM, severe  Hypertension  Hyperlipidemia  Recurrent falling  Cardiomyopathy    Recommendation:  PT and OT  Speech and swallow evaluation  Aspiration precautions  Aspirin and statin  Telemetry  DVT and GI prophylaxis  Can restart home blood pressure medications and avoid low blood pressure  May need placement given advanced dementia, recent stroke and multiple hospitalization  Lengthy discussion with the patient's son regarding outcome from his current stroke and risk of delirium and worsening dementia  A1c and lipid panel  ISS  Metabolic workup  Will follow      Thank you for referring such patient. If you have any questions regarding my consult note, please don't hesitate to call me.     Regulo Manzano MD  [phone]    This dictation was generated by voice recognition computer software. Although all attempts are made to edit the dictation for accuracy, there may be errors in the  transcription that are not intended

## 2024-04-12 NOTE — TELEPHONE ENCOUNTER
Spoke with Mary from  about the monitor, she reported she isn't able to tell if the pt ever wore the monitor, but as of today (04/12/2024) it has not been brought back so there is no report able to be given. I called and spoke with Milady about the information above.

## 2024-04-13 PROCEDURE — 97535 SELF CARE MNGMENT TRAINING: CPT

## 2024-04-13 PROCEDURE — 6370000000 HC RX 637 (ALT 250 FOR IP): Performed by: INTERNAL MEDICINE

## 2024-04-13 PROCEDURE — 2060000000 HC ICU INTERMEDIATE R&B

## 2024-04-13 PROCEDURE — 6360000002 HC RX W HCPCS: Performed by: INTERNAL MEDICINE

## 2024-04-13 PROCEDURE — 97530 THERAPEUTIC ACTIVITIES: CPT

## 2024-04-13 RX ADMIN — PANTOPRAZOLE SODIUM 40 MG: 40 TABLET, DELAYED RELEASE ORAL at 06:27

## 2024-04-13 RX ADMIN — SACUBITRIL AND VALSARTAN 1 TABLET: 24; 26 TABLET, FILM COATED ORAL at 08:06

## 2024-04-13 RX ADMIN — ATORVASTATIN CALCIUM 80 MG: 80 TABLET, FILM COATED ORAL at 20:07

## 2024-04-13 RX ADMIN — EPLERENONE 12.5 MG: 25 TABLET, FILM COATED ORAL at 08:08

## 2024-04-13 RX ADMIN — EMPAGLIFLOZIN 10 MG: 10 TABLET, FILM COATED ORAL at 08:07

## 2024-04-13 RX ADMIN — METOPROLOL SUCCINATE 50 MG: 50 TABLET, EXTENDED RELEASE ORAL at 08:06

## 2024-04-13 RX ADMIN — SACUBITRIL AND VALSARTAN 1 TABLET: 24; 26 TABLET, FILM COATED ORAL at 20:07

## 2024-04-13 RX ADMIN — ENOXAPARIN SODIUM 40 MG: 100 INJECTION SUBCUTANEOUS at 20:07

## 2024-04-13 RX ADMIN — THERA TABS 1 TABLET: TAB at 06:27

## 2024-04-13 RX ADMIN — ASPIRIN 81 MG 81 MG: 81 TABLET ORAL at 08:07

## 2024-04-13 RX ADMIN — CETIRIZINE HYDROCHLORIDE 10 MG: 10 TABLET, FILM COATED ORAL at 08:07

## 2024-04-13 RX ADMIN — MELATONIN TAB 3 MG 3 MG: 3 TAB at 20:07

## 2024-04-14 ENCOUNTER — HOSPITAL ENCOUNTER (INPATIENT)
Age: 74
LOS: 5 days | Discharge: HOME HEALTH | DRG: 058 | End: 2024-04-19
Attending: PHYSICAL MEDICINE & REHABILITATION | Admitting: STUDENT IN AN ORGANIZED HEALTH CARE EDUCATION/TRAINING PROGRAM
Payer: MEDICAID

## 2024-04-14 VITALS
OXYGEN SATURATION: 98 % | RESPIRATION RATE: 18 BRPM | HEIGHT: 72 IN | WEIGHT: 138.8 LBS | HEART RATE: 66 BPM | TEMPERATURE: 98.6 F | SYSTOLIC BLOOD PRESSURE: 149 MMHG | BODY MASS INDEX: 18.8 KG/M2 | DIASTOLIC BLOOD PRESSURE: 81 MMHG

## 2024-04-14 PROBLEM — I63.9 ACUTE ISCHEMIC STROKE (HCC): Status: ACTIVE | Noted: 2024-04-14

## 2024-04-14 PROCEDURE — 6360000002 HC RX W HCPCS: Performed by: PHYSICAL MEDICINE & REHABILITATION

## 2024-04-14 PROCEDURE — 6370000000 HC RX 637 (ALT 250 FOR IP): Performed by: INTERNAL MEDICINE

## 2024-04-14 PROCEDURE — 6370000000 HC RX 637 (ALT 250 FOR IP): Performed by: PHYSICAL MEDICINE & REHABILITATION

## 2024-04-14 PROCEDURE — 1280000000 HC REHAB R&B

## 2024-04-14 RX ORDER — CETIRIZINE HYDROCHLORIDE 10 MG/1
10 TABLET ORAL DAILY
Status: CANCELLED | OUTPATIENT
Start: 2024-04-15

## 2024-04-14 RX ORDER — PANTOPRAZOLE SODIUM 40 MG/1
40 TABLET, DELAYED RELEASE ORAL
Status: CANCELLED | OUTPATIENT
Start: 2024-04-15

## 2024-04-14 RX ORDER — ATORVASTATIN CALCIUM 80 MG/1
80 TABLET, FILM COATED ORAL NIGHTLY
Qty: 30 TABLET | Refills: 3 | Status: ON HOLD | OUTPATIENT
Start: 2024-04-14 | End: 2024-04-19

## 2024-04-14 RX ORDER — PROMETHAZINE HYDROCHLORIDE 25 MG/1
12.5 TABLET ORAL EVERY 6 HOURS PRN
Status: DISCONTINUED | OUTPATIENT
Start: 2024-04-14 | End: 2024-04-19 | Stop reason: HOSPADM

## 2024-04-14 RX ORDER — ASPIRIN 81 MG/1
81 TABLET, CHEWABLE ORAL DAILY
Status: DISCONTINUED | OUTPATIENT
Start: 2024-04-15 | End: 2024-04-19 | Stop reason: HOSPADM

## 2024-04-14 RX ORDER — BISACODYL 10 MG
10 SUPPOSITORY, RECTAL RECTAL DAILY PRN
Status: DISCONTINUED | OUTPATIENT
Start: 2024-04-14 | End: 2024-04-19 | Stop reason: HOSPADM

## 2024-04-14 RX ORDER — BISACODYL 10 MG
10 SUPPOSITORY, RECTAL RECTAL DAILY PRN
Status: CANCELLED | OUTPATIENT
Start: 2024-04-14

## 2024-04-14 RX ORDER — METOPROLOL SUCCINATE 50 MG/1
50 TABLET, EXTENDED RELEASE ORAL DAILY
Status: CANCELLED | OUTPATIENT
Start: 2024-04-15

## 2024-04-14 RX ORDER — ACETAMINOPHEN 325 MG/1
650 TABLET ORAL EVERY 6 HOURS PRN
Status: DISCONTINUED | OUTPATIENT
Start: 2024-04-14 | End: 2024-04-19 | Stop reason: HOSPADM

## 2024-04-14 RX ORDER — ASPIRIN 81 MG/1
81 TABLET, CHEWABLE ORAL DAILY
Status: CANCELLED | OUTPATIENT
Start: 2024-04-15

## 2024-04-14 RX ORDER — ATORVASTATIN CALCIUM 80 MG/1
80 TABLET, FILM COATED ORAL NIGHTLY
Status: CANCELLED | OUTPATIENT
Start: 2024-04-14

## 2024-04-14 RX ORDER — METOPROLOL SUCCINATE 50 MG/1
50 TABLET, EXTENDED RELEASE ORAL DAILY
Status: DISCONTINUED | OUTPATIENT
Start: 2024-04-15 | End: 2024-04-19 | Stop reason: HOSPADM

## 2024-04-14 RX ORDER — LANOLIN ALCOHOL/MO/W.PET/CERES
3 CREAM (GRAM) TOPICAL NIGHTLY
Status: CANCELLED | OUTPATIENT
Start: 2024-04-14

## 2024-04-14 RX ORDER — ATORVASTATIN CALCIUM 80 MG/1
80 TABLET, FILM COATED ORAL NIGHTLY
Status: DISCONTINUED | OUTPATIENT
Start: 2024-04-14 | End: 2024-04-19 | Stop reason: HOSPADM

## 2024-04-14 RX ORDER — EPLERENONE 25 MG/1
12.5 TABLET, FILM COATED ORAL DAILY
Status: CANCELLED | OUTPATIENT
Start: 2024-04-15

## 2024-04-14 RX ORDER — POLYETHYLENE GLYCOL 3350 17 G/17G
17 POWDER, FOR SOLUTION ORAL DAILY PRN
Status: CANCELLED | OUTPATIENT
Start: 2024-04-14

## 2024-04-14 RX ORDER — POLYETHYLENE GLYCOL 3350 17 G/17G
17 POWDER, FOR SOLUTION ORAL DAILY PRN
Status: DISCONTINUED | OUTPATIENT
Start: 2024-04-14 | End: 2024-04-19 | Stop reason: HOSPADM

## 2024-04-14 RX ORDER — ONDANSETRON 2 MG/ML
4 INJECTION INTRAMUSCULAR; INTRAVENOUS EVERY 6 HOURS PRN
Status: CANCELLED | OUTPATIENT
Start: 2024-04-14

## 2024-04-14 RX ORDER — MULTIVITAMIN WITH IRON
1 TABLET ORAL DAILY
Status: CANCELLED | OUTPATIENT
Start: 2024-04-15

## 2024-04-14 RX ORDER — ONDANSETRON 2 MG/ML
4 INJECTION INTRAMUSCULAR; INTRAVENOUS EVERY 6 HOURS PRN
Status: DISCONTINUED | OUTPATIENT
Start: 2024-04-14 | End: 2024-04-19 | Stop reason: HOSPADM

## 2024-04-14 RX ORDER — HYDRALAZINE HYDROCHLORIDE 10 MG/1
10 TABLET, FILM COATED ORAL EVERY 6 HOURS PRN
Status: CANCELLED | OUTPATIENT
Start: 2024-04-14

## 2024-04-14 RX ORDER — LANOLIN ALCOHOL/MO/W.PET/CERES
3 CREAM (GRAM) TOPICAL NIGHTLY
Status: DISCONTINUED | OUTPATIENT
Start: 2024-04-14 | End: 2024-04-19 | Stop reason: HOSPADM

## 2024-04-14 RX ORDER — EPLERENONE 25 MG/1
12.5 TABLET, FILM COATED ORAL DAILY
Status: DISCONTINUED | OUTPATIENT
Start: 2024-04-15 | End: 2024-04-19 | Stop reason: HOSPADM

## 2024-04-14 RX ORDER — PROMETHAZINE HYDROCHLORIDE 25 MG/1
12.5 TABLET ORAL EVERY 6 HOURS PRN
Status: CANCELLED | OUTPATIENT
Start: 2024-04-14

## 2024-04-14 RX ORDER — MULTIVITAMIN WITH IRON
1 TABLET ORAL DAILY
Status: DISCONTINUED | OUTPATIENT
Start: 2024-04-15 | End: 2024-04-19 | Stop reason: HOSPADM

## 2024-04-14 RX ORDER — HYDRALAZINE HYDROCHLORIDE 10 MG/1
10 TABLET, FILM COATED ORAL EVERY 6 HOURS PRN
Status: DISCONTINUED | OUTPATIENT
Start: 2024-04-14 | End: 2024-04-19 | Stop reason: HOSPADM

## 2024-04-14 RX ORDER — CETIRIZINE HYDROCHLORIDE 10 MG/1
10 TABLET ORAL DAILY
Status: DISCONTINUED | OUTPATIENT
Start: 2024-04-15 | End: 2024-04-19 | Stop reason: HOSPADM

## 2024-04-14 RX ORDER — ENOXAPARIN SODIUM 100 MG/ML
40 INJECTION SUBCUTANEOUS NIGHTLY
Status: CANCELLED | OUTPATIENT
Start: 2024-04-14

## 2024-04-14 RX ORDER — ENOXAPARIN SODIUM 100 MG/ML
40 INJECTION SUBCUTANEOUS NIGHTLY
Status: DISCONTINUED | OUTPATIENT
Start: 2024-04-14 | End: 2024-04-19 | Stop reason: HOSPADM

## 2024-04-14 RX ORDER — PANTOPRAZOLE SODIUM 40 MG/1
40 TABLET, DELAYED RELEASE ORAL
Status: DISCONTINUED | OUTPATIENT
Start: 2024-04-15 | End: 2024-04-19 | Stop reason: HOSPADM

## 2024-04-14 RX ORDER — ACETAMINOPHEN 325 MG/1
650 TABLET ORAL EVERY 6 HOURS PRN
Status: CANCELLED | OUTPATIENT
Start: 2024-04-14

## 2024-04-14 RX ADMIN — ATORVASTATIN CALCIUM 80 MG: 80 TABLET, FILM COATED ORAL at 22:53

## 2024-04-14 RX ADMIN — EMPAGLIFLOZIN 10 MG: 10 TABLET, FILM COATED ORAL at 07:55

## 2024-04-14 RX ADMIN — THERA TABS 1 TABLET: TAB at 05:29

## 2024-04-14 RX ADMIN — MELATONIN TAB 3 MG 3 MG: 3 TAB at 22:53

## 2024-04-14 RX ADMIN — PANTOPRAZOLE SODIUM 40 MG: 40 TABLET, DELAYED RELEASE ORAL at 05:29

## 2024-04-14 RX ADMIN — CETIRIZINE HYDROCHLORIDE 10 MG: 10 TABLET, FILM COATED ORAL at 07:55

## 2024-04-14 RX ADMIN — ENOXAPARIN SODIUM 40 MG: 100 INJECTION SUBCUTANEOUS at 22:53

## 2024-04-14 RX ADMIN — EPLERENONE 12.5 MG: 25 TABLET, FILM COATED ORAL at 07:55

## 2024-04-14 RX ADMIN — SACUBITRIL AND VALSARTAN 1 TABLET: 24; 26 TABLET, FILM COATED ORAL at 07:57

## 2024-04-14 RX ADMIN — ASPIRIN 81 MG 81 MG: 81 TABLET ORAL at 07:55

## 2024-04-14 RX ADMIN — SACUBITRIL AND VALSARTAN 1 TABLET: 24; 26 TABLET, FILM COATED ORAL at 22:53

## 2024-04-14 RX ADMIN — METOPROLOL SUCCINATE 50 MG: 50 TABLET, EXTENDED RELEASE ORAL at 07:55

## 2024-04-14 ASSESSMENT — PAIN SCALES - GENERAL
PAINLEVEL_OUTOF10: 0
PAINLEVEL_OUTOF10: 0

## 2024-04-14 ASSESSMENT — PAIN SCALES - WONG BAKER: WONGBAKER_NUMERICALRESPONSE: NO HURT

## 2024-04-14 NOTE — H&P
Hospital Medicine History & Physical      PCP: System, Referring Not In (Inactive)    Date of Admission: 4/9/2024    Date of Service: Pt seen/examined on 4/9/2024 and Admitted to Inpatient with expected LOS greater than two midnights due to medical therapy.     Chief Complaint:    Chief Complaint   Patient presents with    Altered Mental Status     Pt brought in by [facility] from home. Pt was found by niece, standing in the laundry room. Pt was supposed to be in bed. EMS states pt is leaning hard to the left when walking. Pt has past hx of strokes. Pt alert but disoriented.          History Of Present Illness:    The patient is a 73 y.o. male with history of Alzheimer's dementia, history of CVA, hypertension, hyperlipidemia, history of TBI, GERD who was presented with concern for acute confusion found wandering in the house purposelessly.  Patient was disoriented though alert.  According to the wife at bedside at the time of evaluation, patient has been wandering in the house a lot lately and was recently admitted to  for similar presentation.  No cough or production, no fevers or chills.  No infective symptoms.  Chest x-ray with clear lung fields.  Urinalysis negative.  CT brain with CT angiogram of the head and neck noted for no acute pathology    Past Medical History:        Diagnosis Date    Allergic rhinitis     Cerebral artery occlusion with cerebral infarction (HCC) 2016    Dementia (HCC)     Hyperlipidemia     Hypertension     Injury brain, traumatic (HCC)     Polyp, stomach        Past Surgical History:        Procedure Laterality Date    COLONOSCOPY      UPPER GASTROINTESTINAL ENDOSCOPY  08/15/2016    UPPER GASTROINTESTINAL ENDOSCOPY N/A 6/1/2021    EGD BIOPSY performed by Keanu Santana MD at [facility] ENDOSCOPY       Medications Prior to Admission:    Prior to Admission medications    Medication Sig Start Date End Date Taking? Authorizing Provider   Probiotic Product (ALIGN PO) Take by mouth daily 
 Ran Out of Food in the Last Year: Never true   Transportation Needs: No Transportation Needs (4/9/2024)    PRAPARE - Transportation     Lack of Transportation (Medical): No     Lack of Transportation (Non-Medical): No   Housing Stability: Low Risk  (4/9/2024)    Housing Stability Vital Sign     Unable to Pay for Housing in the Last Year: No     Number of Places Lived in the Last Year: 1     Unstable Housing in the Last Year: No       Allergies   Allergen Reactions    Pork-Derived Products          Current Facility-Administered Medications   Medication Dose Route Frequency Provider Last Rate Last Admin    eplerenone (INSPRA) tablet 12.5 mg  12.5 mg Oral Daily Maricruz Poe MD   12.5 mg at 04/14/24 0755    metoprolol succinate (TOPROL XL) extended release tablet 50 mg  50 mg Oral Daily Maricruz Poe MD   50 mg at 04/14/24 0755    pantoprazole (PROTONIX) tablet 40 mg  40 mg Oral QAM AC Maricruz Poe MD   40 mg at 04/14/24 0529    sacubitril-valsartan (ENTRESTO) 24-26 MG per tablet 1 tablet  1 tablet Oral BID Maricruz Poe MD   1 tablet at 04/14/24 0757    multivitamin 1 tablet  1 tablet Oral Daily Maricruz Poe MD   1 tablet at 04/14/24 0529    empagliflozin (JARDIANCE) tablet 10 mg  10 mg Oral Daily Maricruz Poe MD   10 mg at 04/14/24 0755    cetirizine (ZYRTEC) tablet 10 mg  10 mg Oral Daily Maricruz Poe MD   10 mg at 04/14/24 0755    potassium chloride (KLOR-CON M) extended release tablet 40 mEq  40 mEq Oral PRN Tiffany, Ahmad A, DO        Or    potassium bicarb-citric acid (EFFER-K) effervescent tablet 40 mEq  40 mEq Oral PRN Tiffany, Ahmad A, DO        Or    potassium chloride 10 mEq/100 mL IVPB (Peripheral Line)  10 mEq IntraVENous PRN Tiffany, Ahmad A, DO        promethazine (PHENERGAN) tablet 12.5 mg  12.5 mg Oral Q6H PRN Tiffany, Ahmad A, DO        Or    ondansetron (ZOFRAN) injection 4 mg  4 mg IntraVENous Q6H PRN Sage Allen DO        melatonin tablet 3 mg 
No

## 2024-04-14 NOTE — DISCHARGE INSTR - COC
Continuity of Care Form    Patient Name: Maximo Camacho   :  1950  MRN:  [id number]    Admit date:  2024  Discharge date:  ***    Code Status Order: Full Code   Advance Directives:     Admitting Physician:  Sage Allen DO  PCP: System, Referring Not In (Inactive)    Discharging Nurse: ***  Discharging Hospital Unit/Room#: 3TN-3360/3360-01  Discharging Unit Phone Number: ***    Emergency Contact:   Extended Emergency Contact Information  Primary Emergency Contact: Melissa Camacho  Address: [address] [place] [address] [facility]  Home Phone: [phone]  Relation: Spouse  Secondary Emergency Contact: Eagle Mckeon  Home Phone: [phone]  Relation: Child    Past Surgical History:  Past Surgical History:   Procedure Laterality Date    COLONOSCOPY      UPPER GASTROINTESTINAL ENDOSCOPY  08/15/2016    UPPER GASTROINTESTINAL ENDOSCOPY N/A 2021    EGD BIOPSY performed by Keanu Santana MD at [facility] ENDOSCOPY       Immunization History:   Immunization History   Administered Date(s) Administered    COVID-19, MODERNA Bivalent, (age 12y+), IM, 50 mcg/0.5 mL 2022    Influenza Virus Vaccine 10/23/2014    Pneumococcal, PPSV23, PNEUMOVAX 23, (age 2y+), SC/IM, 0.5mL 10/23/2014       Active Problems:  Patient Active Problem List   Diagnosis Code    Remote history of stroke Z86.73    TBI (traumatic brain injury) ([facility]) S06.9XAA    Partial deafness H91.90    Cerebral infarction (HCC) I63.9    Dilated aortic root ([facility]) I77.810    UGIB (upper gastrointestinal bleed) K92.2    Anemia associated with acute blood loss D62    Allergic rhinitis J30.9    Chronic abdominal pain R10.9, G89.29    Encephalopathy, metabolic G93.41    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

## 2024-04-14 NOTE — CARE COORDINATION
Case Management -  Discharge Note      Patient Name: Maximo Camacho                   YOB: 1950            Readmission Risk (Low < 19, Mod (19-27), High > 27): Readmission Risk Score: 12.4    Current PCP: System, Referring Not In (Inactive)      PT AM-PAC: 17 /24  OT AM-PAC: 17 /24    Patient/patient representative has been educated on the benefits of ARU as well as the possible risks of declining recommended services. Patient/patient representative has acknowledged the information provided and decided on the following discharge plan. Patient/ patient representative has been provided freedom of choice regarding service provider, supported by basic dialogue that supports the patient's individualized plan of care/goals.    [facility] Acute [facility]  [address] [place] [address]  Phone: [phone]  Fax:      Financial    Payor: [employer] [place]ICA[place] / Plan: [place] / Product Type: *No Product type* /     Pharmacy:  Potential assistance Purchasing Medications: No  Meds-to-Beds request:        [facility] [id number] - [place], [place] - [address] - P [phone] - F [phone]  [address]  [facility] [address]  Phone: [phone] Fax: [phone]      Electronically signed by Renetta Vazquez RN on 4/14/2024 at 2:13 PM        
Discharge Planning Note:    GUILLE received call from Yefri with ARU informing that pre-cert has been approved and asking if patient is ready for DC to ARU today. GUILLE sent PS message to Dr. Poe, awaiting update.     Yefri can be called at [phone].     Electronically signed by Renetta Vazquez RN on 4/14/2024 at 9:34 AM    Anticipate DC to ARU later today per Dr. Poe. Yefri from ARU admissions updated.     Electronically signed by Renetta Vazquez RN on 4/14/2024 at 10:04 AM    
Prior Authorization submitted for ARU approval with a reference number: [id number]     ARU will continue to follow progress and update discharge plan as needed.    MARIETTA CoradoN, .[phone]    
the following treatment goals of Acute encephalopathy [G93.40]  Frequent falls [R29.6]    IF APPLICABLE: The Patient and/or patient representative Maximo and his family were provided with a choice of provider and agrees with the discharge plan. Freedom of choice list with basic dialogue that supports the patient's individualized plan of care/goals and shares the quality data associated with the providers was provided to:     Patient Representative Name:       The Patient and/or Patient Representative Agree with the Discharge Plan?      DANIA GARCÍA  Case Management Department  Ph: [phone] Fax: [phone]

## 2024-04-14 NOTE — PLAN OF CARE
Problem: Safety - Adult  Goal: Free from fall injury  4/10/2024 1008 by Cayetano Maguire, RN  Outcome: Progressing  Note: Pt remains free from falls. Safety precautions in place. Bed in lowest position, bed wheels locked, call light with in reach, bed alarm on, yellow blanket in place, fall risk wrist band on, SAFE outside of doorway. Will continue to monitor.  4/9/2024 2340 by Jennifer Kilpatrick, RN  Outcome: Progressing     
  Problem: Skin/Tissue Integrity  Goal: Absence of new skin breakdown  Description: 1.  Monitor for areas of redness and/or skin breakdown  2.  Assess vascular access sites hourly  3.  Every 4-6 hours minimum:  Change oxygen saturation probe site  4.  Every 4-6 hours:  If on nasal continuous positive airway pressure, respiratory therapy assess nares and determine need for appliance change or resting period.  4/10/2024 2352 by Kinga Vigil RN  Outcome: Progressing  4/10/2024 1008 by Cayetano Maguire RN  Outcome: Progressing     Problem: Discharge Planning  Goal: Discharge to home or other facility with appropriate resources  4/10/2024 2352 by Kinga Vigil RN  Outcome: Progressing     Problem: Pain  Goal: Verbalizes/displays adequate comfort level or baseline comfort level  4/10/2024 2352 by Kinga Vigil RN  Outcome: Progressing     Problem: Confusion  Goal: Confusion, delirium, dementia, or psychosis is improved or at baseline  Description: INTERVENTIONS:  1. Assess for possible contributors to thought disturbance, including medications, impaired vision or hearing, underlying metabolic abnormalities, dehydration, psychiatric diagnoses, and notify attending LIP  2. [place] high risk fall precautions, as indicated  3. Provide frequent short contacts to provide reality reorientation, refocusing and direction  4. Decrease environmental stimuli, including noise as appropriate  5. Monitor and intervene to maintain adequate nutrition, hydration, elimination, sleep and activity  6. If unable to ensure safety without constant attention obtain sitter and review sitter guidelines with assigned personnel  7. Initiate Psychosocial CNS and Spiritual Care consult, as indicated  4/10/2024 2352 by Kinga Vigil RN  Outcome: Progressing     Problem: Safety - Adult  Goal: Free from fall injury  4/10/2024 2352 by Kinga Vigil RN  Outcome: Progressing     
  Problem: Skin/Tissue Integrity  Goal: Absence of new skin breakdown  Description: 1.  Monitor for areas of redness and/or skin breakdown  2.  Assess vascular access sites hourly  3.  Every 4-6 hours minimum:  Change oxygen saturation probe site  4.  Every 4-6 hours:  If on nasal continuous positive airway pressure, respiratory therapy assess nares and determine need for appliance change or resting period.  4/11/2024 0916 by Susannah Bazan RN  Outcome: Progressing  4/10/2024 2352 by Kinga Vigil RN  Outcome: Progressing     Problem: Discharge Planning  Goal: Discharge to home or other facility with appropriate resources  4/11/2024 0916 by Susannah Bazan RN  Outcome: Progressing  Flowsheets (Taken 4/11/2024 0910)  Discharge to home or other facility with appropriate resources: Identify barriers to discharge with patient and caregiver  4/10/2024 2352 by Kinga Vigil RN  Outcome: Progressing     Problem: Pain  Goal: Verbalizes/displays adequate comfort level or baseline comfort level  4/11/2024 0916 by Susannah Bazan RN  Outcome: Progressing  4/10/2024 2352 by Kinga Vigil RN  Outcome: Progressing     Problem: Confusion  Goal: Confusion, delirium, dementia, or psychosis is improved or at baseline  Description: INTERVENTIONS:  1. Assess for possible contributors to thought disturbance, including medications, impaired vision or hearing, underlying metabolic abnormalities, dehydration, psychiatric diagnoses, and notify attending LIP  2. [place] high risk fall precautions, as indicated  3. Provide frequent short contacts to provide reality reorientation, refocusing and direction  4. Decrease environmental stimuli, including noise as appropriate  5. Monitor and intervene to maintain adequate nutrition, hydration, elimination, sleep and activity  6. If unable to ensure safety without constant attention obtain sitter and review sitter guidelines with assigned personnel  7. Initiate Psychosocial CNS and 
  Problem: Skin/Tissue Integrity  Goal: Absence of new skin breakdown  Description: 1.  Monitor for areas of redness and/or skin breakdown  2.  Assess vascular access sites hourly  3.  Every 4-6 hours minimum:  Change oxygen saturation probe site  4.  Every 4-6 hours:  If on nasal continuous positive airway pressure, respiratory therapy assess nares and determine need for appliance change or resting period.  4/11/2024 2048 by Kinga Vigil RN  Outcome: Progressing  Problem: Pain  Goal: Verbalizes/displays adequate comfort level or baseline comfort level  4/11/2024 2048 by Kinga Vigil RN  Outcome: Progressing     Problem: Confusion  Goal: Confusion, delirium, dementia, or psychosis is improved or at baseline  Description: INTERVENTIONS:  1. Assess for possible contributors to thought disturbance, including medications, impaired vision or hearing, underlying metabolic abnormalities, dehydration, psychiatric diagnoses, and notify attending LIP  2. [place] high risk fall precautions, as indicated  3. Provide frequent short contacts to provide reality reorientation, refocusing and direction  4. Decrease environmental stimuli, including noise as appropriate  5. Monitor and intervene to maintain adequate nutrition, hydration, elimination, sleep and activity  6. If unable to ensure safety without constant attention obtain sitter and review sitter guidelines with assigned personnel  7. Initiate Psychosocial CNS and Spiritual Care consult, as indicated  4/11/2024 2048 by Kinga Vigil RN  Outcome: Progressing     Problem: Safety - Adult  Goal: Free from fall injury  4/11/2024 2048 by Kinga Vigil RN  Outcome: Progressing        Problem: Discharge Planning  Goal: Discharge to home or other facility with appropriate resources  4/11/2024 2048 by Kinga Vigil RN  Outcome: Progressing     
  Problem: Skin/Tissue Integrity  Goal: Absence of new skin breakdown  Description: 1.  Monitor for areas of redness and/or skin breakdown  2.  Assess vascular access sites hourly  3.  Every 4-6 hours minimum:  Change oxygen saturation probe site  4.  Every 4-6 hours:  If on nasal continuous positive airway pressure, respiratory therapy assess nares and determine need for appliance change or resting period.  4/12/2024 0812 by Susannah Bazan RN  Outcome: Adequate for Discharge  4/11/2024 2048 by Kinga Vigil RN  Outcome: Progressing     Problem: Discharge Planning  Goal: Discharge to home or other facility with appropriate resources  4/12/2024 0812 by Susannah Bazan RN  Outcome: Adequate for Discharge  Flowsheets (Taken 4/12/2024 0808)  Discharge to home or other facility with appropriate resources: Identify barriers to discharge with patient and caregiver  4/11/2024 2048 by Kinga Vigil RN  Outcome: Progressing     Problem: Pain  Goal: Verbalizes/displays adequate comfort level or baseline comfort level  4/12/2024 0812 by Susannah Bazan RN  Outcome: Adequate for Discharge  Flowsheets (Taken 4/12/2024 0700)  Verbalizes/displays adequate comfort level or baseline comfort level: Assess pain using appropriate pain scale  4/11/2024 2048 by Kinga Vigil RN  Outcome: Progressing     Problem: Confusion  Goal: Confusion, delirium, dementia, or psychosis is improved or at baseline  Description: INTERVENTIONS:  1. Assess for possible contributors to thought disturbance, including medications, impaired vision or hearing, underlying metabolic abnormalities, dehydration, psychiatric diagnoses, and notify attending LIP  2. [place] high risk fall precautions, as indicated  3. Provide frequent short contacts to provide reality reorientation, refocusing and direction  4. Decrease environmental stimuli, including noise as appropriate  5. Monitor and intervene to maintain adequate nutrition, hydration, elimination, 
  Problem: Skin/Tissue Integrity  Goal: Absence of new skin breakdown  Description: 1.  Monitor for areas of redness and/or skin breakdown  2.  Assess vascular access sites hourly  3.  Every 4-6 hours minimum:  Change oxygen saturation probe site  4.  Every 4-6 hours:  If on nasal continuous positive airway pressure, respiratory therapy assess nares and determine need for appliance change or resting period.  Outcome: Progressing     Problem: Discharge Planning  Goal: Discharge to home or other facility with appropriate resources  Outcome: Progressing  Flowsheets (Taken 4/14/2024 0745)  Discharge to home or other facility with appropriate resources: Identify barriers to discharge with patient and caregiver     Problem: Pain  Goal: Verbalizes/displays adequate comfort level or baseline comfort level  Outcome: Progressing     Problem: Confusion  Goal: Confusion, delirium, dementia, or psychosis is improved or at baseline  Description: INTERVENTIONS:  1. Assess for possible contributors to thought disturbance, including medications, impaired vision or hearing, underlying metabolic abnormalities, dehydration, psychiatric diagnoses, and notify attending LIP  2. [place] high risk fall precautions, as indicated  3. Provide frequent short contacts to provide reality reorientation, refocusing and direction  4. Decrease environmental stimuli, including noise as appropriate  5. Monitor and intervene to maintain adequate nutrition, hydration, elimination, sleep and activity  6. If unable to ensure safety without constant attention obtain sitter and review sitter guidelines with assigned personnel  7. Initiate Psychosocial CNS and Spiritual Care consult, as indicated  Outcome: Progressing  Flowsheets (Taken 4/14/2024 0745)  Effect of thought disturbance (confusion, delirium, dementia, or psychosis) are managed with adequate functional status:   [place] high risk fall precautions, as indicated   Assess for contributors to 
  Problem: Skin/Tissue Integrity  Goal: Absence of new skin breakdown  Description: 1.  Monitor for areas of redness and/or skin breakdown  2.  Assess vascular access sites hourly  3.  Every 4-6 hours minimum:  Change oxygen saturation probe site  4.  Every 4-6 hours:  If on nasal continuous positive airway pressure, respiratory therapy assess nares and determine need for appliance change or resting period.  Outcome: Progressing     Problem: Discharge Planning  Goal: Discharge to home or other facility with appropriate resources  Outcome: Progressing  Flowsheets (Taken 4/9/2024 1901)  Discharge to home or other facility with appropriate resources:   Identify barriers to discharge with patient and caregiver   Arrange for needed discharge resources and transportation as appropriate   Identify discharge learning needs (meds, wound care, etc)   Arrange for interpreters to assist at discharge as needed   Refer to discharge planning if patient needs post-hospital services based on physician order or complex needs related to functional status, cognitive ability or social support system     Problem: Pain  Goal: Verbalizes/displays adequate comfort level or baseline comfort level  Outcome: Progressing     Problem: Confusion  Goal: Confusion, delirium, dementia, or psychosis is improved or at baseline  Description: INTERVENTIONS:  1. Assess for possible contributors to thought disturbance, including medications, impaired vision or hearing, underlying metabolic abnormalities, dehydration, psychiatric diagnoses, and notify attending LIP  2. [place] high risk fall precautions, as indicated  3. Provide frequent short contacts to provide reality reorientation, refocusing and direction  4. Decrease environmental stimuli, including noise as appropriate  5. Monitor and intervene to maintain adequate nutrition, hydration, elimination, sleep and activity  6. If unable to ensure safety without constant attention obtain sitter and 
vision or hearing, underlying metabolic abnormalities, dehydration, psychiatric diagnoses, notify [facility] high risk fall precautions, as indicated   Provide frequent short contacts to provide reality reorientation, refocusing and direction   Decrease environmental stimuli, including noise as appropriate   Monitor and intervene to maintain adequate nutrition, hydration, elimination, sleep and activity     Problem: Safety - Adult  Goal: Free from fall injury  Outcome: Progressing     Problem: Neurosensory - Adult  Goal: Achieves stable or improved neurological status  Outcome: Progressing  Flowsheets (Taken 4/13/2024 0800)  Achieves stable or improved neurological status:   Assess for and report changes in neurological status   Initiate measures to prevent increased intracranial pressure   Maintain blood pressure and fluid volume within ordered parameters to optimize cerebral perfusion and minimize risk of hemorrhage   Monitor temperature, glucose, and sodium. Initiate appropriate interventions as ordered     Problem: Musculoskeletal - Adult  Goal: Return mobility to safest level of function  Outcome: Progressing  Flowsheets (Taken 4/13/2024 0800)  Return Mobility to Safest Level of Function:   Assess patient stability and activity tolerance for standing, transferring and ambulating with or without assistive devices   Assist with transfers and ambulation using safe patient handling equipment as needed   Instruct patient/family in ordered activity level   Obtain physical therapy/occupational therapy consults as needed  Goal: Maintain proper alignment of affected body part  Outcome: Progressing  Flowsheets (Taken 4/13/2024 0800)  Maintain proper alignment of affected body part:   Support and protect limb and body alignment per provider's orders   Instruct and reinforce with patient and family use of appropriate assistive device and precautions (e.g. spinal or hip dislocation precautions)  Goal: Return ADL status

## 2024-04-14 NOTE — DISCHARGE SUMMARY
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    Hospital Course:   The patient is a 73-year-old gentleman with a history of Alzheimer's dementia, history of CVA, hypertension, hyperlipidemia, TBI, GERD who presented with acute confusion and wandering around concerning for worsening dementia was found to have a frontal stroke  MRI brain noted for acute infarct within the anterior aspect of the right frontal lobe and cortex of the right pre and postcentral gyri and adjacent right parietal lobe  Admission course complicated by nonsustained VT for his she was evaluated by cardiology with no further workup recommended at this time      Nonsustained VT: Patient was seen by EP in consult and reviewed telemetry noted paroxysmal atrial tachycardia which were nonsustained and short runs.  He also is reported to have had a short run of wide-complex tachycardia.  He is asymptomatic.  Patient had been discharged on Holter monitor from  and results not available.  Family has not returned the Holter monitor yet.  They were recommended to send the Holter monitor back for further analysis.  He was recently at the  and echocardiogram at that time noted with a EF of 30 to 35%.  Stress test at  3/11/2024 with no

## 2024-04-15 LAB
ANION GAP SERPL CALCULATED.3IONS-SCNC: 13 MMOL/L (ref 3–16)
BASOPHILS # BLD: 0.1 K/UL (ref 0–0.2)
BASOPHILS NFR BLD: 1.5 %
BUN SERPL-MCNC: 27 MG/DL (ref 7–20)
CALCIUM SERPL-MCNC: 9.6 MG/DL (ref 8.3–10.6)
CHLORIDE SERPL-SCNC: 99 MMOL/L (ref 99–110)
CO2 SERPL-SCNC: 25 MMOL/L (ref 21–32)
CREAT SERPL-MCNC: 1.3 MG/DL (ref 0.8–1.3)
DEPRECATED RDW RBC AUTO: 14.5 % (ref 12.4–15.4)
EOSINOPHIL # BLD: 0.1 K/UL (ref 0–0.6)
EOSINOPHIL NFR BLD: 1.3 %
GFR SERPLBLD CREATININE-BSD FMLA CKD-EPI: 57 ML/MIN/{1.73_M2}
GLUCOSE SERPL-MCNC: 91 MG/DL (ref 70–99)
HCT VFR BLD AUTO: 41.4 % (ref 40.5–52.5)
HGB BLD-MCNC: 14.1 G/DL (ref 13.5–17.5)
LYMPHOCYTES # BLD: 1.6 K/UL (ref 1–5.1)
LYMPHOCYTES NFR BLD: 30.6 %
MCH RBC QN AUTO: 31.2 PG (ref 26–34)
MCHC RBC AUTO-ENTMCNC: 34.2 G/DL (ref 31–36)
MCV RBC AUTO: 91.1 FL (ref 80–100)
MONOCYTES # BLD: 0.6 K/UL (ref 0–1.3)
MONOCYTES NFR BLD: 11.6 %
NEUTROPHILS # BLD: 2.9 K/UL (ref 1.7–7.7)
NEUTROPHILS NFR BLD: 55 %
PLATELET # BLD AUTO: 176 K/UL (ref 135–450)
PMV BLD AUTO: 8.6 FL (ref 5–10.5)
POTASSIUM SERPL-SCNC: 4.6 MMOL/L (ref 3.5–5.1)
PREALB SERPL-MCNC: 25.6 MG/DL (ref 20–40)
RBC # BLD AUTO: 4.54 M/UL (ref 4.2–5.9)
SODIUM SERPL-SCNC: 137 MMOL/L (ref 136–145)
WBC # BLD AUTO: 5.3 K/UL (ref 4–11)

## 2024-04-15 PROCEDURE — 36415 COLL VENOUS BLD VENIPUNCTURE: CPT

## 2024-04-15 PROCEDURE — 97530 THERAPEUTIC ACTIVITIES: CPT

## 2024-04-15 PROCEDURE — 92610 EVALUATE SWALLOWING FUNCTION: CPT

## 2024-04-15 PROCEDURE — 84134 ASSAY OF PREALBUMIN: CPT

## 2024-04-15 PROCEDURE — 92523 SPEECH SOUND LANG COMPREHEN: CPT

## 2024-04-15 PROCEDURE — 85025 COMPLETE CBC W/AUTO DIFF WBC: CPT

## 2024-04-15 PROCEDURE — 97161 PT EVAL LOW COMPLEX 20 MIN: CPT

## 2024-04-15 PROCEDURE — 97166 OT EVAL MOD COMPLEX 45 MIN: CPT

## 2024-04-15 PROCEDURE — 97535 SELF CARE MNGMENT TRAINING: CPT

## 2024-04-15 PROCEDURE — 6360000002 HC RX W HCPCS: Performed by: PHYSICAL MEDICINE & REHABILITATION

## 2024-04-15 PROCEDURE — 80048 BASIC METABOLIC PNL TOTAL CA: CPT

## 2024-04-15 PROCEDURE — 94760 N-INVAS EAR/PLS OXIMETRY 1: CPT

## 2024-04-15 PROCEDURE — 1280000000 HC REHAB R&B

## 2024-04-15 PROCEDURE — 6370000000 HC RX 637 (ALT 250 FOR IP): Performed by: PHYSICAL MEDICINE & REHABILITATION

## 2024-04-15 RX ORDER — QUETIAPINE FUMARATE 25 MG/1
25 TABLET, FILM COATED ORAL NIGHTLY PRN
Status: DISCONTINUED | OUTPATIENT
Start: 2024-04-15 | End: 2024-04-19 | Stop reason: HOSPADM

## 2024-04-15 RX ADMIN — ENOXAPARIN SODIUM 40 MG: 100 INJECTION SUBCUTANEOUS at 21:26

## 2024-04-15 RX ADMIN — ATORVASTATIN CALCIUM 80 MG: 80 TABLET, FILM COATED ORAL at 21:27

## 2024-04-15 RX ADMIN — METOPROLOL SUCCINATE 50 MG: 50 TABLET, EXTENDED RELEASE ORAL at 10:13

## 2024-04-15 RX ADMIN — SACUBITRIL AND VALSARTAN 1 TABLET: 24; 26 TABLET, FILM COATED ORAL at 21:26

## 2024-04-15 RX ADMIN — EMPAGLIFLOZIN 10 MG: 10 TABLET, FILM COATED ORAL at 10:13

## 2024-04-15 RX ADMIN — ASPIRIN 81 MG 81 MG: 81 TABLET ORAL at 10:13

## 2024-04-15 RX ADMIN — EPLERENONE 12.5 MG: 25 TABLET, FILM COATED ORAL at 10:14

## 2024-04-15 RX ADMIN — CETIRIZINE HYDROCHLORIDE 10 MG: 10 TABLET, FILM COATED ORAL at 10:14

## 2024-04-15 RX ADMIN — MELATONIN TAB 3 MG 3 MG: 3 TAB at 21:27

## 2024-04-15 RX ADMIN — THERA TABS 1 TABLET: TAB at 06:42

## 2024-04-15 RX ADMIN — PANTOPRAZOLE SODIUM 40 MG: 40 TABLET, DELAYED RELEASE ORAL at 06:42

## 2024-04-15 ASSESSMENT — PAIN SCALES - PAIN ASSESSMENT IN ADVANCED DEMENTIA (PAINAD)
FACIALEXPRESSION: SMILING OR INEXPRESSIVE
CONSOLABILITY: NO NEED TO CONSOLE
BREATHING: NORMAL
BODYLANGUAGE: RELAXED
TOTALSCORE: 0

## 2024-04-15 ASSESSMENT — PAIN SCALES - GENERAL: PAINLEVEL_OUTOF10: 0

## 2024-04-15 NOTE — CARE COORDINATION
Social Work Admission Assessment    Objective:  Met with wife to complete initial assessment and review role of  in rehab process.      Current Home Situation:  Patient lives a home with his wife. That is one level. The patient wife reports their son is supportive. The patient's wife reports she and her son are the patient's primary caregiver.     Pt's plans are: Retired      Accessibility to community resources/transportation: The patient's wife states the patient is not active with any home health care agencies but she is currently in contact with [employer] trying to get additional assistance in the home.       Has pt experienced a recent loss or signigicant life event that would impact their care or ability to participate?  Denied    Has pt ever been treated for emotional disorders?  No    How does pt and family cope with stressful events and this hospitalization?  None    Special Problem Areas: None     Discharge Plan:TBD. The patient's wife  states she has been in contact with [employer] and trying to get more assistance in the home such as aide services.     Impression/Plan: Maximo Caamcho is a 73 male that has been admitted to ARU. Provided patient with this SW's card to contact as needed. Will continue to follow for support and discharge planning.        Electronically signed by BURTON De Leon on 4/15/2024 at 2:57 PM

## 2024-04-15 NOTE — CONSULTS
Nutrition Note    RECOMMENDATIONS  PO Diet: continue dysphagia soft & bite-sized  ONS: Ensure Plus High Protein increase to TID      ASSESSMENT   Consult received for new admission to ARU.  Pt on a dsyphagia soft & bite-sized diet with oral nutrition supplements ordered BID.  Prior to admission to ARU, pt was eating % of meals meals.  Will continue to provide Ensure Plus High Protein BID.  Question wt status- pt admitted to hospital with wt of 142 lb (bed scale).  On admission to ARU 4/14 two weights were recorded- 138.6 lb (standing scale) and 128.5 lb (bed scale).  One 4/15 128 lb (bed scale) was recorded.  Pt unable to state UBW, will follow for next weight and evaluate for changes.       Malnutrition Status: Mild malnutrition  Chronic Illness  Findings of the 6 clinical characteristics of malnutrition:  Energy Intake:  No significant decrease in energy intake  Weight Loss:  Unable to assess     Body Fat Loss:  Mild body fat loss Orbital, Buccal region   Muscle Mass Loss:  Mild muscle mass loss Temples (temporalis), Clavicles (pectoralis & deltoids)  Fluid Accumulation:  No significant fluid accumulation     Strength:  Not Performed      NUTRITION DIAGNOSIS   Increased nutrient needs related to increase demand for energy/nutrients as evidenced by other (comment) (in ARU for strength and conditiioning)    Goals: PO intake 75% or greater, prior to discharge     NUTRITION RELATED FINDINGS  Objective: oriented to person only; 4/13 LBM; oriented to person only  Wounds: None    CURRENT NUTRITION THERAPIES  ADULT DIET; Dysphagia - Soft and Bite Sized  ADULT ORAL NUTRITION SUPPLEMENT; Lunch, Dinner; Standard High Calorie/High Protein Oral Supplement     PO Intake: 51-75%, %   PO Supplement Intake:%        ANTHROPOMETRICS  Current Height: 182.9 cm (6' 0.01\")  Current Weight - Scale: 58.2 kg (128 lb 4.8 oz)    Ideal Body Weight (IBW): 178 lbs  (81 kg)        BMI: 17.4      COMPARATIVE STANDARDS  Total

## 2024-04-16 PROCEDURE — 0JBR3ZZ EXCISION OF LEFT FOOT SUBCUTANEOUS TISSUE AND FASCIA, PERCUTANEOUS APPROACH: ICD-10-PCS | Performed by: PODIATRIST

## 2024-04-16 PROCEDURE — 92526 ORAL FUNCTION THERAPY: CPT

## 2024-04-16 PROCEDURE — 6360000002 HC RX W HCPCS: Performed by: PHYSICAL MEDICINE & REHABILITATION

## 2024-04-16 PROCEDURE — 92507 TX SP LANG VOICE COMM INDIV: CPT

## 2024-04-16 PROCEDURE — 0HBRXZZ EXCISION OF TOE NAIL, EXTERNAL APPROACH: ICD-10-PCS | Performed by: PODIATRIST

## 2024-04-16 PROCEDURE — 97116 GAIT TRAINING THERAPY: CPT

## 2024-04-16 PROCEDURE — 97530 THERAPEUTIC ACTIVITIES: CPT

## 2024-04-16 PROCEDURE — 0JBQ3ZZ EXCISION OF RIGHT FOOT SUBCUTANEOUS TISSUE AND FASCIA, PERCUTANEOUS APPROACH: ICD-10-PCS | Performed by: PODIATRIST

## 2024-04-16 PROCEDURE — 1280000000 HC REHAB R&B

## 2024-04-16 PROCEDURE — 6370000000 HC RX 637 (ALT 250 FOR IP): Performed by: PHYSICAL MEDICINE & REHABILITATION

## 2024-04-16 PROCEDURE — 97110 THERAPEUTIC EXERCISES: CPT

## 2024-04-16 PROCEDURE — 97535 SELF CARE MNGMENT TRAINING: CPT

## 2024-04-16 RX ADMIN — PANTOPRAZOLE SODIUM 40 MG: 40 TABLET, DELAYED RELEASE ORAL at 06:35

## 2024-04-16 RX ADMIN — EPLERENONE 12.5 MG: 25 TABLET, FILM COATED ORAL at 08:57

## 2024-04-16 RX ADMIN — MELATONIN TAB 3 MG 3 MG: 3 TAB at 20:38

## 2024-04-16 RX ADMIN — EMPAGLIFLOZIN 10 MG: 10 TABLET, FILM COATED ORAL at 08:54

## 2024-04-16 RX ADMIN — SACUBITRIL AND VALSARTAN 1 TABLET: 24; 26 TABLET, FILM COATED ORAL at 20:38

## 2024-04-16 RX ADMIN — ENOXAPARIN SODIUM 40 MG: 100 INJECTION SUBCUTANEOUS at 20:37

## 2024-04-16 RX ADMIN — ASPIRIN 81 MG 81 MG: 81 TABLET ORAL at 08:54

## 2024-04-16 RX ADMIN — ATORVASTATIN CALCIUM 80 MG: 80 TABLET, FILM COATED ORAL at 20:38

## 2024-04-16 RX ADMIN — CETIRIZINE HYDROCHLORIDE 10 MG: 10 TABLET, FILM COATED ORAL at 08:54

## 2024-04-16 RX ADMIN — THERA TABS 1 TABLET: TAB at 06:35

## 2024-04-16 ASSESSMENT — PAIN SCALES - GENERAL: PAINLEVEL_OUTOF10: 0

## 2024-04-16 NOTE — CARE COORDINATION
Team conference held today. Spoke with the patient's wife to discuss progress with therapy, barriers to discharge, and plans to return home. Estimated discharge date set for 04/20/2024. Patient anticipates discharging to home with needed supports. The  provided the patient with a home health care list to review. Will continue to follow for support and discharge planning.     Electronically signed by BURTON De Leon on 4/16/2024 at 3:30 PM

## 2024-04-16 NOTE — CONSULTS
Department of Podiatry Consult Note  Resident       Reason for Consult:  Darkened skin plantar aspect of feet, toenails    Requesting Physician:  Jorge Luis Draper MD    CHIEF COMPLAINT:  Onychomycosis    HISTORY OF PRESENT ILLNESS:      The patient is a 73 y.o. male with PMHx of Alzheimer's dementia,CVA, HTN, HLD, TBI, and GERD.  Patient was admitted for an acute ischemic stroke and podiatry was consulted for darkened skin plantar aspect of feet, toenails. Patient states that the pain in his feet is at the nails and along the bottom of his feet. Patient states that the pain is especially evident when he is trying to ambulate. Patient states that he does not follow up with a podiatrist in an outpatient setting Patient denies any other pedal complaints.  Patient denies any N/V/F/C/SoB.     Past Medical History:        Diagnosis Date    Allergic rhinitis     Cerebral artery occlusion with cerebral infarction (HCC) 2016    Dementia (HCC)     Hyperlipidemia     Hypertension     Injury brain, traumatic (HCC)     Polyp, stomach      Past Surgical History:        Procedure Laterality Date    COLONOSCOPY      UPPER GASTROINTESTINAL ENDOSCOPY  08/15/2016    UPPER GASTROINTESTINAL ENDOSCOPY N/A 6/1/2021    EGD BIOPSY performed by Keanu Santana MD at [facility] ENDOSCOPY     Current Medications:    Current Facility-Administered Medications: QUEtiapine (SEROQUEL) tablet 25 mg, 25 mg, Oral, Nightly PRN  aspirin chewable tablet 81 mg, 81 mg, Oral, Daily  atorvastatin (LIPITOR) tablet 80 mg, 80 mg, Oral, Nightly  cetirizine (ZYRTEC) tablet 10 mg, 10 mg, Oral, Daily  empagliflozin (JARDIANCE) tablet 10 mg, 10 mg, Oral, Daily  eplerenone (INSPRA) tablet 12.5 mg, 12.5 mg, Oral, Daily  melatonin tablet 3 mg, 3 mg, Oral, Nightly  metoprolol succinate (TOPROL XL) extended release tablet 50 mg, 50 mg, Oral, Daily  multivitamin 1 tablet, 1 tablet, Oral, Daily  pantoprazole (PROTONIX) tablet 40 mg, 40 mg, Oral, QAM AC  promethazine (PHENERGAN)

## 2024-04-17 PROCEDURE — 97130 THER IVNTJ EA ADDL 15 MIN: CPT

## 2024-04-17 PROCEDURE — 6370000000 HC RX 637 (ALT 250 FOR IP): Performed by: PHYSICAL MEDICINE & REHABILITATION

## 2024-04-17 PROCEDURE — 1280000000 HC REHAB R&B

## 2024-04-17 PROCEDURE — 92507 TX SP LANG VOICE COMM INDIV: CPT

## 2024-04-17 PROCEDURE — 92526 ORAL FUNCTION THERAPY: CPT

## 2024-04-17 PROCEDURE — 97535 SELF CARE MNGMENT TRAINING: CPT

## 2024-04-17 PROCEDURE — 6360000002 HC RX W HCPCS: Performed by: PHYSICAL MEDICINE & REHABILITATION

## 2024-04-17 PROCEDURE — 97129 THER IVNTJ 1ST 15 MIN: CPT

## 2024-04-17 PROCEDURE — 97530 THERAPEUTIC ACTIVITIES: CPT

## 2024-04-17 PROCEDURE — 97110 THERAPEUTIC EXERCISES: CPT

## 2024-04-17 RX ADMIN — ATORVASTATIN CALCIUM 80 MG: 80 TABLET, FILM COATED ORAL at 20:49

## 2024-04-17 RX ADMIN — ENOXAPARIN SODIUM 40 MG: 100 INJECTION SUBCUTANEOUS at 20:48

## 2024-04-17 RX ADMIN — EPLERENONE 12.5 MG: 25 TABLET, FILM COATED ORAL at 11:23

## 2024-04-17 RX ADMIN — ASPIRIN 81 MG 81 MG: 81 TABLET ORAL at 08:46

## 2024-04-17 RX ADMIN — CETIRIZINE HYDROCHLORIDE 10 MG: 10 TABLET, FILM COATED ORAL at 08:46

## 2024-04-17 RX ADMIN — MAGNESIUM HYDROXIDE 30 ML: 400 SUSPENSION ORAL at 20:48

## 2024-04-17 RX ADMIN — THERA TABS 1 TABLET: TAB at 06:19

## 2024-04-17 RX ADMIN — SACUBITRIL AND VALSARTAN 1 TABLET: 24; 26 TABLET, FILM COATED ORAL at 08:46

## 2024-04-17 RX ADMIN — MELATONIN TAB 3 MG 3 MG: 3 TAB at 20:48

## 2024-04-17 RX ADMIN — PANTOPRAZOLE SODIUM 40 MG: 40 TABLET, DELAYED RELEASE ORAL at 06:19

## 2024-04-17 RX ADMIN — SACUBITRIL AND VALSARTAN 1 TABLET: 24; 26 TABLET, FILM COATED ORAL at 20:49

## 2024-04-17 RX ADMIN — EMPAGLIFLOZIN 10 MG: 10 TABLET, FILM COATED ORAL at 08:46

## 2024-04-17 RX ADMIN — METOPROLOL SUCCINATE 50 MG: 50 TABLET, EXTENDED RELEASE ORAL at 08:46

## 2024-04-17 ASSESSMENT — PAIN SCALES - GENERAL
PAINLEVEL_OUTOF10: 0

## 2024-04-17 NOTE — H&P
evaluations.     Barriers: Cognition, speech, hearing, Functional mobility, generalized weakness, medical comorbidities  Services Required: PT, OT, SLP  Goals: Return to PLOF   Prognosis: Good  Anticipated Dispo: Home with assistance  ELOS: 14d     Rehabilitation Diagnosis:   Stroke, 1.9, Other Stroke      Assessment and Plan:  R frontal lobe ischemic stroke  -PT/OT/SLP  -ASA    HTN  -Metoprolol  -Entresto   -Eplerenone    Allergies  Cetirizine    HLD  -Atorvastatin    T2DM  -hold hoome empaglifozin    Insomina  -melatonin    FEN  -Multivitamin    Bladder   -High risk retention   -Monitor PVRs, SC prn >300cc    Bowel   -High risk constipation   -senna+colace BID, PRN miralax, MoM, and bisacodyl supp.    Safety   -fall precautions    Pain control  -Tylenol PRN    PPx  -DVT: lovenox  -GI: pantoprazole    FULL CODE    Juan Chandler DO  4/17/2024, 11:32 AM

## 2024-04-18 LAB
ANION GAP SERPL CALCULATED.3IONS-SCNC: 8 MMOL/L (ref 3–16)
BASOPHILS # BLD: 0 K/UL (ref 0–0.2)
BASOPHILS NFR BLD: 0.9 %
BUN SERPL-MCNC: 31 MG/DL (ref 7–20)
CALCIUM SERPL-MCNC: 9.1 MG/DL (ref 8.3–10.6)
CHLORIDE SERPL-SCNC: 101 MMOL/L (ref 99–110)
CO2 SERPL-SCNC: 30 MMOL/L (ref 21–32)
CREAT SERPL-MCNC: 1.2 MG/DL (ref 0.8–1.3)
DEPRECATED RDW RBC AUTO: 14.4 % (ref 12.4–15.4)
EOSINOPHIL # BLD: 0.1 K/UL (ref 0–0.6)
EOSINOPHIL NFR BLD: 2.2 %
GFR SERPLBLD CREATININE-BSD FMLA CKD-EPI: 63 ML/MIN/{1.73_M2}
GLUCOSE SERPL-MCNC: 118 MG/DL (ref 70–99)
HCT VFR BLD AUTO: 36.3 % (ref 40.5–52.5)
HGB BLD-MCNC: 12.6 G/DL (ref 13.5–17.5)
LYMPHOCYTES # BLD: 1.6 K/UL (ref 1–5.1)
LYMPHOCYTES NFR BLD: 33.9 %
MCH RBC QN AUTO: 31.4 PG (ref 26–34)
MCHC RBC AUTO-ENTMCNC: 34.6 G/DL (ref 31–36)
MCV RBC AUTO: 90.7 FL (ref 80–100)
MONOCYTES # BLD: 0.5 K/UL (ref 0–1.3)
MONOCYTES NFR BLD: 11.3 %
NEUTROPHILS # BLD: 2.4 K/UL (ref 1.7–7.7)
NEUTROPHILS NFR BLD: 51.7 %
PLATELET # BLD AUTO: 155 K/UL (ref 135–450)
PMV BLD AUTO: 8.9 FL (ref 5–10.5)
POTASSIUM SERPL-SCNC: 5.2 MMOL/L (ref 3.5–5.1)
RBC # BLD AUTO: 4 M/UL (ref 4.2–5.9)
SODIUM SERPL-SCNC: 139 MMOL/L (ref 136–145)
WBC # BLD AUTO: 4.6 K/UL (ref 4–11)

## 2024-04-18 PROCEDURE — 36415 COLL VENOUS BLD VENIPUNCTURE: CPT

## 2024-04-18 PROCEDURE — 97110 THERAPEUTIC EXERCISES: CPT

## 2024-04-18 PROCEDURE — 80048 BASIC METABOLIC PNL TOTAL CA: CPT

## 2024-04-18 PROCEDURE — 6370000000 HC RX 637 (ALT 250 FOR IP): Performed by: PHYSICAL MEDICINE & REHABILITATION

## 2024-04-18 PROCEDURE — 6370000000 HC RX 637 (ALT 250 FOR IP): Performed by: STUDENT IN AN ORGANIZED HEALTH CARE EDUCATION/TRAINING PROGRAM

## 2024-04-18 PROCEDURE — 97530 THERAPEUTIC ACTIVITIES: CPT

## 2024-04-18 PROCEDURE — 85025 COMPLETE CBC W/AUTO DIFF WBC: CPT

## 2024-04-18 PROCEDURE — 92507 TX SP LANG VOICE COMM INDIV: CPT

## 2024-04-18 PROCEDURE — 97535 SELF CARE MNGMENT TRAINING: CPT

## 2024-04-18 PROCEDURE — 92526 ORAL FUNCTION THERAPY: CPT

## 2024-04-18 PROCEDURE — 1280000000 HC REHAB R&B

## 2024-04-18 PROCEDURE — 6360000002 HC RX W HCPCS: Performed by: PHYSICAL MEDICINE & REHABILITATION

## 2024-04-18 RX ADMIN — SODIUM ZIRCONIUM CYCLOSILICATE 10 G: 10 POWDER, FOR SUSPENSION ORAL at 10:02

## 2024-04-18 RX ADMIN — PANTOPRAZOLE SODIUM 40 MG: 40 TABLET, DELAYED RELEASE ORAL at 06:56

## 2024-04-18 RX ADMIN — EPLERENONE 12.5 MG: 25 TABLET, FILM COATED ORAL at 08:11

## 2024-04-18 RX ADMIN — SACUBITRIL AND VALSARTAN 1 TABLET: 24; 26 TABLET, FILM COATED ORAL at 19:58

## 2024-04-18 RX ADMIN — CETIRIZINE HYDROCHLORIDE 10 MG: 10 TABLET, FILM COATED ORAL at 08:11

## 2024-04-18 RX ADMIN — ASPIRIN 81 MG 81 MG: 81 TABLET ORAL at 08:11

## 2024-04-18 RX ADMIN — METOPROLOL SUCCINATE 50 MG: 50 TABLET, EXTENDED RELEASE ORAL at 08:11

## 2024-04-18 RX ADMIN — THERA TABS 1 TABLET: TAB at 06:56

## 2024-04-18 RX ADMIN — ATORVASTATIN CALCIUM 80 MG: 80 TABLET, FILM COATED ORAL at 19:58

## 2024-04-18 RX ADMIN — SACUBITRIL AND VALSARTAN 1 TABLET: 24; 26 TABLET, FILM COATED ORAL at 08:11

## 2024-04-18 RX ADMIN — EMPAGLIFLOZIN 10 MG: 10 TABLET, FILM COATED ORAL at 08:11

## 2024-04-18 RX ADMIN — MELATONIN TAB 3 MG 3 MG: 3 TAB at 19:58

## 2024-04-18 RX ADMIN — ENOXAPARIN SODIUM 40 MG: 100 INJECTION SUBCUTANEOUS at 20:02

## 2024-04-18 ASSESSMENT — PAIN SCALES - WONG BAKER: WONGBAKER_NUMERICALRESPONSE: NO HURT

## 2024-04-18 ASSESSMENT — PAIN SCALES - GENERAL: PAINLEVEL_OUTOF10: 0

## 2024-04-18 NOTE — CARE COORDINATION
Discharge Planning.     The SW spoke with the patient's wife Melissa regarding her home health care choices. Melissa stated they have no choose one yet but stated the SW can send a referral to a [facility] agency. The SW called Destinee with [facility] with the referral. Destinee stated they can accept and will follow. PT/OT/RN/Aide  will be provided.     [facility]  [address] [place] [address] [address],   [place] [address]  Phone: [phone]  Fax: [phone]       Electronically signed by BURTON De Leon on 4/18/2024 at 9:00 AM

## 2024-04-18 NOTE — CARE COORDINATION
Discharge Planning.     The SW called and spoke with the patient's wife Melissa. The SW informed Melissa that the discharge date has been changed to 04/19/2024. The patient's wife is agreeable to the patient's discharge and stated their nephew will be there tomorrow to  the patient by 1pm. The patient will discharge home with [facility].    Electronically signed by BURTON De Leon on 4/18/2024 at 2:03 PM

## 2024-04-18 NOTE — DISCHARGE INSTRUCTIONS
We hope your stay on rehab has exceeded your expectations. Once again the entire Acute Rehab Staff at [facility] wish to thank you for allowing us the privilege to care for you.         A few days after you are discharged from Rehab, you will receive a survey (Bernard  Ganfelicia) in the mail or through email.  This is a nationally distributed survey sent to thousands of rehab patients throughout the nation.              It is very important to everyone on the rehab unit that we receive feedback based on your experience.          Thank you, we wish you good health always,         Acute Rehab Team      Hospital Preference:     __[facility]        Medical Diagnosis/Conditions    _______________________ (free text)    Emergency Contact:    ________________________________________Phone#________________________      Advanced Directives:    Code Status: ?  [x]  Full Code  ?  []  DNR  ? []  DNRCC  ? []  DNRCC - Arrest    (as of date of discharge:  _________)      Medical POA: ?  []   Yes ______________________________ ?  []   No                                       (Name and phone number)                     Living Will:   ?   []   Yes    ?  []   No        Insurance Information:    _______________________ (free text)      Individual Responsible  for the coordination of the discharge/follow up:    ______________________________________________________    Functional Status:    VISUAL DEFICITS:    Yes [x]  No  []       If yes, assisted device:   Wears Glasses Yes []  No  [x]  Wears Contacts  Yes []  No  []  Legally Blind Yes []  No  []    HEARING DEFICITS:    Yes [x]  No  []       If yes, assisted device:   Wears Hearing Aids Yes []  No  [x]  Pocket Talker  Yes []  No  []       Physical Therapist & Contact #: Dede Soni, [phone]  OccupationalTherapist & Contact #:  Speech Therapist & Contact #:       Activities of Daily Living:     ADL's - Adaptive Equipment used _____________________ (free

## 2024-04-19 VITALS
HEART RATE: 68 BPM | BODY MASS INDEX: 18.07 KG/M2 | OXYGEN SATURATION: 98 % | HEIGHT: 72 IN | RESPIRATION RATE: 16 BRPM | WEIGHT: 133.4 LBS | TEMPERATURE: 98 F | DIASTOLIC BLOOD PRESSURE: 70 MMHG | SYSTOLIC BLOOD PRESSURE: 112 MMHG

## 2024-04-19 LAB
ANION GAP SERPL CALCULATED.3IONS-SCNC: 8 MMOL/L (ref 3–16)
BUN SERPL-MCNC: 31 MG/DL (ref 7–20)
CALCIUM SERPL-MCNC: 9.3 MG/DL (ref 8.3–10.6)
CHLORIDE SERPL-SCNC: 103 MMOL/L (ref 99–110)
CO2 SERPL-SCNC: 30 MMOL/L (ref 21–32)
CREAT SERPL-MCNC: 1.2 MG/DL (ref 0.8–1.3)
GFR SERPLBLD CREATININE-BSD FMLA CKD-EPI: 63 ML/MIN/{1.73_M2}
GLUCOSE SERPL-MCNC: 108 MG/DL (ref 70–99)
POTASSIUM SERPL-SCNC: 5.1 MMOL/L (ref 3.5–5.1)
SODIUM SERPL-SCNC: 141 MMOL/L (ref 136–145)

## 2024-04-19 PROCEDURE — 6370000000 HC RX 637 (ALT 250 FOR IP): Performed by: STUDENT IN AN ORGANIZED HEALTH CARE EDUCATION/TRAINING PROGRAM

## 2024-04-19 PROCEDURE — 80048 BASIC METABOLIC PNL TOTAL CA: CPT

## 2024-04-19 PROCEDURE — 6370000000 HC RX 637 (ALT 250 FOR IP): Performed by: PHYSICAL MEDICINE & REHABILITATION

## 2024-04-19 RX ORDER — OMEPRAZOLE 20 MG/1
20 CAPSULE, DELAYED RELEASE ORAL DAILY
Qty: 30 CAPSULE | Refills: 0 | Status: SHIPPED | OUTPATIENT
Start: 2024-04-19

## 2024-04-19 RX ORDER — ASPIRIN 81 MG/1
81 TABLET, CHEWABLE ORAL DAILY
Qty: 90 TABLET | Refills: 0 | Status: SHIPPED | OUTPATIENT
Start: 2024-04-19

## 2024-04-19 RX ORDER — ATORVASTATIN CALCIUM 80 MG/1
80 TABLET, FILM COATED ORAL NIGHTLY
Qty: 90 TABLET | Refills: 0 | Status: SHIPPED | OUTPATIENT
Start: 2024-04-19

## 2024-04-19 RX ADMIN — THERA TABS 1 TABLET: TAB at 06:21

## 2024-04-19 RX ADMIN — ASPIRIN 81 MG 81 MG: 81 TABLET ORAL at 09:46

## 2024-04-19 RX ADMIN — METOPROLOL SUCCINATE 50 MG: 50 TABLET, EXTENDED RELEASE ORAL at 09:45

## 2024-04-19 RX ADMIN — CETIRIZINE HYDROCHLORIDE 10 MG: 10 TABLET, FILM COATED ORAL at 09:45

## 2024-04-19 RX ADMIN — SODIUM ZIRCONIUM CYCLOSILICATE 10 G: 10 POWDER, FOR SUSPENSION ORAL at 11:45

## 2024-04-19 RX ADMIN — SACUBITRIL AND VALSARTAN 1 TABLET: 24; 26 TABLET, FILM COATED ORAL at 09:45

## 2024-04-19 RX ADMIN — EMPAGLIFLOZIN 10 MG: 10 TABLET, FILM COATED ORAL at 09:46

## 2024-04-19 RX ADMIN — PANTOPRAZOLE SODIUM 40 MG: 40 TABLET, DELAYED RELEASE ORAL at 06:21

## 2024-04-19 RX ADMIN — EPLERENONE 12.5 MG: 25 TABLET, FILM COATED ORAL at 09:46

## 2024-04-19 ASSESSMENT — PAIN SCALES - GENERAL: PAINLEVEL_OUTOF10: 0

## 2024-04-19 NOTE — PROGRESS NOTES
[facility] - Inpatient Rehabilitation Department   Phone: [phone]    Speech Therapy    [] Initial Evaluation            [x] Daily Treatment Note         [x] Discharge Summary      Patient: Maximo Camacho   : 1950   MRN: [id number]   Date of Service:  2024  Admitting Diagnosis: Acute ischemic stroke (HCC)  Current Admission Summary: Maximo Camacho is a 73M with a PMHx of Alzheimer's dementia, history of CVA, hypertension, hyperlipidemia, TBI, GERD who presented with acute confusion, found to have a R ICA stroke.  Patient originally presented to [facility] ED on 24 after being brought in by EMS transportation for AMS, after being found walking around confused and leaning to the left, would not speak. CT head with no acute changes. UA negative. Patient admitted for further care. MRI brain showed cute infarct within the anterior aspect of the right frontal lobe and cortex of the right pre and postcentral gyri and adjacent right parietal lobe. Admission course complicated by nonsustained Vtach. he continued to stabilize medically, was evaluated by therapy services and found to be an appropriate candidate for inpatient rehabilitation for medically supervised 3 hours of therapy 5 days a week.  On interview, 2 family members at bedside. State that patient is hard of hearing in addition to dementia and history of strokes. Discussed rehab.  Past Medical History:  has a past medical history of Allergic rhinitis, Cerebral artery occlusion with cerebral infarction (HCC), Dementia (HCC), Hyperlipidemia, Hypertension, Injury brain, traumatic (HCC), and Polyp, stomach.  Past Surgical History:  has a past surgical history that includes Colonoscopy; Upper gastrointestinal endoscopy (08/15/2016); and Upper gastrointestinal endoscopy (N/A, 2021).  Recent MRI Brain completed 24:  IMPRESSION:  1. Acute infarcts within the anterior aspect of the right frontal lobe and  cortex of the right 
  [facility] - Inpatient Rehabilitation Department   Phone: [phone]    Physical Therapy    [x] Initial Evaluation            [] Daily Treatment Note         [] Discharge Summary      Patient: Maximo Camacho   : 1950   MRN: [id number]   Date of Service:  4/15/2024  Admitting Diagnosis: Acute ischemic stroke (HCC)  Current Admission Summary: Maximo Camacho is a 73M with a PMHx of Alzheimer's dementia, history of CVA, hypertension, hyperlipidemia, TBI, GERD who presented with acute confusion, found to have a R ICA stroke.     Patient originally presented to [facility] ED on 24 after being brought in by EMS transportation for AMS, after being found walking around confused and leaning to the left, would not speak. CT head with no acute changes. UA negative. Patient admitted for further care. MRI brain showed cute infarct within the anterior aspect of the right frontal lobe and cortex of the right pre and postcentral gyri and adjacent right parietal lobe. Admission course complicated by nonsustained Vtach.   Past Medical History:  has a past medical history of Allergic rhinitis, Cerebral artery occlusion with cerebral infarction (HCC), Dementia (HCC), Hyperlipidemia, Hypertension, Injury brain, traumatic (HCC), and Polyp, stomach.  Past Surgical History:  has a past surgical history that includes Colonoscopy; Upper gastrointestinal endoscopy (08/15/2016); and Upper gastrointestinal endoscopy (N/A, 2021).  Discharge Recommendations: home with HHPT  DME Required For Discharge: DME to be determined pending patient progress  Precautions/Restrictions: high fall risk  Weight Bearing Restrictions: no restrictions       Required Braces/Orthotics: no braces required     Positional Restrictions:no positional restrictions    Lives With: spouse                  Type of Home: house  Home Layout: one level  Home Access: level entry  Bathroom Layout: tub only  Bathroom Equipment: shower chair, 
  [facility] - Inpatient Rehabilitation Department   Phone: [phone]    Occupational Therapy    [] Initial Evaluation            [x] Daily Treatment Note         [] Discharge Summary      Patient: Maximo Camacho   : 1950   MRN: [id number]   Date of Service:  2024    Admitting Diagnosis:  Acute ischemic stroke (HCC)  Current Admission Summary: Maximo Camacho is a 73M with a PMHx of Alzheimer's dementia, history of CVA, hypertension, hyperlipidemia, TBI, GERD who presented with acute confusion, found to have a R ICA stroke.  Patient originally presented to [facility] ED on 24 after being brought in by EMS transportation for AMS, after being found walking around confused and leaning to the left, would not speak. CT head with no acute changes. UA negative. Patient admitted for further care. MRI brain showed cute infarct within the anterior aspect of the right frontal lobe and cortex of the right pre and postcentral gyri and adjacent right parietal lobe. Admission course complicated by nonsustained Vtach.    Past Medical History:  has a past medical history of Allergic rhinitis, Cerebral artery occlusion with cerebral infarction (HCC), Dementia (HCC), Hyperlipidemia, Hypertension, Injury brain, traumatic (HCC), and Polyp, stomach.  Past Surgical History:  has a past surgical history that includes Colonoscopy; Upper gastrointestinal endoscopy (08/15/2016); and Upper gastrointestinal endoscopy (N/A, 2021).    Discharge Recommendations:  SUP/assist, level of OT needs TBD pending progress     DME Required For Discharge: shower chair with back, hand-held shower head, grab bars - toilet, grab bars - shower    Precautions/Restrictions: high fall risk, modified diet  Weight Bearing Restrictions: no restrictions       Required Braces/Orthotics: no braces required     Positional Restrictions:no positional restrictions    Pre-Admission Information     All information taken from chart 
  [facility] - Inpatient Rehabilitation Department   Phone: [phone]    Occupational Therapy    [] Initial Evaluation            [x] Daily Treatment Note         [] Discharge Summary      Patient: Maximo Camacho   : 1950   MRN: [id number]   Date of Service:  2024    Admitting Diagnosis:  Acute ischemic stroke (HCC)  Current Admission Summary: Maximo Camacho is a 73M with a PMHx of Alzheimer's dementia, history of CVA, hypertension, hyperlipidemia, TBI, GERD who presented with acute confusion, found to have a R ICA stroke.  Patient originally presented to [facility] ED on 24 after being brought in by EMS transportation for AMS, after being found walking around confused and leaning to the left, would not speak. CT head with no acute changes. UA negative. Patient admitted for further care. MRI brain showed cute infarct within the anterior aspect of the right frontal lobe and cortex of the right pre and postcentral gyri and adjacent right parietal lobe. Admission course complicated by nonsustained Vtach.    Past Medical History:  has a past medical history of Allergic rhinitis, Cerebral artery occlusion with cerebral infarction (HCC), Dementia (HCC), Hyperlipidemia, Hypertension, Injury brain, traumatic (HCC), and Polyp, stomach.  Past Surgical History:  has a past surgical history that includes Colonoscopy; Upper gastrointestinal endoscopy (08/15/2016); and Upper gastrointestinal endoscopy (N/A, 2021).    Discharge Recommendations:  SUP/assist, level of OT needs TBD pending progress     DME Required For Discharge: shower chair with back, hand-held shower head, grab bars - toilet, grab bars - shower    Precautions/Restrictions: high fall risk, modified diet  Weight Bearing Restrictions: no restrictions       Required Braces/Orthotics: no braces required     Positional Restrictions:no positional restrictions    Pre-Admission Information     All information taken from chart 
4 Eyes Skin Assessment     NAME:  Maxiom Camacho  YOB: 1950  MEDICAL RECORD NUMBER:  [id number]    The patient is being assessed for  Admission    I agree that at least one RN has performed a thorough Head to Toe Skin Assessment on the patient. ALL assessment sites listed below have been assessed.      Areas assessed by both nurses:    Head, Face, Ears, Shoulders, Back, Chest, Arms, Elbows, Hands, Sacrum. Buttock, Coccyx, Ischium, Legs. Feet and Heels, and Under Medical Devices         Does the Patient have a Wound? Yes wound(s) were present on assessment. LDA wound assessment was Initiated and completed by RN. Darkened areas noted on bottom of bilateral feet. Raised, darkened areas scattered on back and bilateral shoulders.       Kaiden Prevention initiated by RN: No  Wound Care Orders initiated by RN: No    Pressure Injury (Stage 3,4, Unstageable, DTI, NWPT, and Complex wounds) if present, place Wound referral order by RN under : No    New Ostomies, if present place, Ostomy referral order under : No     Nurse 1 eSignature: Electronically signed by Graciela Hudson RN on 4/15/24 at 8:11 AM EDT    **SHARE this note so that the co-signing nurse can place an eSignature**    Nurse 2 eSignature: Electronically signed by Clifton Navarrete RN on 4/15/24 at 8:13 AM EDT    
ARU Admission Assessment    Ethnicity  \"Are you of , /a, or [place]er origin?\"  Check all that apply:  [] A.  No, not of , /a, or [place] Origin  [] B.  Yes, Zambian, Zambian American, Chicano/a  [] C.  Yes, Macanese  [] D.  Yes, Mongolian  [] E.  Yes, another , , or [place] origin  [x] X.  Patient unable to respond  [] Y.  Patient declines to respond    Race  \"What is your race?\"  Check all that apply:  [] A.  White  [] B.  Black or   [] C.   or   [] D.     [] E.  Chinese  [] F.  Turkish  [] G. Malawian  [] H.  Estonian  [] I.  Surinamese  [] J.  Other   [] K.    [] L.  Slovak or Elieser  [] M.  Ukrainian  [] N.  Other   [x] X.  Patient unable to respond  [] Y.  Patient declines to respond  [] Z.  None of the above    Language  A.  \"What is your preferred language?\"   Patient did not respond, but is noted to communicate in English.    B.  \"Do you need or want an  to communicate with a doctor or health care staff?\"  Check only one:  [] 0.  No  [] 1.  Yes  [x] 9.  Unable to determine    Transportation  \"Has lack of transportation kept you from medical appointments, meetings, work, or from getting things needed for daily living?\"Check all that apply:  [] A.  Yes, it has kept me from medical appointments or from getting my medications  [] B.  Yes, it has kept me from non-medical meetings, appointments, work, or from getting things that I need  [] C.  No  [x] X.  Patient unable to respond  [] Y.  Patient declines to respond    Hearing  Ability to hear (with hearing aid or hearing appliances if normally used)  []  0.  Adequate - no difficulty in normal conversation, social interaction, listening to TV  []  1.  Minimal difficulty - difficulty in some environments (e.g. when person speaks softly or setting is noisy)  [x]  2.  Moderate difficulty - speaker has to increase volume and 
ARU Discharge Assessment    Transportation  \"Has lack of transportation kept you from medical appointments, meetings, work, or from getting things needed for daily living?\"Check all that apply:  [] A.  Yes, it has kept me from medical appointments or from getting my medications  [] B.  Yes, it has kept me from non-medical meetings, appointments, work, or from getting things that I need  [] C.  No  [x] X.  Patient unable to respond  [] Y.  Patient declines to respond    Provision of Current Reconciled Medication List to Subsequent Provider at Discharge  [] No, current reconciled medication list not provided to the subsequent provider.  [x] Yes, current reconciled medication list provided to the subsequent provider. (**Select route of transmission below**)   [] Via Electronic Health Record   [] Via Health Information Exchange Organization  [] Verbal (e.g. in person, telephone, video conferencing)  [x] Paper-based (e.g. fax, copies, printouts)   [] Other Methods (e.g. texting, email, CDs)    Provision of Current Reconciled Medication List to Patient at Discharge  [] No, current reconciled medication list not provided to the patient, family and/or caregiver.   [x] Yes, current reconciled medication list provided to the patient, family and/or caregiver.  (**Select route of transmission below**)   [] Via Electronic Health Record (e.g., electronic access to patient portal)   [] Via Health Information Exchange Organization  [x] Verbal (e.g. in person, telephone, video conferencing)  [x] Paper-based (e.g. fax, copies, printouts)   [] Other Methods (e.g. texting, email, CDs)    Health Literacy  \"How often do you need to have someone help you when you read instructions, pamphlets, or other written material from your doctor or pharmacy?\"  []  0.  Never  []  1.  Rarely  []  2.  Sometimes  []  3.  Often  []  4.  Always  []  7.  Patient declines to respond  [x]  8.  Patient unable to respond    BIMS - **Must be completed in the 
Admission Period/Goal QM Codes for Maximo Camacho.    QM Admit Code Goal Code   Eating 6 6   Oral Hygiene 4 6   Toileting Hygiene 4 5   Shower/Bathing 4 5   UB Dressing 4 5   LB Dressing 4 5   Putting on/off Footwear 4 5   Rolling Left and Right 4 6   Sit To Lying 4 6   Lying to Sitting on Bedside 4 6   Sit to Stand 4 6   Chair/Bed to Chair Transfer 4 6   Toilet Transfers 4 6   Car Transfers 4 6   Walk 10 Feet 4 6   Walk 50 Feet with Two Turns 4 6   Walk 150 Feet 4 6   Walk 10 Feet on Uneven Surfaces 3 6   1 Step (Curb) 4 6   4 Steps 4 6   12 Steps 88 6   Picking up Object from Floor 4 6   Wheel 50 Feet with 2 Turns 9 -   Type - -   Wheel 150 Feet 9 -   Type - -       The above codes were determined by the treatment team to be the patient's accurate admission assessment codes based on assessment performed soon after the patient's admission and prior to the benefit of services provided by staff, or if appropriate, the patient's usual performance at admission.    OT:  Joseph Fields OTR/KAELYN #[id number], 4/18/2024, 0719     PT:  Dede Soni, PT [id number], 4/17/24, 1334     RN:  Amita Michelle RN 4/17/2024 1312     ST:  Regla Pace M.A. CCC-SLP #[id number] 4/18/24, 1129     :  Deondre Rice PT, DPT [id number]  4/18/24  12:10 PM     
Assessment complete. Alert. Difficult to assess orientation level as patient does not provide many verbal responses. When asked to state name, patient stated his date of birth. Speech slurred. Denies pain. Appears comfortable. Followed all commands appropriately. Denies need to go to the bathroom. The care plan and education has been reviewed and mutually agreed upon with the patient. In bed, alarm on, bed in lowest position, call light and table within reach, sitter and camera at bedside. No further needs expressed at this time.  
CLINICAL PHARMACY NOTE: MEDS TO BEDS    Total # of Prescriptions Filled: 3   The following medications were delivered to the patient:  ATORVASTATIN CALCIUM 80MG TABS  OMEPRAZOLE 20MG CPDR  ASPIRIN LOW DOSE 81MG CHEW    Additional Documentation: Amita BENTLEY approved to deliver medications to patient room=signed  [facility] Tech  
Clifton, RN, noted patient was attempting to get out of bed without assistance. Clifton reports that patient would not follow instructions for safety throughout transfer to bathroom. Patient back in bed upon this RN's entry to room. Alarm on, bed in lowest position, call light and table within reach, walkways free of clutter. Video-monitoring initiated to promote patient safety. This RN educated patient on fall risk and precautions. This RN had to repeat herself multiple times as patient was not acknowledging her talking to him. Patient verbalized understanding to use call light and await assistance for all transfers. No further needs expressed at this time.    0150  Bed alarm sounding while this RN was down the sigala. Upon entry to room, patient walking next to bed without walker, holding on to furniture. Patient began using walker after this RN placed it in front of him. Patient did not answer this RN when asked if he had to go to the bathroom. Gait belt not in place as patient's impulsivity did not allow time for this RN to put it on him. Patient unsteady and unpredictable in his movements. Unable to safely leave patient's side to obtain gait belt at bedside. This RN held tightly onto patient's gown throughout transfer. Upon entry to bathroom, patient abandoned walker. Despite this RN's instructions for a safe transfer, patient would not use walker. Once in front of toilet, patient bent up and down at the waist multiple times to raise toilet seat and lower diaper. After standing to void, patient wanted to change his diaper. This RN instructed patient to turn around and sit on toilet to safely remove old diaper and juliet a new one. Despite this RN's directions, patient took off his diaper while standing. Patient began walking out of bathroom without walker. Patient used walker after this RN placed it in front of him; however, patient abandoned walker a few feet before going to sit on the bed. After patient seated safely 
Maximo Camacho  4/15/2024  [id number]    Chief Complaint: Acute ischemic stroke (HCC)    Subjective:   Patient was restless, impulsive overnight last night.  He complains of itching on his back, and groin.      ROS is otherwise limited due to cognitive deficits, aphasia.    Last BM:      Objective:  Patient Vitals for the past 24 hrs:   BP Temp Temp src Pulse Resp SpO2 Height Weight   04/15/24 1011 103/63 97.7 °F (36.5 °C) Oral 76 16 94 % -- --   04/15/24 0442 -- -- -- -- -- -- -- 58.2 kg (128 lb 4.8 oz)   04/14/24 2243 113/65 98.3 °F (36.8 °C) Oral 73 16 96 % -- --   04/14/24 1930 -- -- -- -- -- -- 1.829 m (6') 58.4 kg (128 lb 12.8 oz)   04/14/24 1900 123/68 98.3 °F (36.8 °C) Oral 78 16 96 % -- --     Gen: No distress, pleasant.   HEENT: Normocephalic, atraumatic.   CV: Regular rate and rhythm. Extremities warm, well perfused.   Resp: No respiratory distress. CTAB   Abd: Soft, nontender   Ext: No edema.   Neuro: Alert.  Comprehension impaired for simple commands. Tangential, perseverative.     Laboratory data: Available via EMR.     Therapy progress:       PT    Supine to Sit:    Sit to Supine:     Sit to Stand:    Chair/Bed to Chair Transfer:    Car Transfer:    Ambulation 10 ft:    Ambulation 50 ft:    Ambulation 150 ft:    Stairs - 1 Step:    Stairs - 4 Step:    Stairs - 12 Step:      OT    Eating:    Oral Hygiene:    Bathing:    Upper Body Dressing:    Lower Body Dressing:    Toilet Transfer:    Toilet Hygiene:      Speech Therapy         Body mass index is 17.4 kg/m².    Assessment:    This patient continues to require an ARU level of care from all disciplines to address the following issues:    Patient Active Problem List   Diagnosis    Remote history of stroke    TBI (traumatic brain injury) (HCC)    Partial deafness    Cerebral infarction (HCC)    Dilated aortic root (HCC)    UGIB (upper gastrointestinal bleed)    Anemia associated with acute blood loss    Allergic rhinitis    Chronic abdominal pain    
Maximo Camacho  4/17/2024  [id number]    Chief Complaint: Acute ischemic stroke (HCC)    Subjective:   No acute events overnight. Patient reports that he is doing well today. Denies any new complaints.     Last BM: Stool Occurrence: 0 (04/17/24 0300)    Objective:  Patient Vitals for the past 24 hrs:   BP Temp Temp src Pulse Resp SpO2 Weight   04/17/24 1123 108/64 -- -- 68 -- -- --   04/17/24 0845 110/66 98 °F (36.7 °C) Oral 76 16 97 % --   04/17/24 0606 -- -- -- -- -- -- 58.5 kg (128 lb 14.4 oz)   04/16/24 2030 135/69 98.1 °F (36.7 °C) Oral 69 16 99 % --     Gen: No distress, pleasant. Using pocket-talker assistive hearing device.   HEENT: Normocephalic, atraumatic.   CV: Regular rate and rhythm. Extremities warm, well perfused.   Resp: No respiratory distress. CTAB   Abd: Soft, nontender   Ext: No edema.   Neuro: Alert, more responsive to questions today.     Laboratory data: Available via EMR.     Therapy progress:       PT    Supine to Sit: Supervision or touching assistance  Sit to Supine: Supervision or touching assistance   Sit to Stand: Supervision or touching assistance  Chair/Bed to Chair Transfer: Supervision or touching assistance  Car Transfer: Supervision or touching assistance  Ambulation 10 ft: Supervision or touching assistance  Ambulation 50 ft: Supervision or touching assistance  Ambulation 150 ft: Supervision or touching assistance  Stairs - 1 Step: Supervision or touching assistance  Stairs - 4 Step: Supervision or touching assistance  Stairs - 12 Step:      OT    Eating: Independent  Oral Hygiene: Supervision or touching assistance  Bathing: Supervision or touching assistance  Upper Body Dressing: Supervision or touching assistance  Lower Body Dressing: Supervision or touching assistance  Toilet Transfer: Supervision or touching assistance  Toilet Hygiene:      Speech Therapy    Pt with significant communication impairments secondary to inability to consistently answer y/n questions, basic 
Maximo Camacho  4/18/2024  [id number]    Chief Complaint: Acute ischemic stroke (HCC)    Subjective:   No acute events overnight. Patient reports that he is doing well today. Endorses some mild left lower extremity weakness, trouble keeping his leg on the footrest of the recumbent bike.     Last BM: Stool Occurrence: 1 (04/18/24 1427)    Objective:  Patient Vitals for the past 24 hrs:   BP Temp Temp src Pulse Resp SpO2 Weight   04/18/24 0802 131/71 97.9 °F (36.6 °C) Oral 64 17 99 % --   04/18/24 0430 -- -- -- -- -- -- 59 kg (130 lb)   04/17/24 2045 (!) 167/75 98.2 °F (36.8 °C) Oral 62 18 98 % --     Gen: No distress, pleasant.   HEENT: Normocephalic, atraumatic.   CV: Regular rate and rhythm. Extremities warm, well perfused.   Resp: No respiratory distress. CTAB   Abd: Soft, nontender   Ext: No edema.   Neuro: Alert, repetition required for simple questions/instructions.     Laboratory data: Available via EMR.     Therapy progress:       PT    Supine to Sit: Independent  Sit to Supine: Independent   Sit to Stand: Independent  Chair/Bed to Chair Transfer: Independent  Car Transfer: Independent  Ambulation 10 ft: Supervision or touching assistance  Ambulation 50 ft: Supervision or touching assistance  Ambulation 150 ft: Supervision or touching assistance  Stairs - 1 Step: Supervision or touching assistance  Stairs - 4 Step: Supervision or touching assistance  Stairs - 12 Step: Supervision or touching assistance    OT    Eating: Independent  Oral Hygiene: Supervision or touching assistance  Bathing: Supervision or touching assistance  Upper Body Dressing: Supervision or touching assistance  Lower Body Dressing: Supervision or touching assistance  Toilet Transfer: Independent  Toilet Hygiene: Independent    Speech Therapy    Pt with significant communication impairments secondary to inability to consistently answer y/n questions, basic questions, complete basic language tasks or provide personal/ background history. 
Patient discharged to home with home care. No IV at discharge. Discharge instructions provided and explained to patient's nephew, all questions answered, nephew verbalized understanding. All belongings sent with patient. Patient transported to front entrance in wheelchair, transported home by nephew.   
Patient was admitted to room 4907 at 1900.  Patient was oriented to the Call Light, Phone, TV, Thermostat, Bed Controls, Bathroom and Emergency Cord.  Patient verbalized and demonstrated understanding of all.  Patient was also given an overview of Unit Routines for Acute Rehab, including the patient's rights and responsibilities as well as the CMS IRF [place] Privacy Act Statement providing notice of data collection.  Patient states that their normal bowel regime is unknown; patient not answer question when asked. Meal times were explained, including how to order food.  The white board, (which is posted on the wall by the door is used for communication) has the Therapy Scheduled that is posted each day along with the name of your doctor, nurse, and therapist for your convenience.  We recommend any family that will be care givers or any care givers the patient has, take part in therapy.  We have no set visiting hours, we suggest non-caregiver friends and family visitors come after therapy (at 4 PM or later) to allow patient to rest in between sessions.      In conjunction with the patient and patient’s family, this nurse worked to establish a tailored Fall TIPS plan to ensure patient safety and compliance:    Falls TIPS Completion    Patient identified as increased risk for harm if fall:  [x] Yes     Fall Risks  History of Falls:    [x] Yes   Medication Side Effects:   [x] Yes   Walking Aid:    [x] Yes   IV Pole or Equipment:   [] Yes   Unsteady Walk:     [x] Yes   May Forget or Choose Not to Call: [x] Yes     Fall Interventions   Communicate Recent Fall and/or Risk of Harm: [x] Yes   Walking Aids:  Crutches: [] Yes   Cane: [] Yes   Walker: [x] Yes   IV Assistance When Walking: [] Yes   Toileting Schedule: Every 2 Hours  Bedpan:   [] Yes   Assist to Commode: [] Yes   Assist to Bathroom: [x] Yes   Bed Alarm On: [x] Yes   Assistance Out of Bed:  Bedrest: [] Yes   1 Person: [x] Yes   2 People: [] Yes     
Pt comfortably resting in chair. BP 98/62, held metoprolol and entresto per parameters. Denies any pain at this moment. Morning meds given per MAR. PWW applesauce. Pt does not respond to the orientation assessment. Call light and bedside table in reach. Chair locked and alarm on. Pt eating his breakfast independently after set up. The care plan and education has been reviewed and mutually agreed upon with the patient.      
Pt getting OOB without calling for help. Assisted pt to the bathroom and back to bed. Reoriented to the situation. All safety measures in place.     1729: pt got up and went to the bathroom again without calling for help. All safety measures in place.  
hand held shower head - but normally does not use it  Toilet Height: standard height  Home Equipment: no prior equipment  Transfer Assistance: Independent without use of device  Ambulation Assistance:Independent without use of device  ADL Assistance: independent with all ADL's  IADL Assistance: independent with homemaking tasks  Active :        [] Yes                 [x] No   Hand Dominance: [] Left                 [x] Right  Current Employment:  not asked due to communication difficulty  Hobbies:   Recent Falls: pt had 3 falls last November. Last fall was around a week ago, fell when stepping down on an unstable step. Patient's spouse states that patient typically falls anteriorly with his hands out.           Examination   Vision:   Vision Gross Assessment: WFL  Hearing:   hard of hearing - has hearing aids for both ears but does not have them with him in the hospital     Observation:   General Observation:  Fwd head posture         Subjective  General: Pt was seated on the couch at arrival.  Vocalized the need to use the bathroom during session. Otherwise was quiet but did follow commands fairly well with some repetition and demonstration.   Pain: Patient does not rate upon questioning  Pain Interventions: not applicable       Functional Mobility  Bed Mobility:  Sit to Supine: stand by assistance  Comments: HOB elevated, pt doffed his shoes while EOB prior to lying down   Transfers:  Sit to stand transfer: stand by assistance, contact guard assistance  Stand to sit transfer: stand by assistance  Comments: Increased cues (verbal and tactile) for initial stand only   Ambulation:  Surface:level surface  Assistive Device: no device  Assistance: contact guard assistance/ intermittent HHA  Distance: 350 ft   Gait Mechanics: flat foot contact, NBOS, partial step-through pattern only, antalgic with hip drop B and med/lat sway, decreased arm swing;  LLE with decrease foot clearance (noted today with shoes present) 
portions however pt did not respond or had inconsistent head nods     Lives With: spouse                  Type of Home: house  Home Layout: one level  Home Access: level entry  Bathroom Layout: tub only (per jaylin review tub only; however during shower pt describing use of fixed shower head)  Bathroom Equipment: shower chair, hand held shower head - but normally does not use it  Toilet Height: standard height  Home Equipment: no prior equipment  Transfer Assistance: Independent without use of device  Ambulation Assistance:Independent without use of device  ADL Assistance: independent with all ADL's  IADL Assistance: independent with homemaking tasks  Active :        [] Yes                 [x] No  Hand Dominance: [] Left                 [x] Right  Current Employment:  not asked due to communication difficulty  Hobbies:   Recent Falls: pt had 3 falls last November. Last fall was around a week ago, fell when stepping down on an unstable step      Examination   Vision:   Unable to assess d/t cognition; pt did not respond when questioned. Appears WFL based on observations during functional tasks.  Hearing:   per jaylin review pt is [place], hearing aides are not present at hospital   Perception:   Unable to assess   Observation:   General Observation:  head forward, kyphotic; bruising on L UE  Sensation:   Unable to formally test secondary to cognition and communication  Tone:   Normotonic  Coordination Testing:   Unable to formally test secondary to cognition; pt able to mimic pincer grasp but unable to command follow to attempt thumb oppositions to other digits; pt has joint deformities, primarily in MCPs and most significant in (B) digit V      ROM:   (B) UE AROM WFL  Strength:   (B) UE strength grossly WFL    Therapist Clinical Decision Making (Complexity): medium complexity  Clinical Presentation: stable      Subjective  General: pt in recliner at entry, agreeable to OT eval/shower. Pt with limited/variable vocal 
    Ambulation Trial 2  Surface:foam surface  Assistive Device: no device  Assistance: contact guard assistance  Distance: 20 ft   Gait Mechanics: same as above with increased inability due to surface type  Comments:        Stair Mobility:  Number of Steps: 12  Step Height: 6 inch  Hand Rails: (L) ascending handrail  Assistance: stand by assistance  Comments:  Wheelchair Mobility:  No w/c mobility completed on this date.  Comments:  Balance:  Static Sitting Balance: good: independent with functional balance in unsupported position  Dynamic Sitting Balance: good: independent with functional balance in unsupported position  Static Standing Balance: good: independent with functional balance in unsupported position  Dynamic Standing Balance: fair (+): maintains balance at SBA/supervision without use of UE support  Patient completes object retrieval from floor in standing position at Independent  Comments:    Other Therapeutic Interventions    First session: see functional mobility details above.   -Seated stepper x 5 minutes  -Quadruped: prayer stretching, opp arm raises, and opp leg raises  -Standing RDL with 4# wts x 20  -Seated OH raise with brian x 15 and row + scap squeeze with brian x 15  -Seated back extension with blue tband x 20  -Corner chest stretch + modified push-ups x 20    Second session:  Attempted. Pt just woke from a nap and was starting to eat his lunch.  Declined any additional therapy today even when offered after eating lunch.  Will follow-up as able.       Third session (returned due to refusal):  Pt agreed to exercise and mobility.  Pt was in bed at arrival. Bed mobility performed independently.  Transfers to standing independently.  SBA for household distances of ambulation with postural cues.   Ballet bar therex: high marching, HR, hip abduction, squats x 20 each B;  gastroc stretch 3x30\" B.  Seated horizontal abd and B scaption with 1# wts x 20 each, manual thoracic and pectoral stretching 
Mobility:  No w/c mobility completed on this date.  Comments:  Balance:  Static Sitting Balance: good: independent with functional balance in unsupported position  Dynamic Sitting Balance: good: independent with functional balance in unsupported position  Static Standing Balance: fair: maintains balance at CGA without use of UE support  Dynamic Standing Balance: fair: maintains balance at CGA without use of UE support  Comments:    Other Therapeutic Interventions    When toileting, pt stood to urinate. Pt was able to lower and then pull up his depends/pants from floor level without LOB at CGA.  Pt also cleaned the toilet following usage and maintained balance while leaning fwd to reach the commode.     -Seated and standing mid rows with black theratubing 2x10 Reps (tc to squeeze shoulder blades and for more upright posture).   -Standing with back against the wall cueing for pt to rest upper back/shoulders and head against wall to improve posture.   -While in the above position, pt performed BUE horizontal abduction x 15, and B shoulder abduction along the wall to an OH position x 10.      Second session:  Pt seated in chair at arrival.  Stating he needed to use the bathroom.  Pt donned shoes seated in chair with supervision.  STS transfers with SBA from chair and toilet. Pt managed his own pericare seated and clothing in standing with SBA.    Pt refusing any additional therapy stating he \"already exercised today, did that today.\" Pt insisted on returning to bed (with supervision).  Pt not agreeable to any other form of activity even in bed(stretching, exercise).    Needs in reach.     Re-attempt at 1530.  Pt was upright in bed eating Oreos.  Declined any additional mobility. Stated he was just up to the bathroom.  Will attempt to make up missed minutes another day.     Functional Outcomes                 Cognition  Comments: difficult to fully assess cognition;  pt followed most commands appropriately for PT with 
lobe ischemic stroke  - PT/OT/SLP  - ASA     HTN  New onset systolic heart failure, EF 30-35% due to nonischemic cardiomyopathy  - Metoprolol  - Entresto   - Eplerenone  - Jardiance     Allergies  Cetirizine     HLD  -Atorvastatin     T2DM  -Jardiance     Alzheimer's Dementia  Insomina  - melatonin  - Seroquel 25 mg qhs PRN for agitation     #. Prerenal azotemia  - Encourage PO fluid intake     FEN  -Multivitamin     #. GERD  - PPI    Bladder   -High risk retention   -Monitor PVRs, SC prn >300cc     Bowel   -High risk constipation   -senna+colace BID, PRN miralax, MoM, and bisacodyl supp.     Safety   -fall precautions     Pain control  -Tylenol PRN     PPx  -DVT: lovenox     FULL CODE    Interdisciplinary team conference was held today with entire rehab treatment team including PT, OT, SLP (if applicable), Dietician, RN, and SW. Discussion focused on progress toward rehab goals and discharge planning. Making progress. Overall ambulating 350' CGA w/o device, ADLs w/ set-up/supervision, limited by severe cognitive deficits. Barriers include cognitive deficits. We as a medical team, and I as the physician , made a plan to work on these barriers to facilitate safe discharge. Plan will be presented to patient/family (if available).     Jorge Luis Draper MD 4/16/2024, 5:18 PM    * This document was created using dictation software.  While all precautions were taken to ensure accuracy, errors may have occurred.  Please disregard any typographical errors.      
urgency to use commode at EOS- pt wiped off commode prior to toileting. PCA notified and pt left with PCA present in room for pt to complete toileting needs secondary to time constraints.         Cognition  Overall Cognitive Status: Impaired  Arousal/Alertness: inconsistent responses to stimuli  Following Commands: inconsistently follows commands  Attention Span: attends with cues to redirect, difficulty attending to directions  Safety Judgement: decreased awareness of need for assistance, decreased awareness of need for safety  Insights: not aware of deficits  Initiation: requires cues for some  Sequencing: requires cues for some  Comments: pt aphasic and with dysarthric speech; limited verbalizations w/ exception of when pt expressing wants/needs (pt able to clearly ask \"where is my other pillow\" when getting back in bed however would not verbally respond except for 2-times when asked direct questions; in 2nd session pt verbalized needs 3 times total, remainder of session w/ head nods.     Orientation:    Unable to assess d/t cog/language- pt did not verbalize within session/would not respond when questioned    Command Following:   impaired     Education  Barriers To Learning: cognition and language  Patient Education: patient educated on goals, OT role and benefits, plan of care, ADL adaptive strategies, energy conservation, orientation, transfer training, discharge recommendations  Learning Assessment:  patient will require reinforcement due to cognitive deficits, patient is not an independent learner    Assessment  Activity Tolerance: pt tolerates past sessions well when actively participating; pt refused therapy in AM session following toileting  Impairments Requiring Therapeutic Intervention: decreased functional mobility, decreased ADL status, decreased safety awareness, decreased cognition, decreased endurance, decreased balance, decreased coordination  Prognosis: fair  Clinical Assessment: Pt is a 73 year 
Monitoring , Patient/Family Education , To be determined  Expressive/ Receptive Language intervention , Cognitive-Linguistic intervention , Compensatory Cognitive intervention , and Patient/ Family education     Discharge  Barriers to discharge: Inability to effectively communicate in emergent situations (ex: calling 911, stating name, reduced intelligibility, etc)  Inability to communicate or demonstrate problem solving due to cognitive-communicative impairment  Severity of cognition (mild, mod, severe) with reduced insight negatively impacting safety/independence  Discharge Recommendations: 24 hour supervision and TBD   Continued SLP at Discharge: TBD based upon progress / pt's willingness to engage and work with speech therapy     Goals  Patient Goals: Pt did not state when asked   Time Frame: 5-7 days     Pt will tolerate recommended diet and advanced trials with no overt s/s of aspiration.   Pt will complete auditory comprehension tasks with >70% accuracy, given 1 repetition as needed.   Pt will complete expressive language tasks with >70% accuracy, min cues.   Pt will verbalize basic information (personal history etc.) with 60% accuracy given min cues using any communication modality.  Pt will improve responsiveness to stimulation to better communicate basic wants/needs and reduce caregiver burden.       Therapy Session Time      Session 1 Session 2   Time In 0800 0930   Time Out 0830 1000   Time Code Minutes 0 15   Individual Minutes 30 30     Timed Code Treatment Minutes:  15  Total Treatment Minutes:  60     Electronically Signed By:  Chilango Chowdhury M.A., CCC-SLP [id number]  Speech-Language Pathologist            
impairment  Severity of cognition (mild, mod, severe) with reduced insight negatively impacting safety/independence  Discharge Recommendations: 24 hour supervision and TBD   Continued SLP at Discharge: TBD based upon progress / pt's willingness to engage and work with speech therapy     Goals  Patient Goals: Pt did not state when asked   Time Frame: 5-7 days     Pt will tolerate recommended diet and advanced trials with no overt s/s of aspiration.   Pt will complete auditory comprehension tasks with >70% accuracy, given 1 repetition as needed.   Pt will complete expressive language tasks with >70% accuracy, min cues.   Pt will verbalize basic information (personal history etc.) with 60% accuracy given min cues using any communication modality.  Pt will improve responsiveness to stimulation to better communicate basic wants/needs and reduce caregiver burden.       Therapy Session Time      Session 1 Session 2   Time In 0900 1245   Time Out 0915 1330    Time Code Minutes 0 0   Individual Minutes 15 45     Timed Code Treatment Minutes:  0   Total Treatment Minutes:  60     Electronically Signed By:  Regla Pace M.A. CCC-SLP #[id number] 4/16/2024 3:22 PM  Speech-Language Pathologist          
cueing, repetition and written choices of 2. No family present during evaluation to provide PLOF. Per previous SLP evaluation completed in acute setting on 4/10/24 'pt was able to follow simple commands and respond accurately to simple yes/no questions. Expressive language appeared grossly intact for communication of simple wants/needs. Pt able to complete automatic speech tasks given initiation cue, able to complete responsive naming tasks.' This date pt able to state full name provided with written supports, disoriented to place given written f/2. Initially was accurate with completing basic confrontational language tasks but then would not respond. Provided both formal/ informal measures due to limited verbal output and responses. Unable to fully evaluate motor speech due to minimal verbal output throughout assessment but given minimal assessment pt presents with mild motor speech deficits characterized by blended word boundaries, and Velopharyngeal Insufficiency , resulting in decreased articulatory precision. When pt did verbalize he was voicing his basic wants/ needs \"I thought I saw a spoon, there's no more sugar, my back itches.\" Will contact family to get PLOF in hopes to enhance communication back to baseline level to decrease caregiver burden at d/c. Pt currently on a modified diet (soft and bite sized) due to prolonged oral phase, will assess tolerance of regular solids to ensure tolerance back to baseline diet.     Diet Solids Recommendation:   Liquid Consistency Recommendation:   Recommended Form of Meds:   Dysphagia III Soft and bite sized     Thin liquids     Meds whole with water or Meds in puree        Recommended Compensatory Swallowing Strategies: Alternate solids/liquids , Upright as possible with all PO intake , Small bites/sips , Eat/feed slowly, Remain upright 30-45 min , To be determined       Plan  Frequency: 60 minutes/day; 5 days per week, as tolerated, until goals met, or discharged from

## 2024-04-19 NOTE — CARE COORDINATION
Case Management -  Discharge Note      Patient Name: Maximo Camacho                   YOB: 1950            Readmission Risk (Low < 19, Mod (19-27), High > 27): Readmission Risk Score: 15.1    Current PCP: System, Referring Not In (Inactive)    (IMM) Important Message from Medicare:    Date: 04/19/2024    PT AM-PAC:   /24  OT AM-PAC:   /24    Patient/patient representative has been educated on the benefits of Home Health Care as well as the possible risks of declining recommended services. Patient/patient representative has acknowledged the information provided and decided on the following discharge plan. Patient/ patient representative has been provided freedom of choice regarding service provider, supported by basic dialogue that supports the patient's individualized plan of care/goals.    Home Care Information:   Home Care Agency:   [facility]  [address],   [place] [address]  Phone: [phone]  Fax: [phone]             Services: PT/OT/RN  Home Health Order Obtained: Yes.     Home health agency notified of discharge.      Financial    Payor: [employer] / Plan: [employer] / Product Type: *No Product type* /     Pharmacy:  Potential assistance Purchasing Medications:    Meds-to-Beds request:        [id number] - [place], [place] - [address] - P [phone] - F [phone]  [address]  [facility] [address]  Phone: [phone] Fax: [phone]      Notes:    Additional Case Management Notes: The [facility] called and spoke with Destinee with [facility] and informed her the patient will be discharging home on 04/19/2024.    Electronically signed by BURTON De Leon on 4/19/2024 at 8:47 AM

## 2024-04-19 NOTE — CARE COORDINATION
04/19/24 0839   IMM Letter   IMM Letter given to Patient/Family/Significant other/Guardian/POA/by: Verbally explained to the wife  via phone. The patient's wife is agreeable to discharge.   IMM Letter date given: 04/19/24   IMM Letter time given: 0840

## 2024-04-19 NOTE — DISCHARGE INSTR - COC
Continuity of Care Form    Patient Name: Maximo Camacho   :  1950  MRN:  [id number]    Admit date:  2024  Discharge date:  2024    Code Status Order: Full Code   Advance Directives:     Admitting Physician:  Jorge Luis Draper MD  PCP: System, Referring Not In (Inactive)    Discharging Nurse: MC Levi  Discharging Hospital Unit/Room#: ARU-4907/4907-01  Discharging Unit Phone Number: [phone]    Emergency Contact:   Extended Emergency Contact Information  Primary Emergency Contact: Melissa Camacho  Address: [address] [place] [address] [facility]  Home Phone: [phone]  Relation: Spouse  Secondary Emergency Contact: Eagle Mckeon  Home Phone: [phone]  Relation: Child    Past Surgical History:  Past Surgical History:   Procedure Laterality Date    COLONOSCOPY      UPPER GASTROINTESTINAL ENDOSCOPY  08/15/2016    UPPER GASTROINTESTINAL ENDOSCOPY N/A 2021    EGD BIOPSY performed by Keanu Santana MD at [facility]       Immunization History:   Immunization History   Administered Date(s) Administered    COVID-19, MODERNA Bivalent, (age 12y+), IM, 50 mcg/0.5 mL 2022    Influenza Virus Vaccine 10/23/2014    Pneumococcal, PPSV23, PNEUMOVAX 23, (age 2y+), SC/IM, 0.5mL 10/23/2014       Active Problems:  Patient Active Problem List   Diagnosis Code    Remote history of stroke Z86.73    TBI (traumatic brain injury) ([facility]) S06.9XAA    Partial deafness H91.90    Cerebral infarction (HCC) I63.9    Dilated aortic root ([facility]) I77.810    UGIB (upper gastrointestinal bleed) K92.2    Anemia associated with acute blood loss D62    Allergic rhinitis J30.9    Chronic abdominal pain R10.9, G89.29    Encephalopathy, metabolic G93.41    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

## 2024-04-19 NOTE — PROGRESS NOTES
Hospitalist Progress Note      PCP: System, Referring Not In (Inactive)    Date of Admission: 4/9/2024    LOS: 3    Chief Complaint:   Chief Complaint   Patient presents with    Altered Mental Status     Pt brought in by [facility] from home. Pt was found by warren, standing in the laundry room. Pt was supposed to be in bed. EMS states pt is leaning hard to the left when walking. Pt has past hx of strokes. Pt alert but disoriented.        Case Summary:   73-year-old gentleman with history of Alzheimer's dementia, history of CVA, hypertension, hyperlipidemia, TBI, GERD who presented with acute confusion and wandering around concerning for worsening dementia was found to have a frontal stroke  MRI brain noted for acute infarct within the anterior aspect of the right frontal lobe and cortex of the right pre and postcentral gyri and adjacent right parietal lobe      Active Hospital Problems    Diagnosis Date Noted    Remote from health care [Z75.3] 04/11/2024    Acute encephalopathy [G93.40] 04/09/2024    Arterial ischemic stroke, ICA, right, acute (HCC) [I63.231] 04/09/2024    Dementia due to Alzheimer's disease ([facility]) [G30.9, F02.80]     HTN (hypertension), benign [I10]     Partial deafness [H91.90] 10/22/2014         Principal Problem:  Nonsustained VT: Patient had episodes of nonsustained VT last night.  Remained stable.  He was recently at the  and echocardiogram at that time noted with a EF of 30 to 35%.  Stress test at  3/11/2024 with no evidence of ischemia.  -Cardiology consult  -Check magnesium      CVA (cerebral vascular accident) (HCC): MRI brain noted  - Continue aspirin and statin  - Neurology following with recommendation  - Await ARU consult for possible rehab      Acute encephalopathy with underlying dementia and hearing impairment: Confounded by hearing impairment.  Stable.  Will monitor    Active Problems:    Dementia due to Alzheimer's disease ([facility]): Stable.  Noncombative.    HTN 
      Hospitalist Progress Note      PCP: System, Referring Not In (Inactive)    Date of Admission: 4/9/2024    LOS: 4    Chief Complaint:   Chief Complaint   Patient presents with    Altered Mental Status     Pt brought in by [facility] from home. Pt was found by niece, standing in the laundry room. Pt was supposed to be in bed. EMS states pt is leaning hard to the left when walking. Pt has past hx of strokes. Pt alert but disoriented.        Case Summary:   73-year-old gentleman with history of Alzheimer's dementia, history of CVA, hypertension, hyperlipidemia, TBI, GERD who presented with acute confusion and wandering around concerning for worsening dementia was found to have a frontal stroke  MRI brain noted for acute infarct within the anterior aspect of the right frontal lobe and cortex of the right pre and postcentral gyri and adjacent right parietal lobe  Admission course complicated by nonsustained VT.      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  He also is reported to have had a short run of wide-complex tachycardia.  He is asymptomatic.  Patient had been discharged on Holter monitor from  and results not available.  Family has not returned the Holter monitor yet.  They were recommended to send the Holter monitor back for further analysis.  He was recently at the  and echocardiogram at that time noted with a EF of 30 to 35%.  Stress test at  3/11/2024 with no evidence of ischemia.  -Continue metoprolol and heart 
    Maximo Camacho  Neurology Follow-up  [facility] Neurology    Date of Service: 4/10/2024    Subjective:   CC: Follow up today regarding: Acute confusion and stroke    Events noted. Chart and lab reviewed.  Patient seen this morning, appears lethargic.  Was given Ativan this morning due to agitation overnight.  He arouses to his name, but eyes closed immediately.  Blood pressure is elevated, systolic 180s.  Review of system is limited from the patient.      ROS : A 10-12 system review could not be obtained due to poor cooperation and confusion.       I personally reviewed and updated social history, past medical history, medications, allergy, surgical history, and family history as documented in the patient's electronic health records.          Objective:  Constitutional:   Vitals:    04/10/24 0315 04/10/24 0700 04/10/24 0722 04/10/24 0930   BP: (!) 150/77 (!) 154/94  (!) 186/87   Pulse: 72 75  78   Resp: 18 18  18   Temp: 97.6 °F (36.4 °C) 97.6 °F (36.4 °C)  97.5 °F (36.4 °C)   TempSrc: Oral Axillary  Oral   SpO2: 100% 98%  99%   Weight:   64.5 kg (142 lb 3.2 oz)    Height:           General appearance: Lethargic, appears in no acute distress  Mental Status:   AAO times one.   Attention and concentration: poor, waxing and waning.  Eyes open to name close immediately  Language: Mild dysarthric speech, can follow some directions but not consistently.  Recent memory: Impaired  Remote memory: Impaired   Poor fund of knowledge  Cranial Nerves:   II: Pupils: equal, round, reactive to light  III,IV,VI: No gaze preference. No nystagmus  V: Facial sensation: Grossly unremarkable.  VII: Facial strength and movements: intact and symmetric  XII: Tongue movements : midline  Musculoskeletal:  The patient can move all 4 extremities.     Tone: Normal tone.  Reflexes: Diminished in both arms and legs  Coordination: No abnormal movements, no tremors  Sensation: No sensory deficit  Gait: Cannot be examined due to 
  [facility] - Inpatient Rehabilitation Department   Phone: [phone]    Physical Therapy    [x] Initial Evaluation            [] Daily Treatment Note         [] Discharge Summary      Patient: Maximo Camacho   : 1950   MRN: [id number]   Date of Service:  4/10/2024  Admitting Diagnosis: CVA (cerebral vascular accident) (HCC)  Current Admission Summary: 73 y.o. male presented to [facility] for altered mental status. Pt was wandering around in home at night and was found by niece. Per H&P  \"EMS states pt is leaning hard to the left when walking. Pt has past hx of strokes. Pt alert but disoriented.\" Per MRI : Acute infarcts within the anterior aspect of the right frontal lobe and cortex of the right pre and postcentral gyri and adjacent right parietal lobe.  Past Medical History:  has a past medical history of Allergic rhinitis, Cerebral artery occlusion with cerebral infarction (HCC), Dementia (HCC), Hyperlipidemia, Hypertension, Injury brain, traumatic (HCC), and Polyp, stomach.  Past Surgical History:  has a past surgical history that includes Colonoscopy; Upper gastrointestinal endoscopy (08/15/2016); and Upper gastrointestinal endoscopy (N/A, 2021).    Discharge Recommendations: Maximo Camacho scored a  on the AM-PAC short mobility form. Current research shows that an AM-PAC score of 18 or greater is typically associated with a discharge to the patient's home setting. Based on the patient's AM-PAC score and their current functional mobility deficits, it is recommended that the patient have 2-3 sessions per week of Physical Therapy at d/c to increase the patient's independence.  At this time, this patient demonstrates the endurance and safety to discharge home with home and a follow up treatment frequency of 2-3x/wk.  Please see assessment section for further patient specific details.    If patient discharges prior to next session this note will serve as a discharge summary.  Please 
  Physician Progress Note      PATIENT:               NICOLA NAQVI  CSN #:                  [id number]  :                       1950  ADMIT DATE:       2024 1:24 AM  DISCH DATE:        2024 6:28 PM  RESPONDING  PROVIDER #:        Maricruz Poe MD          QUERY TEXT:    Pt admitted with CVA and has Acute encephalopathy documented in H&P, IM PN   4/10-13. If possible, please document in progress notes and discharge summary   further specificity regarding the type of encephalopathy:    The medical record reflects the following:  Risk Factors: CVA, History of metabolic encephalopathy  Clinical Indicators: H&P, IM PN 4/10-13-Acute encephalopathy, Discharge   summary-Acute encephalopathy with underlying dementia and hearing impairment:   Confounded by hearing impairment, CVA, IM PN -Pt alert but disoriented.   Physical Medicine and Rehabilitation PN -Patient confused on exam today.  Treatment: IVF  Options provided:  -- Metabolic encephalopathy  -- Encephalopathy due to CVA  -- Other - I will add my own diagnosis  -- Disagree - Not applicable / Not valid  -- Disagree - Clinically unable to determine / Unknown  -- Refer to Clinical Documentation Reviewer    PROVIDER RESPONSE TEXT:    This patient has encephalopathy due to CVA.    Query created by: Joy Stroud on 2024 2:30 AM      Electronically signed by:  Maricruz Poe MD 2024 9:40 AM          
  [facility] - Inpatient Rehabilitation Department   Phone: [phone]    Occupational Therapy    [] Initial Evaluation            [] Daily Treatment Note         [] Discharge Summary      Patient: Maximo Camacho   : 1950   MRN: [id number]   Date of Service:  2024    Admitting Diagnosis:  Arterial ischemic stroke, ICA, right, acute (HCC)  Current Admission Summary: The patient is a 73 y.o. male with history of Alzheimer's dementia, history of CVA, hypertension, hyperlipidemia, history of TBI, GERD who was presented with concern for acute confusion found wandering in the house purposelessly.  Patient was disoriented though alert.  According to the wife at bedside at the time of evaluation, patient has been wandering in the house a lot lately and was recently admitted to  for similar presentation.  No cough or production, no fevers or chills.  No infective symptoms.  Chest x-ray with clear lung fields.  Urinalysis negative.  CT brain with CT angiogram of the head and neck noted for no acute pathology    MRI Brain : 1. Acute infarcts within the anterior aspect of the right frontal lobe and   cortex of the right pre and postcentral gyri and adjacent right parietal lobe.     Past Medical History:  has a past medical history of Allergic rhinitis, Cerebral artery occlusion with cerebral infarction (HCC), Dementia (HCC), Hyperlipidemia, Hypertension, Injury brain, traumatic (HCC), and Polyp, stomach.  Past Surgical History:  has a past surgical history that includes Colonoscopy; Upper gastrointestinal endoscopy (08/15/2016); and Upper gastrointestinal endoscopy (N/A, 2021).    Discharge Recommendations: Maximo Camacho scored a 17/24 on the AM-PAC ADL Inpatient form. Current research shows that an AM-PAC score of 17 or less is typically not associated with a discharge to the patient's home setting. Based on the patient's AM-PAC score and their current ADL deficits, it is recommended that 
  [facility] - Inpatient Rehabilitation Department   Phone: [phone]    Occupational Therapy    [x] Initial Evaluation            [] Daily Treatment Note         [] Discharge Summary      Patient: Maximo Camacho   : 1950   MRN: [id number]   Date of Service:  4/10/2024    Admitting Diagnosis:  CVA (cerebral vascular accident) (HCC)  Current Admission Summary: The patient is a 73 y.o. male with history of Alzheimer's dementia, history of CVA, hypertension, hyperlipidemia, history of TBI, GERD who was presented with concern for acute confusion found wandering in the house purposelessly.  Patient was disoriented though alert.  According to the wife at bedside at the time of evaluation, patient has been wandering in the house a lot lately and was recently admitted to  for similar presentation.  No cough or production, no fevers or chills.  No infective symptoms.  Chest x-ray with clear lung fields.  Urinalysis negative.  CT brain with CT angiogram of the head and neck noted for no acute pathology  Past Medical History:  has a past medical history of Allergic rhinitis, Cerebral artery occlusion with cerebral infarction (HCC), Dementia (HCC), Hyperlipidemia, Hypertension, Injury brain, traumatic (HCC), and Polyp, stomach.  Past Surgical History:  has a past surgical history that includes Colonoscopy; Upper gastrointestinal endoscopy (08/15/2016); and Upper gastrointestinal endoscopy (N/A, 2021).    Discharge Recommendations: Maximo Camacho scored a 14/24 on the AM-PAC ADL Inpatient form. Current research shows that an AM-PAC score of 18 or greater is typically associated with a discharge to the patient's home setting. Based on the patient's AM-PAC score, and their current ADL deficits, it is recommended that the patient have 2-3 sessions per week of Occupational Therapy at d/c to increase the patient's independence.  At this time, this patient demonstrates the endurance and safety to discharge 
  [facility] - Inpatient Rehabilitation Department   Phone: [phone]    Occupational Therapy    [x] Initial Evaluation            [] Daily Treatment Note         [] Discharge Summary      Patient: Maximo Camacho   : 1950   MRN: [id number]   Date of Service:  2024    Admitting Diagnosis:  Arterial ischemic stroke, ICA, right, acute (HCC)  Current Admission Summary: The patient is a 73 y.o. male with history of Alzheimer's dementia, history of CVA, hypertension, hyperlipidemia, history of TBI, GERD who was presented with concern for acute confusion found wandering in the house purposelessly.  Patient was disoriented though alert.  According to the wife at bedside at the time of evaluation, patient has been wandering in the house a lot lately and was recently admitted to  for similar presentation.  No cough or production, no fevers or chills.  No infective symptoms.  Chest x-ray with clear lung fields.  Urinalysis negative.  CT brain with CT angiogram of the head and neck noted for no acute pathology    MRI Brain : 1. Acute infarcts within the anterior aspect of the right frontal lobe and   cortex of the right pre and postcentral gyri and adjacent right parietal lobe.     Past Medical History:  has a past medical history of Allergic rhinitis, Cerebral artery occlusion with cerebral infarction (HCC), Dementia (HCC), Hyperlipidemia, Hypertension, Injury brain, traumatic (HCC), and Polyp, stomach.  Past Surgical History:  has a past surgical history that includes Colonoscopy; Upper gastrointestinal endoscopy (08/15/2016); and Upper gastrointestinal endoscopy (N/A, 2021).    Discharge Recommendations: Maximo Camacho scored a 16/24 on the AM-PAC ADL Inpatient form. Current research shows that an AM-PAC score of 18 or greater is typically associated with a discharge to the patient's home setting. Based on the patient's AM-PAC score, and their current ADL deficits, it is recommended that 
Facility/Department: [address]  Speech Language Pathology   Dysphagia and Speech Language/Cognitive Treatment Note    Patient: Maximo Camacho   : 1950   MRN: [id number]      Evaluation Date: 2024      Admitting Dx: Acute encephalopathy [G93.40]  Frequent falls [R29.6]  Treatment Diagnosis: Receptive Aphasia ,  Dysarthria , Cognitive-Linguistic Deficits , Oropharyngeal Dysphagia   Pain: Did not state                                                Subjective:  Pt with increased alertness compared to previous date.    Dysphagia Treatment:   Diet and Treatment Recommendations 2024:  Diet Solids Recommendation:  Dysphagia III Soft and bite sized  Liquid Consistency Recommendation:  Thin liquids  Recommended form of Meds: Meds in puree       Compensatory strategies: Upright as possible with all PO intake , Small bites/sips , Eat/feed slowly, Remain upright 30-45 min     Assessment of Texture Tolerance:  Diet level prior to treatment: Dysphagia II Minced and Moist , Thin liquids   Tolerance of Current Diet Level:RN reported pt appears to be tolerating current diet level      -Impressions: Pt was positioned Upright in bed , awake and alert. Currently on room air. Trials of thin liquids, puree , and soft solids were provided to assess swallow function. Pt's lunch tray is in his room with less than 50% of the meal eaten with pt reporting that he does not like the current texture of his food. Oral phase characterized by adequate labial seal, prolonged mastication, functional AP transit, suspect premature bolus loss with thins. Pt demonstrating decreased oral holding compared to previous date. Pharyngeal phase characterized by suspected delayed swallow initiation. Pt demonstrated delayed cough following thins x 1. Pt demonstrates increased risk for aspiration due to cognitive state , co morbidities , new CVA , and advanced age . Based on today's assessment recommend Dysphagia III Soft and bite 
Facility/Department: [address]  SLP Clinical Swallow Evaluation and Speech Language Cognitive Assessment     Patient: Maximo Camacho   : 1950   MRN: [id number]      Evaluation Date: 4/10/2024      Admitting Dx: Acute encephalopathy [G93.40]  Frequent falls [R29.6]  Pain: Did not state                                  H&P: The patient is a 73 y.o. male with history of Alzheimer's dementia, history of CVA, hypertension, hyperlipidemia, history of TBI, GERD who was presented with concern for acute confusion found wandering in the house purposelessly.  Patient was disoriented though alert.  According to the wife at bedside at the time of evaluation, patient has been wandering in the house a lot lately and was recently admitted to  for similar presentation.  No cough or production, no fevers or chills.  No infective symptoms.  Chest x-ray with clear lung fields.  Urinalysis negative.  CT brain with CT angiogram of the head and neck noted for no acute pathology    Imaging:  Chest X-ray: (completed 24)  No acute cardiopulmonary process.    MRI:(completed 24)  1. Acute infarcts within the anterior aspect of the right frontal lobe and  cortex of the right pre and postcentral gyri and adjacent right parietal lobe.  2. No acute intracranial hemorrhage.  3. Severe chronic small vessel ischemic changes.  4. Remote encephalomalacia within the left temporal lobe.  The findings were sent to the Radiology Results [facility] at 11:13  am on 2024 to be communicated to a licensed caregiver.         History/Prior Level of Function:   Living Status: Private residence   Prior Dysphagia History: Pt seen at this hospital 2021, recommended regular texture diet with thin liquids and meds in puree.  Prior Speech History: Pt evaluated at [facility] 3/10/24, diagnosed with severe cognitive-linguistic deficits but discharged due to poor prognosis.   Reason for referral: SLP evaluation orders received due 
[facility]   Electrophysiology Nurse Practitioner  Pre-Consult Rounding    Date: 4/12/2024  Date of admission: 4/9/2024  1:24 AM  Reason for Admission: Acute encephalopathy [G93.40]  Frequent falls [R29.6]  Consult Requesting Physician: Maricruz Poe MD   Reason for Consult: NSVT    Maximo Camacho is a 73 y.o. male presented to hospital with concern for increased confusion by family. He was found wandering in the laundry room by his niece. EP was consult for an 18 beat run of wide complex tachycardia.     Past medical history: CVA, TBI (2017), HTN, HLD, recurrent falls  and dementia.    Recent admission at  for new onset heart failure 3/7/2024    Telemetry: NSR, rate 78    ECG: NSR    SHANELLE:  3/8/24   Left ventricle: The cavity size is normal. Wall thickness was increased in     a pattern of mild LVH. Systolic function is moderately to severely     reduced. The estimated ejection fraction is 30-35%. There is diffuse     hypokinesis. Cannot assess LV diastolic function. There is no evidence of     a thrombus revealed by acoustic contrast opacification   - Aortic valve: There is moderate regurgitation directed eccentrically in     the LVOT.   - Aortic root: The root is severely dilated and 5.1cm diameter.   - Ascending aorta: The vessel is dilated and 4.1cm diameter. There is an     echodensity in the ascending aorta most consistent with shadow artifact.   - Descending aorta: The vessel is dilated.   - Right ventricle: The cavity size is normal. Systolic function is low     normal by visual assessment.   - Atrial septum: Agitated saline contrast study at baseline or with     provocation, shows no right-to-left atrial level shunt.   - Pulmonary arteries: Systolic pressure could not be accurately estimated.   - Inferior vena cava: The IVC is poorly visualized, appears normal in size.     Impressions:  Reduced LV systolic function with severely dilated aortic root   and dilated ascending aorta. 
Maximo Camacho  4/13/2024  [id number]    Chief Complaint: Arterial ischemic stroke, ICA, right, acute (HCC)    Subjective   HPI: 73-year-old gentleman with history of Alzheimer's dementia, history of CVA, hypertension, hyperlipidemia, TBI, GERD who presented with acute confusion and wandering around concerning for worsening dementia .  Workup after admission revealed patient to have a frontal stroke. MRI brain noted for acute infarct within the anterior aspect of the right frontal lobe and cortex of the right pre and postcentral gyri and adjacent right parietal lobe.  Patient seen by neurology, and patient noted to have acute encephalopathy with hospital-acquired delirium.  Patient confused on exam today.  Patient started in therapies, and needs mod assist for transfers and gait of 15 feet.  Patient limited by his dementia with therapies.  Patient lives at home with his spouse in a 1 level house, and was independent in self-care, gait, and ambulation prior to admission..  Patient has [employer] [place] insurance.       Past Medical History:   Diagnosis Date    Allergic rhinitis     Cerebral artery occlusion with cerebral infarction (HCC) 2016    Dementia (HCC)     Hyperlipidemia     Hypertension     Injury brain, traumatic (HCC)     Polyp, stomach        Past Surgical History:   Procedure Laterality Date    COLONOSCOPY      UPPER GASTROINTESTINAL ENDOSCOPY  08/15/2016    UPPER GASTROINTESTINAL ENDOSCOPY N/A 6/1/2021    EGD BIOPSY performed by Keanu Santana MD at [facility] ENDOSCOPY       Social History     Tobacco Use    Smoking status: Never    Smokeless tobacco: Never   Vaping Use    Vaping Use: Never used   Substance Use Topics    Alcohol use: No    Drug use: No          Objective   Objective:  Patient Vitals for the past 24 hrs:   BP Temp Temp src Pulse Resp SpO2 Weight   04/13/24 2000 136/69 98.1 °F (36.7 °C) Oral 72 18 98 % --   04/13/24 1620 127/72 98 °F (36.7 °C) Oral 70 18 97 % --   04/13/24 1146 104/64 97.7 
Per Dr Poe notes patient does not need a repeated Echo, secure message sent to see if it can be discontinued. Also patient still has no IV access, not sure if access with be obtained or not. Patient has poor veins to access and PICC nurse and DIT unable to get an IV started. Patient would need a central line which would need to be done per MD.    Dr Poe agreed to discontinue Echo due to recently having one, no need for IV access, all IV orders discontinued. New order for inpatient consult for ARU, if they do not accept patient will go home with services.   
Pt became increasingly agitated; attempted to get out of bed. Pt unsteady while attempting to stand. Pt would not listen when asked to sit back down as to not fall. NP notified. New order for one time lorazepam.     0645: Lorazepam effective. Pt asleep and not agitated. Will continue to monitor.   
Shift assessment completed. Routine vitals stable. Scheduled medications given. Patient is awake, alert and eating breakfast. Respirations are easy and unlabored. Patient does not appear to be in distress, resting comfortably at this time. Call light within reach. Patient impulsive and forgetful and bed alarm on.     
Shift assessment completed. Routine vitals stable. Scheduled medications given. Patient is awake, alert and oriented. Respirations are easy and unlabored. Patient does not appear to be in distress, resting comfortably at this time. Call light within reach.    
Unknown how long he went to school.  He attended a special school due to a learning disability.    Patient has communication issues and mumbles in response to questions.  Has difficulty understanding current phrases and terminology.      Family verbalized OCD tendencies.    Patient does not eat port or pork products.    Impulsive.  Per son, if he wants to get up, he will get up and is not able to use current technology such as call light.    Patient seen in ED, room 01.  Admission completed with the following exceptions:  4 Eyes Assessment, Immunizations, Vaccines, Rights and Responsibilities, Orientation to room, Plan of Care, Education/Learning Assessment and Education Plan, white board, height and weight, pain assessment and head to toe assessment.  Patient is alert but not oriented and unable to be reorient.  Patient lives in a single level house with his wife and is being admitted for Acute Encephalopathy.  Home Medications as well as Outside Sources have been verbally reviewed with his wife, she brought in the bottles of medications he is currently taking and the med rec was updated.  Medication reconciliation is now Completed.  All questions answered.      
  Component Value Date/Time     04/11/2024 04:32 AM    K 4.0 04/11/2024 04:32 AM     04/11/2024 04:32 AM    CO2 26 04/11/2024 04:32 AM    BUN 16 04/11/2024 04:32 AM    CREATININE 0.9 04/11/2024 04:32 AM    GFRAA >60 04/30/2021 04:30 AM    LABGLOM 89 04/11/2024 04:32 AM    GLUCOSE 96 04/11/2024 04:32 AM    CALCIUM 9.0 04/11/2024 04:32 AM     Lab Results   Component Value Date/Time    WBC 5.5 04/11/2024 04:32 AM    RBC 3.93 04/11/2024 04:32 AM    HGB 12.3 04/11/2024 04:32 AM    HCT 35.6 04/11/2024 04:32 AM    MCV 90.5 04/11/2024 04:32 AM    RDW 14.1 04/11/2024 04:32 AM     04/11/2024 04:32 AM     Lab Results   Component Value Date    INR 1.12 04/29/2021    PROTIME 13.0 04/29/2021         Medical decision making:      A. Problems (any 1)    High:    [x] Acute/Chronic Illness/injury posing threat to life or bodily function: Acute stroke  [] Severe exacerbation of chronic illness:      Moderate:    []     1 or more chronic illness with exacerbation, progression or side effect of treatment or  []     2 or more stable chronic illnesses or  []     1 acute illness with systemic symptoms     ---------------------------------------------------------------------  B. Risk of Treatment (any 1)   [] Drugs/treatments that require intensive monitoring for toxicity include:    [] Change in code status:    [] Decision to escalate care:    [] Major surgery/procedure with associated risk factors:    [x] Prescription drug management  ----------------------------------------------------------------------  [x] High (any 2)  [] Moderate (any 1)    C. Data (any 2 for high and any 1 for moderate)  [x] Discussed management of the case with: Primary team  [] Imaging personally reviewed and interpreted, includes:    [x] Data Review (any 3)  [x] Collateral history obtained from: Primary team  [x] All available Consultant notes from yesterday/today were reviewed  [x] All current labs were reviewed and interpreted for clinical 
**OR** potassium alternative oral replacement **OR** potassium chloride, promethazine **OR** ondansetron, acetaminophen **OR** acetaminophen, polyethylene glycol      DVT Prophylaxis: Subcut enoxaparin  Diet: ADULT ORAL NUTRITION SUPPLEMENT; Lunch, Dinner; Standard High Calorie/High Protein Oral Supplement  ADULT DIET; Dysphagia - Minced and Moist  Code Status: Full Code    Dispo: Anticipate discharge in 1 to 2 days.  If declined by ARU, to consider for home with home care    ____________________________________________________________________________    Subjective:   Overnight Events:   Uneventful overnight  No new complaints this morning      Physical Exam:  BP (!) 155/82   Pulse 81   Temp 97.6 °F (36.4 °C) (Oral)   Resp 17   Ht 1.829 m (6')   Wt 63 kg (138 lb 14.2 oz)   SpO2 99%   BMI 18.84 kg/m²   General appearance: No apparent distress, appears stated age and cooperative.  HEENT: Normocephalic, atraumatic, MMM, No sclera icterus/conjuctival palor  Neck: Supple, no thyromegally. No jugular venous distention.   Respiratory:  Normal respiratory effort. Clear to auscultation, no Rales/Wheezes/Rhonchi.  Cardiovascular: S1/S2 without murmurs, rubs or gallops. RRR  Abdomen: Soft, non-tender, non-distended, bowel sounds present.  Musculoskeletal: No clubbing, cyanosis or edema bilaterally.    Skin: Skin color, texture, turgor normal.  No rashes or lesions.  Neurologic:  Cranial nerves: II-XII intact, ELVIS, No focal sensory/motor deficits  Psychiatric: Alert and oriented, thought content appropriate  Capillary Refill: Brisk,< 3 seconds   Peripheral Pulses: +2 palpable, equal bilaterally       Intake/Output Summary (Last 24 hours) at 4/11/2024 1431  Last data filed at 4/11/2024 0913  Gross per 24 hour   Intake 120 ml   Output 1200 ml   Net -1080 ml         Labs:   Recent Labs     04/09/24  0217 04/10/24  0529 04/11/24  0432   WBC 5.7 5.0 5.5   HGB 11.6* 12.0* 12.3*   HCT 34.1* 34.7* 35.6*   * 126* 121* 
Goals:  Pt will functionally tolerate recommended diet with no overt clinical s/s of aspiration (Ongoing 04/12/24)  Pt will advance to least restrictive diet as indicated (Ongoing 04/12/24)      Speech Language/Cognitive Treatment:  Impressions: This date goals were targeted via orientation and auditory comprehension tasks. Pt oriented to self and year. Disoriented to month, place and year. Auditory processing remains delayed although difficult to determine if there is a correlation with pt hearing deficits. He was able to follow 1-step commands given repetition but benefited from a visual model for consistency. He answered simple, general yes/no questions in 3/5 opportunities. Improved accuracy noted with personal/preferential vs general questions. Pt baseline cognitive status is unclear at this time. Based on today's session recommend continued skilled speech intervention targeting communication to promote return to baseline    Speech Language Short Term Goals:  Pt will improve auditory processing/comprehension of commands and questions to 80%, via graded tasks (Ongoing 04/12/24)  Pt will improve orientation via graded tasks to 80% (Ongoing 04/12/24)  Pt will participate in ongoing cognitive assessment with goals to be established as indicated (Ongoing 04/12/24)    Assessment: Patient progressing toward goals    Plan: 3-5 times per week during acute care stay.      Patient/Family Education:  Provided education regarding role of SLP, recommendations and general speech pathology plan of care.   [x] Pt verbalized understanding and agreement   [x] Pt requires ongoing learning   [] No evidence of comprehension     Discharge Recommendations:  Recommend ongoing SLP for speech and dysphagia therapy upon discharge from hospital     Treatment time  Timed Code Treatment Minutes: 0 minutes  Total Treatment time: 23 minutes     If patient discharges prior to next session this note will serve as a discharge summary.  
ambulation path and encouragement for mobility. Patient appears to have awareness of unsteadiness and apprehensive with movement.     Other Therapeutic Interventions    See OT note for details on ADLs completed at the [facility] with CGA-SBA for standing balance     Functional Outcomes  AM-PAC Inpatient Mobility Raw Score : 17              Cognition  Overall Cognitive Status: Impaired  Arousal/Alertness: delayed responses to stimuli, inconsistent responses to stimuli, unresponsive to stimuli  Following Commands: follows one step commands with repetition, follows one step commands with increased time, inconsistently follows commands, does not follow commands  Attention Span: attends with cues to redirect, difficulty attending to directions, unable to maintain attention  Memory: decreased recall of biographical information, decreased recall of recent events, decreased short term memory  Safety Judgement: decreased awareness of need for assistance, decreased awareness of need for safety  Problem Solving: assistance required to generate solutions, assistance required to implement solutions, assistance required to correct errors made  Arousal fluctuating during session--initially difficult to engage during session, improving with family presence and increased time in seated position. Inconsistent command following--appears to have cognitive and hearing elements.   Orientation:    oriented to person, oriented to place, disoriented to time , and disoriented to situation  Command Following:   impaired    Education  Barriers To Learning: cognition  Patient Education: patient educated on goals, PT role and benefits, plan of care, precautions, general safety, functional mobility training, orientation, family education, transfer training, discharge recommendations  Learning Assessment:  patient will require reinforcement due to cognitive deficits, patient is not an independent learner    Assessment  Activity Tolerance: fair-- 
without use of UE support  Dynamic Sitting Balance: fair (+): maintains balance at SBA/supervision without use of UE support  Static Standing Balance: fair: maintains balance at CGA without use of UE support  Dynamic Standing Balance: poor (+): requires min (A) to maintain balance  Comments:     Other Therapeutic Interventions    The first 2 times the PT tried to work with pt and assist up from bed to chair, pt said no and used hands in \"go away\" motion. PT asked a 3rd time for the patient to just get up to the chair and the pt nodded \"yes\". Pt up to chair but would not do anything else. PT talked with nurse about change with D/C recommendations in last 24 hours.     Functional Outcomes  AM-PAC Inpatient Mobility Raw Score : 17              Cognition  Overall Cognitive Status: Impaired  Arousal/Alertness: delayed responses to stimuli, inconsistent responses to stimuli, unresponsive to stimuli  Following Commands: follows one step commands with repetition, follows one step commands with increased time, inconsistently follows commands, does not follow commands  Attention Span: attends with cues to redirect, difficulty attending to directions, unable to maintain attention  Memory: decreased recall of biographical information, decreased recall of recent events, decreased short term memory  Safety Judgement: decreased awareness of need for assistance, decreased awareness of need for safety  Problem Solving: assistance required to generate solutions, assistance required to implement solutions, assistance required to correct errors made    Orientation:    oriented to person, disoriented to place, disoriented to time , and disoriented to situation  Command Following:   impaired    Education  Barriers To Learning: cognition  Patient Education: patient educated on goals, PT role and benefits, plan of care, precautions, general safety, functional mobility training, orientation, family education, transfer training, discharge 
transcribed using voice recognition software. Every effort was made to ensure accuracy, however, inadvertent computerized transcription errors may be present.

## 2024-04-19 NOTE — PLAN OF CARE
Problem: Discharge Planning  Goal: Discharge to home or other facility with appropriate resources  4/16/2024 0920 by Levy Yip RN  Outcome: Progressing     Problem: Safety - Adult  Goal: Free from fall injury  4/16/2024 0920 by Levy Yip RN  Outcome: Progressing     Problem: Pain  Goal: Verbalizes/displays adequate comfort level or baseline comfort level  4/16/2024 0920 by Levy Yip RN  Outcome: Progressing     
  Problem: Discharge Planning  Goal: Discharge to home or other facility with appropriate resources  4/17/2024 1311 by Amita Michelle RN  Outcome: Progressing  Flowsheets (Taken 4/17/2024 1311)  Discharge to home or other facility with appropriate resources:   Identify barriers to discharge with patient and caregiver   Arrange for needed discharge resources and transportation as appropriate   Identify discharge learning needs (meds, wound care, etc)   Arrange for interpreters to assist at discharge as needed  4/16/2024 2339 by Lana Sierra RN  Outcome: Progressing     Problem: Safety - Adult  Goal: Free from fall injury  4/17/2024 1311 by Amita Michelle RN  Outcome: Progressing  Flowsheets (Taken 4/17/2024 1311)  Free From Fall Injury: Instruct family/caregiver on patient safety  4/16/2024 2339 by Lana Sierra RN  Outcome: Progressing     Problem: Pain  Goal: Verbalizes/displays adequate comfort level or baseline comfort level  4/17/2024 1311 by Amita Michelle RN  Outcome: Progressing  Flowsheets (Taken 4/17/2024 1311)  Verbalizes/displays adequate comfort level or baseline comfort level:   Encourage patient to monitor pain and request assistance   Implement non-pharmacological measures as appropriate and evaluate response   Administer analgesics based on type and severity of pain and evaluate response   Assess pain using appropriate pain scale  4/16/2024 2339 by Lana Sierra RN  Outcome: Progressing     Problem: ABCDS Injury Assessment  Goal: Absence of physical injury  4/17/2024 1311 by Amita Michelle RN  Outcome: Progressing  Flowsheets (Taken 4/17/2024 1311)  Absence of Physical Injury: Implement safety measures based on patient assessment  4/16/2024 2339 by Lana Sierra RN  Outcome: Progressing     Problem: Chronic Conditions and Co-morbidities  Goal: Patient's chronic conditions and co-morbidity symptoms are monitored and maintained or improved  4/17/2024 1311 by 
  Problem: Discharge Planning  Goal: Discharge to home or other facility with appropriate resources  4/17/2024 2347 by Lana Sierra RN  Outcome: Progressing     Problem: Safety - Adult  Goal: Free from fall injury  4/17/2024 2347 by Lana Sierra RN  Outcome: Progressing     Problem: ABCDS Injury Assessment  Goal: Absence of physical injury  4/17/2024 2347 by Lana Sierra RN  Outcome: Progressing     Problem: Chronic Conditions and Co-morbidities  Goal: Patient's chronic conditions and co-morbidity symptoms are monitored and maintained or improved  4/17/2024 2347 by Lana Sierra RN  Outcome: Progressing     Problem: Pain  Goal: Verbalizes/displays adequate comfort level or baseline comfort level  4/17/2024 2347 by Lana Sierra RN  Outcome: Adequate for Discharge     
  Problem: Discharge Planning  Goal: Discharge to home or other facility with appropriate resources  4/18/2024 0824 by Nadine Hernandez RN  Outcome: Progressing  4/17/2024 2347 by Lana Sierra RN  Outcome: Progressing     Problem: Safety - Adult  Goal: Free from fall injury  4/18/2024 0824 by Nadine Hernandez RN  Outcome: Progressing  4/17/2024 2347 by Lana Sierra RN  Outcome: Progressing     Problem: Pain  Goal: Verbalizes/displays adequate comfort level or baseline comfort level  4/18/2024 0824 by Nadine Hernandez RN  Outcome: Progressing  4/17/2024 2347 by Lana Sierra RN  Outcome: Adequate for Discharge     Problem: ABCDS Injury Assessment  Goal: Absence of physical injury  4/18/2024 0824 by Nadine Hernandez RN  Outcome: Progressing  4/17/2024 2347 by Lana Sierra RN  Outcome: Progressing     Problem: Chronic Conditions and Co-morbidities  Goal: Patient's chronic conditions and co-morbidity symptoms are monitored and maintained or improved  4/18/2024 0824 by Nadine Hernandez RN  Outcome: Progressing  4/17/2024 2347 by Lana Sierra RN  Outcome: Progressing     
  Problem: Discharge Planning  Goal: Discharge to home or other facility with appropriate resources  4/19/2024 1027 by Amita Michelle RN  Outcome: Adequate for Discharge  Flowsheets (Taken 4/19/2024 1027)  Discharge to home or other facility with appropriate resources:   Identify barriers to discharge with patient and caregiver   Refer to discharge planning if patient needs post-hospital services based on physician order or complex needs related to functional status, cognitive ability or social support system   Identify discharge learning needs (meds, wound care, etc)   Arrange for needed discharge resources and transportation as appropriate  4/19/2024 0139 by Graciela Hudson RN  Outcome: Progressing  Flowsheets (Taken 4/18/2024 2006)  Discharge to home or other facility with appropriate resources: Identify discharge learning needs (meds, wound care, etc)     Problem: Safety - Adult  Goal: Free from fall injury  4/19/2024 1027 by Amita Michelle RN  Outcome: Adequate for Discharge  Flowsheets (Taken 4/19/2024 1027)  Free From Fall Injury: Instruct family/caregiver on patient safety  4/19/2024 0139 by Graciela Hudson RN  Outcome: Progressing     Problem: Pain  Goal: Verbalizes/displays adequate comfort level or baseline comfort level  4/19/2024 1027 by Amita Michelle RN  Outcome: Adequate for Discharge  Flowsheets (Taken 4/19/2024 1027)  Verbalizes/displays adequate comfort level or baseline comfort level:   Encourage patient to monitor pain and request assistance   Administer analgesics based on type and severity of pain and evaluate response   Implement non-pharmacological measures as appropriate and evaluate response   Assess pain using appropriate pain scale  4/19/2024 0139 by Graciela Hudson RN  Outcome: Progressing  Flowsheets (Taken 4/18/2024 1956)  Verbalizes/displays adequate comfort level or baseline comfort level: Encourage patient to monitor pain and request assistance     Problem: 
  Problem: Discharge Planning  Goal: Discharge to home or other facility with appropriate resources  Outcome: Progressing     Problem: Safety - Adult  Goal: Free from fall injury  Outcome: Progressing     Problem: Pain  Goal: Verbalizes/displays adequate comfort level or baseline comfort level  Outcome: Progressing     Problem: ABCDS Injury Assessment  Goal: Absence of physical injury  Outcome: Progressing     Problem: Chronic Conditions and Co-morbidities  Goal: Patient's chronic conditions and co-morbidity symptoms are monitored and maintained or improved  Outcome: Progressing     
  Problem: Discharge Planning  Goal: Discharge to home or other facility with appropriate resources  Outcome: Progressing     Problem: Safety - Adult  Goal: Free from fall injury  Outcome: Progressing     Problem: Pain  Goal: Verbalizes/displays adequate comfort level or baseline comfort level  Outcome: Progressing     Problem: ABCDS Injury Assessment  Goal: Absence of physical injury  Outcome: Progressing     Problem: Chronic Conditions and Co-morbidities  Goal: Patient's chronic conditions and co-morbidity symptoms are monitored and maintained or improved  Outcome: Progressing     
  Problem: Discharge Planning  Goal: Discharge to home or other facility with appropriate resources  Outcome: Progressing  Flowsheets (Taken 4/18/2024 2006)  Discharge to home or other facility with appropriate resources: Identify discharge learning needs (meds, wound care, etc)     Problem: Safety - Adult  Goal: Free from fall injury  Outcome: Progressing     Problem: Pain  Goal: Verbalizes/displays adequate comfort level or baseline comfort level  Outcome: Progressing  Flowsheets (Taken 4/18/2024 1956)  Verbalizes/displays adequate comfort level or baseline comfort level: Encourage patient to monitor pain and request assistance     Problem: ABCDS Injury Assessment  Goal: Absence of physical injury  Outcome: Progressing  Flowsheets (Taken 4/19/2024 0138)  Absence of Physical Injury: Implement safety measures based on patient assessment     Problem: Chronic Conditions and Co-morbidities  Goal: Patient's chronic conditions and co-morbidity symptoms are monitored and maintained or improved  Outcome: Progressing  Flowsheets (Taken 4/18/2024 2006)  Care Plan - Patient's Chronic Conditions and Co-Morbidity Symptoms are Monitored and Maintained or Improved:   Monitor and assess patient's chronic conditions and comorbid symptoms for stability, deterioration, or improvement   Collaborate with multidisciplinary team to address chronic and comorbid conditions and prevent exacerbation or deterioration   Update acute care plan with appropriate goals if chronic or comorbid symptoms are exacerbated and prevent overall improvement and discharge     
  Problem: Safety - Adult  Goal: Free from fall injury  Outcome: Progressing     Problem: Pain  Goal: Verbalizes/displays adequate comfort level or baseline comfort level  Outcome: Progressing  Flowsheets (Taken 4/14/2024 2243)  Verbalizes/displays adequate comfort level or baseline comfort level:   Encourage patient to monitor pain and request assistance   Assess pain using appropriate pain scale     Problem: ABCDS Injury Assessment  Goal: Absence of physical injury  Outcome: Progressing     
contents:    Title   Name    Date    Time    Physician: Jorge Luis Draper MD 04/16/24 10:33 AM     Case Mgmt: Bee Cazares, MSW LSW 4/15/2024 at  9:04am    OT: Joseph Fields, OTR/L #[id number], 4/15/2024, 1453    PT: Dede Soni, PT [id number], 4/15/24, 3539    RN: JOSÉ MIGUEL Dunn, RN 4/15/2024 at 0823    ST: Regla Pace M.A. CCC-SLP #[id number] 4/15/24, 1401     :  Deondre Rice PT, DPT [id number]  4/16/24  7:34 AM

## 2024-04-19 NOTE — DISCHARGE INSTR - DIET

## 2024-05-25 ENCOUNTER — APPOINTMENT (OUTPATIENT)
Dept: CT IMAGING | Age: 74
End: 2024-05-25
Payer: COMMERCIAL

## 2024-05-25 ENCOUNTER — APPOINTMENT (OUTPATIENT)
Dept: GENERAL RADIOLOGY | Age: 74
End: 2024-05-25
Payer: COMMERCIAL

## 2024-05-25 ENCOUNTER — HOSPITAL ENCOUNTER (INPATIENT)
Age: 74
LOS: 5 days | Discharge: NURSING, REHAB OR LONG-TERM CARE | End: 2024-05-31
Attending: EMERGENCY MEDICINE | Admitting: HOSPITALIST
Payer: COMMERCIAL

## 2024-05-25 DIAGNOSIS — I63.9 CEREBROVASCULAR ACCIDENT (CVA), UNSPECIFIED MECHANISM (HCC): ICD-10-CM

## 2024-05-25 DIAGNOSIS — R41.82 ALTERED MENTAL STATUS, UNSPECIFIED ALTERED MENTAL STATUS TYPE: ICD-10-CM

## 2024-05-25 DIAGNOSIS — G93.40 ACUTE ENCEPHALOPATHY: Primary | ICD-10-CM

## 2024-05-25 DIAGNOSIS — I63.9 ACUTE ISCHEMIC STROKE (HCC): ICD-10-CM

## 2024-05-25 DIAGNOSIS — Z86.73 HISTORY OF CVA (CEREBROVASCULAR ACCIDENT): ICD-10-CM

## 2024-05-25 DIAGNOSIS — R79.89 ELEVATED BRAIN NATRIURETIC PEPTIDE (BNP) LEVEL: ICD-10-CM

## 2024-05-25 LAB
ALBUMIN SERPL-MCNC: 3.9 G/DL (ref 3.4–5)
ALBUMIN/GLOB SERPL: 1.4 {RATIO} (ref 1.1–2.2)
ALP SERPL-CCNC: 72 U/L (ref 40–129)
ALT SERPL-CCNC: 12 U/L (ref 10–40)
ANION GAP SERPL CALCULATED.3IONS-SCNC: 13 MMOL/L (ref 3–16)
AST SERPL-CCNC: 19 U/L (ref 15–37)
BASOPHILS # BLD: 0 K/UL (ref 0–0.2)
BASOPHILS NFR BLD: 0.5 %
BILIRUB SERPL-MCNC: 1.1 MG/DL (ref 0–1)
BILIRUB UR QL STRIP.AUTO: NEGATIVE
BUN SERPL-MCNC: 14 MG/DL (ref 7–20)
CALCIUM SERPL-MCNC: 8.8 MG/DL (ref 8.3–10.6)
CHLORIDE SERPL-SCNC: 104 MMOL/L (ref 99–110)
CLARITY UR: CLEAR
CO2 SERPL-SCNC: 27 MMOL/L (ref 21–32)
COLOR UR: YELLOW
CREAT SERPL-MCNC: 0.9 MG/DL (ref 0.8–1.3)
DEPRECATED RDW RBC AUTO: 14.3 % (ref 12.4–15.4)
EOSINOPHIL # BLD: 0.1 K/UL (ref 0–0.6)
EOSINOPHIL NFR BLD: 0.9 %
GFR SERPLBLD CREATININE-BSD FMLA CKD-EPI: 89 ML/MIN/{1.73_M2}
GLUCOSE BLD-MCNC: 74 MG/DL (ref 70–99)
GLUCOSE SERPL-MCNC: 91 MG/DL (ref 70–99)
GLUCOSE UR STRIP.AUTO-MCNC: NEGATIVE MG/DL
HCT VFR BLD AUTO: 34.5 % (ref 40.5–52.5)
HGB BLD-MCNC: 11.5 G/DL (ref 13.5–17.5)
HGB UR QL STRIP.AUTO: NEGATIVE
KETONES UR STRIP.AUTO-MCNC: NEGATIVE MG/DL
LEUKOCYTE ESTERASE UR QL STRIP.AUTO: NEGATIVE
LYMPHOCYTES # BLD: 1.1 K/UL (ref 1–5.1)
LYMPHOCYTES NFR BLD: 16.3 %
MCH RBC QN AUTO: 30.3 PG (ref 26–34)
MCHC RBC AUTO-ENTMCNC: 33.4 G/DL (ref 31–36)
MCV RBC AUTO: 90.7 FL (ref 80–100)
MONOCYTES # BLD: 0.5 K/UL (ref 0–1.3)
MONOCYTES NFR BLD: 7.3 %
NEUTROPHILS # BLD: 5.3 K/UL (ref 1.7–7.7)
NEUTROPHILS NFR BLD: 75 %
NITRITE UR QL STRIP.AUTO: NEGATIVE
NT-PROBNP SERPL-MCNC: 1030 PG/ML (ref 0–449)
PERFORMED ON: NORMAL
PH UR STRIP.AUTO: 6.5 [PH] (ref 5–8)
PLATELET # BLD AUTO: 132 K/UL (ref 135–450)
PMV BLD AUTO: 8.9 FL (ref 5–10.5)
POTASSIUM SERPL-SCNC: 3.9 MMOL/L (ref 3.5–5.1)
PROT SERPL-MCNC: 6.6 G/DL (ref 6.4–8.2)
PROT UR STRIP.AUTO-MCNC: NEGATIVE MG/DL
RBC # BLD AUTO: 3.8 M/UL (ref 4.2–5.9)
SODIUM SERPL-SCNC: 144 MMOL/L (ref 136–145)
SP GR UR STRIP.AUTO: 1.01 (ref 1–1.03)
TROPONIN, HIGH SENSITIVITY: 19 NG/L (ref 0–22)
UA COMPLETE W REFLEX CULTURE PNL UR: NORMAL
UA DIPSTICK W REFLEX MICRO PNL UR: NORMAL
URN SPEC COLLECT METH UR: NORMAL
UROBILINOGEN UR STRIP-ACNC: 0.2 E.U./DL
WBC # BLD AUTO: 7 K/UL (ref 4–11)

## 2024-05-25 PROCEDURE — 93005 ELECTROCARDIOGRAM TRACING: CPT | Performed by: PHYSICIAN ASSISTANT

## 2024-05-25 PROCEDURE — 84484 ASSAY OF TROPONIN QUANT: CPT

## 2024-05-25 PROCEDURE — 83880 ASSAY OF NATRIURETIC PEPTIDE: CPT

## 2024-05-25 PROCEDURE — 2580000003 HC RX 258: Performed by: PHYSICIAN ASSISTANT

## 2024-05-25 PROCEDURE — 70450 CT HEAD/BRAIN W/O DYE: CPT

## 2024-05-25 PROCEDURE — 85025 COMPLETE CBC W/AUTO DIFF WBC: CPT

## 2024-05-25 PROCEDURE — 80053 COMPREHEN METABOLIC PANEL: CPT

## 2024-05-25 PROCEDURE — 81003 URINALYSIS AUTO W/O SCOPE: CPT

## 2024-05-25 PROCEDURE — 71045 X-RAY EXAM CHEST 1 VIEW: CPT

## 2024-05-25 PROCEDURE — 99285 EMERGENCY DEPT VISIT HI MDM: CPT

## 2024-05-25 RX ORDER — 0.9 % SODIUM CHLORIDE 0.9 %
1000 INTRAVENOUS SOLUTION INTRAVENOUS ONCE
Status: COMPLETED | OUTPATIENT
Start: 2024-05-25 | End: 2024-05-25

## 2024-05-25 RX ADMIN — SODIUM CHLORIDE 1000 ML: 9 INJECTION, SOLUTION INTRAVENOUS at 21:18

## 2024-05-26 ENCOUNTER — APPOINTMENT (OUTPATIENT)
Dept: CT IMAGING | Age: 74
End: 2024-05-26
Payer: COMMERCIAL

## 2024-05-26 LAB
AMMONIA PLAS-SCNC: 36 UMOL/L (ref 16–60)
ANION GAP SERPL CALCULATED.3IONS-SCNC: 10 MMOL/L (ref 3–16)
BUN SERPL-MCNC: 13 MG/DL (ref 7–20)
CALCIUM SERPL-MCNC: 9 MG/DL (ref 8.3–10.6)
CHLORIDE SERPL-SCNC: 107 MMOL/L (ref 99–110)
CO2 SERPL-SCNC: 30 MMOL/L (ref 21–32)
CREAT SERPL-MCNC: 0.9 MG/DL (ref 0.8–1.3)
DEPRECATED RDW RBC AUTO: 14.3 % (ref 12.4–15.4)
EKG ATRIAL RATE: 78 BPM
EKG DIAGNOSIS: NORMAL
EKG P AXIS: 83 DEGREES
EKG P-R INTERVAL: 168 MS
EKG Q-T INTERVAL: 402 MS
EKG QRS DURATION: 78 MS
EKG QTC CALCULATION (BAZETT): 458 MS
EKG R AXIS: 56 DEGREES
EKG T AXIS: 79 DEGREES
EKG VENTRICULAR RATE: 78 BPM
GFR SERPLBLD CREATININE-BSD FMLA CKD-EPI: 89 ML/MIN/{1.73_M2}
GLUCOSE BLD-MCNC: 107 MG/DL (ref 70–99)
GLUCOSE BLD-MCNC: 129 MG/DL (ref 70–99)
GLUCOSE BLD-MCNC: 136 MG/DL (ref 70–99)
GLUCOSE BLD-MCNC: 83 MG/DL (ref 70–99)
GLUCOSE BLD-MCNC: 85 MG/DL (ref 70–99)
GLUCOSE SERPL-MCNC: 89 MG/DL (ref 70–99)
HCT VFR BLD AUTO: 34.2 % (ref 40.5–52.5)
HGB BLD-MCNC: 11.6 G/DL (ref 13.5–17.5)
MAGNESIUM SERPL-MCNC: 1.7 MG/DL (ref 1.8–2.4)
MCH RBC QN AUTO: 30.7 PG (ref 26–34)
MCHC RBC AUTO-ENTMCNC: 34 G/DL (ref 31–36)
MCV RBC AUTO: 90.3 FL (ref 80–100)
PERFORMED ON: ABNORMAL
PERFORMED ON: NORMAL
PERFORMED ON: NORMAL
PLATELET # BLD AUTO: 132 K/UL (ref 135–450)
PMV BLD AUTO: 9.1 FL (ref 5–10.5)
POTASSIUM SERPL-SCNC: 3.4 MMOL/L (ref 3.5–5.1)
RBC # BLD AUTO: 3.79 M/UL (ref 4.2–5.9)
SODIUM SERPL-SCNC: 147 MMOL/L (ref 136–145)
WBC # BLD AUTO: 6.3 K/UL (ref 4–11)

## 2024-05-26 PROCEDURE — 36415 COLL VENOUS BLD VENIPUNCTURE: CPT

## 2024-05-26 PROCEDURE — 83735 ASSAY OF MAGNESIUM: CPT

## 2024-05-26 PROCEDURE — 80048 BASIC METABOLIC PNL TOTAL CA: CPT

## 2024-05-26 PROCEDURE — 85027 COMPLETE CBC AUTOMATED: CPT

## 2024-05-26 PROCEDURE — 93010 ELECTROCARDIOGRAM REPORT: CPT | Performed by: INTERNAL MEDICINE

## 2024-05-26 PROCEDURE — 6370000000 HC RX 637 (ALT 250 FOR IP): Performed by: NURSE PRACTITIONER

## 2024-05-26 PROCEDURE — 6370000000 HC RX 637 (ALT 250 FOR IP): Performed by: INTERNAL MEDICINE

## 2024-05-26 PROCEDURE — 2580000003 HC RX 258: Performed by: NURSE PRACTITIONER

## 2024-05-26 PROCEDURE — 6360000004 HC RX CONTRAST MEDICATION: Performed by: INTERNAL MEDICINE

## 2024-05-26 PROCEDURE — 6360000002 HC RX W HCPCS: Performed by: EMERGENCY MEDICINE

## 2024-05-26 PROCEDURE — 1200000000 HC SEMI PRIVATE

## 2024-05-26 PROCEDURE — 6360000002 HC RX W HCPCS: Performed by: INTERNAL MEDICINE

## 2024-05-26 PROCEDURE — 82140 ASSAY OF AMMONIA: CPT

## 2024-05-26 PROCEDURE — 2060000000 HC ICU INTERMEDIATE R&B

## 2024-05-26 RX ORDER — SODIUM CHLORIDE 0.9 % (FLUSH) 0.9 %
5-40 SYRINGE (ML) INJECTION EVERY 12 HOURS SCHEDULED
Status: DISCONTINUED | OUTPATIENT
Start: 2024-05-26 | End: 2024-05-31 | Stop reason: HOSPADM

## 2024-05-26 RX ORDER — SODIUM CHLORIDE 0.9 % (FLUSH) 0.9 %
5-40 SYRINGE (ML) INJECTION PRN
Status: DISCONTINUED | OUTPATIENT
Start: 2024-05-26 | End: 2024-05-31 | Stop reason: HOSPADM

## 2024-05-26 RX ORDER — FUROSEMIDE 20 MG/1
20 TABLET ORAL DAILY
Status: DISCONTINUED | OUTPATIENT
Start: 2024-05-26 | End: 2024-05-27

## 2024-05-26 RX ORDER — MAGNESIUM SULFATE IN WATER 40 MG/ML
2000 INJECTION, SOLUTION INTRAVENOUS ONCE
Status: DISCONTINUED | OUTPATIENT
Start: 2024-05-26 | End: 2024-05-26 | Stop reason: SDUPTHER

## 2024-05-26 RX ORDER — POTASSIUM CHLORIDE 20 MEQ/1
40 TABLET, EXTENDED RELEASE ORAL PRN
Status: DISCONTINUED | OUTPATIENT
Start: 2024-05-26 | End: 2024-05-31 | Stop reason: HOSPADM

## 2024-05-26 RX ORDER — MV-MN/IRON/FA/K1/RESV/LUT/HERB 18 MG-240
1 TABLET ORAL DAILY
Status: DISCONTINUED | OUTPATIENT
Start: 2024-05-26 | End: 2024-05-26 | Stop reason: RX

## 2024-05-26 RX ORDER — METOPROLOL SUCCINATE 50 MG/1
50 TABLET, EXTENDED RELEASE ORAL DAILY
Status: DISCONTINUED | OUTPATIENT
Start: 2024-05-26 | End: 2024-05-31 | Stop reason: HOSPADM

## 2024-05-26 RX ORDER — ONDANSETRON 2 MG/ML
4 INJECTION INTRAMUSCULAR; INTRAVENOUS EVERY 6 HOURS PRN
Status: DISCONTINUED | OUTPATIENT
Start: 2024-05-26 | End: 2024-05-31 | Stop reason: HOSPADM

## 2024-05-26 RX ORDER — FUROSEMIDE 10 MG/ML
20 INJECTION INTRAMUSCULAR; INTRAVENOUS DAILY
Status: DISCONTINUED | OUTPATIENT
Start: 2024-05-26 | End: 2024-05-26

## 2024-05-26 RX ORDER — ASPIRIN 81 MG/1
81 TABLET, CHEWABLE ORAL DAILY
Status: DISCONTINUED | OUTPATIENT
Start: 2024-05-26 | End: 2024-05-31 | Stop reason: HOSPADM

## 2024-05-26 RX ORDER — ATORVASTATIN CALCIUM 80 MG/1
80 TABLET, FILM COATED ORAL NIGHTLY
Status: DISCONTINUED | OUTPATIENT
Start: 2024-05-26 | End: 2024-05-31 | Stop reason: HOSPADM

## 2024-05-26 RX ORDER — MAGNESIUM SULFATE IN WATER 40 MG/ML
2000 INJECTION, SOLUTION INTRAVENOUS ONCE
Status: COMPLETED | OUTPATIENT
Start: 2024-05-26 | End: 2024-05-26

## 2024-05-26 RX ORDER — POTASSIUM CHLORIDE 7.45 MG/ML
10 INJECTION INTRAVENOUS PRN
Status: DISCONTINUED | OUTPATIENT
Start: 2024-05-26 | End: 2024-05-31 | Stop reason: HOSPADM

## 2024-05-26 RX ORDER — EPLERENONE 25 MG/1
12.5 TABLET, FILM COATED ORAL DAILY
Status: DISCONTINUED | OUTPATIENT
Start: 2024-05-26 | End: 2024-05-31 | Stop reason: HOSPADM

## 2024-05-26 RX ORDER — HYDRALAZINE HYDROCHLORIDE 10 MG/1
10 TABLET, FILM COATED ORAL ONCE
Status: COMPLETED | OUTPATIENT
Start: 2024-05-26 | End: 2024-05-27

## 2024-05-26 RX ORDER — PANTOPRAZOLE SODIUM 40 MG/1
40 TABLET, DELAYED RELEASE ORAL
Status: DISCONTINUED | OUTPATIENT
Start: 2024-05-26 | End: 2024-05-31 | Stop reason: HOSPADM

## 2024-05-26 RX ORDER — ACETAMINOPHEN 500 MG
500 TABLET ORAL EVERY 6 HOURS PRN
Status: DISCONTINUED | OUTPATIENT
Start: 2024-05-26 | End: 2024-05-31 | Stop reason: HOSPADM

## 2024-05-26 RX ORDER — HYDRALAZINE HYDROCHLORIDE 20 MG/ML
10 INJECTION INTRAMUSCULAR; INTRAVENOUS EVERY 6 HOURS PRN
Status: DISCONTINUED | OUTPATIENT
Start: 2024-05-26 | End: 2024-05-26

## 2024-05-26 RX ORDER — SODIUM CHLORIDE 9 MG/ML
INJECTION, SOLUTION INTRAVENOUS PRN
Status: DISCONTINUED | OUTPATIENT
Start: 2024-05-26 | End: 2024-05-31 | Stop reason: HOSPADM

## 2024-05-26 RX ORDER — ONDANSETRON 4 MG/1
4 TABLET, ORALLY DISINTEGRATING ORAL EVERY 8 HOURS PRN
Status: DISCONTINUED | OUTPATIENT
Start: 2024-05-26 | End: 2024-05-31 | Stop reason: HOSPADM

## 2024-05-26 RX ORDER — FUROSEMIDE 10 MG/ML
20 INJECTION INTRAMUSCULAR; INTRAVENOUS ONCE
Status: COMPLETED | OUTPATIENT
Start: 2024-05-26 | End: 2024-05-26

## 2024-05-26 RX ORDER — POLYETHYLENE GLYCOL 3350 17 G/17G
17 POWDER, FOR SOLUTION ORAL DAILY PRN
Status: DISCONTINUED | OUTPATIENT
Start: 2024-05-26 | End: 2024-05-31 | Stop reason: HOSPADM

## 2024-05-26 RX ORDER — MAGNESIUM SULFATE IN WATER 40 MG/ML
2000 INJECTION, SOLUTION INTRAVENOUS PRN
Status: DISCONTINUED | OUTPATIENT
Start: 2024-05-26 | End: 2024-05-31 | Stop reason: HOSPADM

## 2024-05-26 RX ADMIN — EPLERENONE 12.5 MG: 25 TABLET, FILM COATED ORAL at 09:47

## 2024-05-26 RX ADMIN — METOPROLOL SUCCINATE 50 MG: 50 TABLET, EXTENDED RELEASE ORAL at 09:43

## 2024-05-26 RX ADMIN — FUROSEMIDE 20 MG: 10 INJECTION, SOLUTION INTRAMUSCULAR; INTRAVENOUS at 03:10

## 2024-05-26 RX ADMIN — SODIUM CHLORIDE, PRESERVATIVE FREE 10 ML: 5 INJECTION INTRAVENOUS at 03:10

## 2024-05-26 RX ADMIN — PANTOPRAZOLE SODIUM 40 MG: 40 TABLET, DELAYED RELEASE ORAL at 06:36

## 2024-05-26 RX ADMIN — POTASSIUM CHLORIDE 40 MEQ: 1500 TABLET, EXTENDED RELEASE ORAL at 17:55

## 2024-05-26 RX ADMIN — FUROSEMIDE 20 MG: 20 TABLET ORAL at 09:43

## 2024-05-26 RX ADMIN — ATORVASTATIN CALCIUM 80 MG: 80 TABLET, FILM COATED ORAL at 20:21

## 2024-05-26 RX ADMIN — ASPIRIN 81 MG 81 MG: 81 TABLET ORAL at 09:43

## 2024-05-26 RX ADMIN — MAGNESIUM SULFATE HEPTAHYDRATE 2000 MG: 40 INJECTION, SOLUTION INTRAVENOUS at 09:52

## 2024-05-26 RX ADMIN — SODIUM CHLORIDE, PRESERVATIVE FREE 10 ML: 5 INJECTION INTRAVENOUS at 09:43

## 2024-05-26 NOTE — PROGRESS NOTES
Patient seen and examined by one of my partners earlier today.    I agree with their assessment and plan.   Patient also seen and examined by me today.  Chart reviewed.    Necessary orders placed.  Will continue to follow while in the hospital.        Aleshia Mckenna MD

## 2024-05-26 NOTE — PROGRESS NOTES
CT called- pt's peripheral IV blew while getting contrast. Order unable to be changed to CT WO contrast. DIT called for line placement as this test requires specific access and pt is a very hard stick.

## 2024-05-26 NOTE — PROGRESS NOTES
Magnesium given this AM per orders. Duplicate order placed by MD. Samson per Dr Mckenna to d/c order.

## 2024-05-26 NOTE — ED NOTES
ED TO INPATIENT SBAR HANDOFF    Patient Name: Dawood Camacho   :  1950  73 y.o.   MRN:  [id number]  Preferred Name  dawood  ED Room #:  ED-0005/05  Family/Caregiver Present yes   Restraints no   Sitter no   Sepsis Risk Score Sepsis Risk Score: 0.84    Situation  Code Status: Prior No additional code details.    Allergies: Pork-derived products  Weight: No data found.  Arrived from: home  Chief Complaint:   Chief Complaint   Patient presents with    Altered Mental Status     Pt brought in my  from home. Wife called 911 due to  being outside for 3 hours doing chores. Per wife pt hs hx of stroke and was acting \"off\", states he was acting like this previously when he had a stroke. Last known well unknown.     Hospital Problem/Diagnosis:  Principal Problem:    Acute encephalopathy  Resolved Problems:    * No resolved hospital problems. *    Imaging:   XR CHEST PORTABLE   Final Result   1. Indistinct perihilar vasculature. Differential includes interstitial edema   versus atypical infection.   2. Stable mild enlargement of the cardiac silhouette.         CT Head W/O Contrast   Final Result   Chronic findings in the brain without acute CT abnormality identified.           Abnormal labs:   Abnormal Labs Reviewed   CBC WITH AUTO DIFFERENTIAL - Abnormal; Notable for the following components:       Result Value    RBC 3.80 (*)     Hemoglobin 11.5 (*)     Hematocrit 34.5 (*)     Platelets 132 (*)     All other components within normal limits   COMPREHENSIVE METABOLIC PANEL W/ REFLEX TO MG FOR LOW K - Abnormal; Notable for the following components:    Total Bilirubin 1.1 (*)     All other components within normal limits   BRAIN NATRIURETIC PEPTIDE - Abnormal; Notable for the following components:    Pro-BNP 1,030 (*)     All other components within normal limits     Critical values: no     Abnormal Assessment Findings: see results    Background  History:   Past Medical History:   Diagnosis Date

## 2024-05-26 NOTE — ED PROVIDER NOTES
furosemide (LASIX) injection 20 mg (has no administration in time range)   sodium chloride 0.9 % bolus 1,000 mL (1,000 mLs IntraVENous New Bag 5/25/24 5753)             Is this patient to be included in the SEP-1 Core Measure due to severe sepsis or septic shock?   No   Exclusion criteria - the patient is NOT to be included for SEP-1 Core Measure due to:  Infection is not suspected    CONSULTS: (Who and What was discussed)  None  Discussion with Other Profesionals : None    Social Determinants : None    Records Reviewed : None    CC/HPI Summary, DDx, ED Course, and Reassessment: Briefly is a 73-year-old male who presents to the emergency department due to family concern for altered mental status.  Patient's wife called 911 due to patient not appearing well.  States that he has been outside for the last 3 hours doing chores was concerned for him as he has had a stroke in the past with similar presentation.    Patient does have a history of dementia does have a history of CVA with some left-sided residual weakness.  Exam today he otherwise appears well in no acute distress.  He follows commands appropriately.  He has full range of motion of all extremities.  There is no focal neurological deficits.  There is some slight baseline weakness of the left upper and lower extremity but no other abnormalities.  Lungs were clear to station heart has regular rate and rhythm.  He is afebrile.    Patient's son did show up shortly after my initial evaluation of the patient and states that the patient actually appears well and more energetic than he does normally at baseline.    Due to the family's concern for this altered mental status and patient's most recent CVA this past April workup was pursued.    CBC with hemoglobin 11.5 consistent with his history.  CMP did not show any acute abnormalities.  Urinalysis did not show acute infection.  BMP did show an elevated level at 1030.  Troponin was normal at 19.  Chest x-ray did show  interstitial edema.  EKG read as normal sinus rhythm.    CT scan of the head without contrast did not show any acute intracranial abnormalities.  Patient's NIH was 0.    Patient was given a liter of fluids.  He has remained stable here in the emergency department.  Given his history of CVA in the past with similar presentation and concern for altered mental status patient will be admitted to the hospital for further evaluation.    [employer] sent to hospitalist for admission.    I am the Primary Clinician of Record.    FINAL IMPRESSION      1. Acute encephalopathy    2. Altered mental status, unspecified altered mental status type    3. History of CVA (cerebrovascular accident)    4. Elevated brain natriuretic peptide (BNP) level          DISPOSITION/PLAN     DISPOSITION Decision To Admit 05/26/2024 12:24:34 AM      PATIENT REFERRED TO:  No follow-up provider specified.    DISCHARGE MEDICATIONS:  New Prescriptions    No medications on file       DISCONTINUED MEDICATIONS:  Discontinued Medications    No medications on file              (Please note that portions of this note were completed with a voice recognition program.  Efforts were made to edit the dictations but occasionally words are mis-transcribed.)    GRETCHEN Cardensa (electronically signed)           Navneet Jimenez PA  05/26/24 0024

## 2024-05-26 NOTE — H&P
V2.0  History and Physical      Name:  Maximo Camacho /Age/Sex: 1950  (73 y.o. male)   MRN & CSN:  [id number] & [id number] Encounter Date/Time: 2024 12:58 AM EDT   Location:  ED- PCP: System, Referring Not In (Inactive)       Hospital Day: 2    Assessment and Plan:   Maximo Camacho is a 73 y.o. male with a pmh of TBI, recent CVA in 2024, hypertension, dementia who presents for evaluation of altered mental status.  However his son arrived and stated that the patient was at his baseline mental status.  Hospital Problems             Last Modified POA    * (Principal) Acute encephalopathy 2024 Yes       Plan:  # Concerns for altered mental status.   # Family reports  hx of dementia but states this is undiagnosed.  Had an acute stroke on 2024.  CT head with chronic findings.  Chest x-ray showed interstitial edema.  Urinalysis negative.  Normal troponin.  Pending ammonia.  CTA from 2024 reviewed.  -Last known well unknown.  Initial NIH stroke scale of 0.  -Admit to [facility] telemetry.  -Per the patient's son at bedside, the patient is at his baseline cognition.  He reports that the patient acts that way when he overexerts himself or he is in pain due to restless legs.  Will do neuro checks every 4 hours and consider further imaging and neuro consultation if with worsening neurofunction.  -Home aspirin and statin resumed.  -Check echocardiogram    # Elevated BNP at 1030.  # Bilateral ankle edema.  # Interstitial edema per chest x-ray.  -EKG without acute findings.  -Troponin not detected.  -Continue with Lasix started in the ED.  -Daily weight, intake and output monitoring.  -Check echocardiogram.    # Hypertension.  Keep goal SBP less than 160.  Resume home antihypertensives.  Hydralazine as needed.    # History of CVA.        Disposition:   Current Living situation: Lives with wife at home  Expected Disposition: Home  Estimated D/C: 1 to 2 days    Diet Adult diet   DVT Prophylaxis [x]  signed by JÚNIOR Velez - CNP on 5/26/2024 at 12:58 AM

## 2024-05-26 NOTE — PROGRESS NOTES
Completed admission except for med rec. Patient unaware of his meds, too early to call wife. Oriented to room, bed, and call light. Passed scheduled med. Urinal at bedside. Denies needs, call light in reach, bed alarm engaged.

## 2024-05-26 NOTE — ED PROVIDER NOTES
In addition to the advanced practice provider, I personally saw Maximo Camacho and performed a substantive portion of the visit including all aspects of the medical decision making. I made/approved the management plan and take responsibility for the patient management    Briefly, this is a 73 y.o. male here for altered mental status.  Patient with history of dementia, unable to contribute much meaningfully to history at this time and the majority of history is obtained from patient's son who is at bedside.  Son states the patient was out working in the yard for numerous hours today, he seemed \"off\" to family and thus EMS was called.  On EMS arrival patient continued to clean the house and sweep the floors and would not stop.  At time my evaluation, son reports the patient seems to be acting back to his normal self.  There is no observation of fevers, vomiting or other signs of illness.  On record review, patient was admitted to the hospital on 4/9/2024 for similar symptoms and was noted to have acute stroke on MRI.  Son also reports the patient has had new swelling in his feet and lower legs.    On exam, patient afebrile and nontoxic. No overt painful or respiratory distress. Heart RRR. Lungs CTAB. Abdomen soft, nondistended, no distress elicited with deep palpation all quadrants.  Patient is drowsy, opens eyes to voice and light touch, mouths responses to some questions although speech is largely unintelligible. PERRL, gaze conjugate, tracks examiner across midline.  Symmetric handgrip and plantarflexion.  Withdraws all extremities to pressure.    EKG  EKG was reviewed by emergency department physician in the absence of a cardiologist    Narrow complex sinus rhythm, rate 78, normal axis, normal CA and QRS intervals, normal Qtc, no specific ST elevations or depressions, normal t-wave morphology, impression NSR with LVH, no STEMI, no significant change from comparison 4/9/2024      Screenings  NIH Stroke

## 2024-05-27 ENCOUNTER — APPOINTMENT (OUTPATIENT)
Dept: CT IMAGING | Age: 74
End: 2024-05-27
Payer: COMMERCIAL

## 2024-05-27 LAB
25(OH)D3 SERPL-MCNC: 33.4 NG/ML
ALBUMIN SERPL-MCNC: 3.8 G/DL (ref 3.4–5)
ALBUMIN/GLOB SERPL: 1.4 {RATIO} (ref 1.1–2.2)
ALP SERPL-CCNC: 72 U/L (ref 40–129)
ALT SERPL-CCNC: 10 U/L (ref 10–40)
ANION GAP SERPL CALCULATED.3IONS-SCNC: 10 MMOL/L (ref 3–16)
AST SERPL-CCNC: 13 U/L (ref 15–37)
BASOPHILS # BLD: 0 K/UL (ref 0–0.2)
BASOPHILS NFR BLD: 0.6 %
BILIRUB SERPL-MCNC: 0.9 MG/DL (ref 0–1)
BUN SERPL-MCNC: 13 MG/DL (ref 7–20)
CALCIUM SERPL-MCNC: 8.9 MG/DL (ref 8.3–10.6)
CHLORIDE SERPL-SCNC: 104 MMOL/L (ref 99–110)
CO2 SERPL-SCNC: 31 MMOL/L (ref 21–32)
CREAT SERPL-MCNC: 1 MG/DL (ref 0.8–1.3)
DEPRECATED RDW RBC AUTO: 14 % (ref 12.4–15.4)
EOSINOPHIL # BLD: 0.1 K/UL (ref 0–0.6)
EOSINOPHIL NFR BLD: 2.4 %
FERRITIN SERPL IA-MCNC: 109.7 NG/ML (ref 30–400)
FOLATE SERPL-MCNC: 10.3 NG/ML (ref 4.78–24.2)
GFR SERPLBLD CREATININE-BSD FMLA CKD-EPI: 79 ML/MIN/{1.73_M2}
GLUCOSE SERPL-MCNC: 95 MG/DL (ref 70–99)
HCT VFR BLD AUTO: 37 % (ref 40.5–52.5)
HGB BLD-MCNC: 12.4 G/DL (ref 13.5–17.5)
IRON SATN MFR SERPL: 21 % (ref 20–50)
IRON SERPL-MCNC: 45 UG/DL (ref 59–158)
LYMPHOCYTES # BLD: 1.2 K/UL (ref 1–5.1)
LYMPHOCYTES NFR BLD: 21.2 %
MAGNESIUM SERPL-MCNC: 2 MG/DL (ref 1.8–2.4)
MCH RBC QN AUTO: 30.2 PG (ref 26–34)
MCHC RBC AUTO-ENTMCNC: 33.6 G/DL (ref 31–36)
MCV RBC AUTO: 90 FL (ref 80–100)
MONOCYTES # BLD: 0.6 K/UL (ref 0–1.3)
MONOCYTES NFR BLD: 11.1 %
NEUTROPHILS # BLD: 3.7 K/UL (ref 1.7–7.7)
NEUTROPHILS NFR BLD: 64.7 %
NT-PROBNP SERPL-MCNC: 1771 PG/ML (ref 0–449)
PHOSPHATE SERPL-MCNC: 2.6 MG/DL (ref 2.5–4.9)
PLATELET # BLD AUTO: 138 K/UL (ref 135–450)
PMV BLD AUTO: 9.4 FL (ref 5–10.5)
POTASSIUM SERPL-SCNC: 4 MMOL/L (ref 3.5–5.1)
PROCALCITONIN SERPL IA-MCNC: 0.05 NG/ML (ref 0–0.15)
PROT SERPL-MCNC: 6.5 G/DL (ref 6.4–8.2)
RBC # BLD AUTO: 4.11 M/UL (ref 4.2–5.9)
SODIUM SERPL-SCNC: 145 MMOL/L (ref 136–145)
TIBC SERPL-MCNC: 211 UG/DL (ref 260–445)
TSH SERPL DL<=0.005 MIU/L-ACNC: 2.1 UIU/ML (ref 0.27–4.2)
VIT B12 SERPL-MCNC: 342 PG/ML (ref 211–911)
WBC # BLD AUTO: 5.7 K/UL (ref 4–11)

## 2024-05-27 PROCEDURE — 83540 ASSAY OF IRON: CPT

## 2024-05-27 PROCEDURE — 92610 EVALUATE SWALLOWING FUNCTION: CPT

## 2024-05-27 PROCEDURE — 80053 COMPREHEN METABOLIC PANEL: CPT

## 2024-05-27 PROCEDURE — 6370000000 HC RX 637 (ALT 250 FOR IP): Performed by: NURSE PRACTITIONER

## 2024-05-27 PROCEDURE — 6360000004 HC RX CONTRAST MEDICATION: Performed by: INTERNAL MEDICINE

## 2024-05-27 PROCEDURE — 97165 OT EVAL LOW COMPLEX 30 MIN: CPT

## 2024-05-27 PROCEDURE — 82746 ASSAY OF FOLIC ACID SERUM: CPT

## 2024-05-27 PROCEDURE — 84443 ASSAY THYROID STIM HORMONE: CPT

## 2024-05-27 PROCEDURE — 84145 PROCALCITONIN (PCT): CPT

## 2024-05-27 PROCEDURE — 85025 COMPLETE CBC W/AUTO DIFF WBC: CPT

## 2024-05-27 PROCEDURE — 84100 ASSAY OF PHOSPHORUS: CPT

## 2024-05-27 PROCEDURE — 83880 ASSAY OF NATRIURETIC PEPTIDE: CPT

## 2024-05-27 PROCEDURE — 1200000000 HC SEMI PRIVATE

## 2024-05-27 PROCEDURE — 83550 IRON BINDING TEST: CPT

## 2024-05-27 PROCEDURE — 6370000000 HC RX 637 (ALT 250 FOR IP): Performed by: INTERNAL MEDICINE

## 2024-05-27 PROCEDURE — 82607 VITAMIN B-12: CPT

## 2024-05-27 PROCEDURE — 82306 VITAMIN D 25 HYDROXY: CPT

## 2024-05-27 PROCEDURE — 82728 ASSAY OF FERRITIN: CPT

## 2024-05-27 PROCEDURE — 2060000000 HC ICU INTERMEDIATE R&B

## 2024-05-27 PROCEDURE — 92526 ORAL FUNCTION THERAPY: CPT

## 2024-05-27 PROCEDURE — 83735 ASSAY OF MAGNESIUM: CPT

## 2024-05-27 PROCEDURE — 97116 GAIT TRAINING THERAPY: CPT

## 2024-05-27 PROCEDURE — 6360000002 HC RX W HCPCS: Performed by: INTERNAL MEDICINE

## 2024-05-27 PROCEDURE — 36415 COLL VENOUS BLD VENIPUNCTURE: CPT

## 2024-05-27 PROCEDURE — 2580000003 HC RX 258: Performed by: NURSE PRACTITIONER

## 2024-05-27 PROCEDURE — 97161 PT EVAL LOW COMPLEX 20 MIN: CPT

## 2024-05-27 PROCEDURE — 97535 SELF CARE MNGMENT TRAINING: CPT

## 2024-05-27 PROCEDURE — 71260 CT THORAX DX C+: CPT

## 2024-05-27 RX ORDER — FUROSEMIDE 10 MG/ML
20 INJECTION INTRAMUSCULAR; INTRAVENOUS 2 TIMES DAILY
Status: DISCONTINUED | OUTPATIENT
Start: 2024-05-27 | End: 2024-05-28

## 2024-05-27 RX ORDER — HYDRALAZINE HYDROCHLORIDE 25 MG/1
25 TABLET, FILM COATED ORAL EVERY 8 HOURS SCHEDULED
Status: DISCONTINUED | OUTPATIENT
Start: 2024-05-27 | End: 2024-05-31 | Stop reason: HOSPADM

## 2024-05-27 RX ORDER — HYDRALAZINE HYDROCHLORIDE 25 MG/1
25 TABLET, FILM COATED ORAL EVERY 8 HOURS SCHEDULED
Status: DISCONTINUED | OUTPATIENT
Start: 2024-05-27 | End: 2024-05-27

## 2024-05-27 RX ADMIN — ATORVASTATIN CALCIUM 80 MG: 80 TABLET, FILM COATED ORAL at 19:50

## 2024-05-27 RX ADMIN — FUROSEMIDE 20 MG: 20 TABLET ORAL at 09:14

## 2024-05-27 RX ADMIN — SODIUM CHLORIDE, PRESERVATIVE FREE 10 ML: 5 INJECTION INTRAVENOUS at 19:51

## 2024-05-27 RX ADMIN — HYDRALAZINE HYDROCHLORIDE 10 MG: 10 TABLET, FILM COATED ORAL at 03:50

## 2024-05-27 RX ADMIN — FUROSEMIDE 20 MG: 10 INJECTION, SOLUTION INTRAMUSCULAR; INTRAVENOUS at 19:51

## 2024-05-27 RX ADMIN — ASPIRIN 81 MG 81 MG: 81 TABLET ORAL at 09:14

## 2024-05-27 RX ADMIN — IOPAMIDOL 75 ML: 755 INJECTION, SOLUTION INTRAVENOUS at 13:40

## 2024-05-27 RX ADMIN — EPLERENONE 12.5 MG: 25 TABLET, FILM COATED ORAL at 09:14

## 2024-05-27 RX ADMIN — PANTOPRAZOLE SODIUM 40 MG: 40 TABLET, DELAYED RELEASE ORAL at 06:46

## 2024-05-27 RX ADMIN — METOPROLOL SUCCINATE 50 MG: 50 TABLET, EXTENDED RELEASE ORAL at 09:16

## 2024-05-27 RX ADMIN — SODIUM CHLORIDE, PRESERVATIVE FREE 10 ML: 5 INJECTION INTRAVENOUS at 09:15

## 2024-05-27 RX ADMIN — HYDRALAZINE HYDROCHLORIDE 25 MG: 25 TABLET ORAL at 19:50

## 2024-05-27 NOTE — PLAN OF CARE
Problem: Discharge Planning  Goal: Discharge to home or other facility with appropriate resources  Outcome: Progressing     Problem: Safety - Adult  Goal: Free from fall injury  Outcome: Progressing  Note: Fall precautions in place, bed alarm on, nonskid foot wear applied, bed in lowest position, and call light within reach. Will continue to monitor.      Problem: Neurosensory - Adult  Goal: Achieves stable or improved neurological status  Outcome: Progressing  Flowsheets (Taken 5/27/2024 0155)  Achieves stable or improved neurological status:   Assess for and report changes in neurological status   Maintain blood pressure and fluid volume within ordered parameters to optimize cerebral perfusion and minimize risk of hemorrhage  Note: MRI brain pending.     Problem: Cardiovascular - Adult  Goal: Maintains optimal cardiac output and hemodynamic stability  Outcome: Progressing  Flowsheets (Taken 5/27/2024 0155)  Maintains optimal cardiac output and hemodynamic stability:   Monitor blood pressure and heart rate   Assess for signs of decreased cardiac output  Note: CT chest and echo pending.       Problem: Musculoskeletal - Adult  Goal: Return mobility to safest level of function  Outcome: Progressing  Flowsheets (Taken 5/27/2024 0156)  Return Mobility to Safest Level of Function: Obtain physical therapy/occupational therapy consults as needed

## 2024-05-27 NOTE — PROGRESS NOTES
Facility/Department: [address]  Speech Language Pathology  DYSPHAGIA BEDSIDE SWALLOW EVALUATION     Patient: Maximo Camacho   : 1950   MRN: [id number]      Evaluation Date: 2024   Admitting Diagnosis: History of CVA (cerebrovascular accident) [Z86.73]  Elevated brain natriuretic peptide (BNP) level [R79.89]  Acute encephalopathy [G93.40]  Altered mental status, unspecified altered mental status type [R41.82]  Cerebrovascular accident (CVA), unspecified mechanism (HCC) [I63.9]  Pain: Denies                                                       H&P: Maximo Camacho is a 73 y.o. male who presents to the emergency room due to family concern that the patient was acting abnormal.  Patient family is not at bedside currently on initial evaluation.  Wife called wanted to the  because hide for the past 3 hours doing chores and noted that he was acting off.  Patient does have a history of dementia diabetes.  Patient does have difficulty with hearing.  Here in the emergency room he states that he has some swelling of his feet but does not have any other acute complaints.       Imaging:  Chest X-ray:   IMPRESSION:  1. Indistinct perihilar vasculature. Differential includes interstitial edema  versus atypical infection.  2. Stable mild enlargement of the cardiac silhouette.       Head CT:   IMPRESSION:  Chronic findings in the brain without acute CT abnormality identified.      History/Prior Level of Function:   Living Status: lives with their family  Prior Dysphagia History: Per chart review most recent clinical swallow evaluation was completed at this facility on 24 (see report). A Dysphagia III Soft and Bite-Sized with thin liquids was recommended at that time. Currently the Pt demonstrates poor auditory processing and poor ability to consistently answer basic questions or follow commands. MRI is pending. Speech Language Evaluation to be completed as indicated.  Reason for referral: SLP

## 2024-05-27 NOTE — PROGRESS NOTES
V2.0    [facility] Progress Note      Name:  Maximo Camacho /Age/Sex: 1950  (73 y.o. male)   MRN & CSN:  [id number] & [id number] Encounter Date/Time: 2024 6:22 PM EDT   Location:  [facility]3361/3361-01 PCP: System, Referring Not In (Inactive)     Attending:Aleshia Mckenna MD       Hospital Day: 3    Assessment and Recommendations   Maximo Camacho is a 73 y.o. male with pmh of TBI, recent CVA in 2024, HTN, dementia brought in for evaluation of altered.  The patient's wife called 911 due to altered mental status, patient was not appearing well and concern for a stroke.  Prior to symptom onset patient had been in his usual state of health, working outside for ~ 3 hours the day prior.  Shortly after presentation to the ED, the Son came in and mentioned that patient was at his baseline but he was not aware of his symptoms prior to presentation. He requested a complete workup before discharging patient home due to his concern for recurrent stroke and progressive aortic root aneurysm.      Plan:     Altered mental status: r/o CVA/TIA   Family reports cognitive impairment without a formal diagnosis of dementia   Patient recently had a stroke on 2024.    Last known well unknown. NIH stroke scale of 0 on admission.  CT head with chronic findings.    CTA from 2024 reviewed.  Chest x-ray showed interstitial edema.    Urinalysis negative.    Normal troponin.   Ammonia, B12, folate wnl.  MRI brain and TSH pending       Aortic root aneurysm:   Aortic root severely dilated, 5.1 cm on echo 3/2024  CT chest 2024 stable aortic root dilatation, 5.2 cm.  Outpatient follow-up with cardiologist.    Acute on chronic systolic CHF:   TTE 3/8/2024: LVEF 30 to 35%.  Moderate AR, Aortic root severely dilated, 5.1 cm, Ascending aorta dilated at 4.1 cm   Elevated BNP at 1030.  Bilateral ankle edema with Interstitial edema noted on chest x-ray.  EKG without acute findings.  Troponin not detected.  Continue CHF protocol with IV  abnormality identified.     XR CHEST PORTABLE    Result Date: 5/25/2024  EXAMINATION: ONE XRAY VIEW OF THE CHEST 5/25/2024 9:16 pm COMPARISON: April 9, 2024 HISTORY: ORDERING SYSTEM PROVIDED HISTORY: hypoxemia on arrival TECHNOLOGIST PROVIDED HISTORY: Reason for exam:->hypoxemia on arrival FINDINGS: Stable mild enlargement of the cardiac silhouette.  Perihilar vasculature is indistinct.  No focal consolidation, pleural effusion, or pneumothorax. Thoracic aorta is tortuous.     1. Indistinct perihilar vasculature. Differential includes interstitial edema versus atypical infection. 2. Stable mild enlargement of the cardiac silhouette.       CBC:   Recent Labs     05/25/24 2109 05/26/24  0552 05/27/24  0802   WBC 7.0 6.3 5.7   HGB 11.5* 11.6* 12.4*   * 132* 138     BMP:    Recent Labs     05/25/24 2109 05/26/24  0552 05/27/24  0802    147* 145   K 3.9 3.4* 4.0    107 104   CO2 27 30 31   BUN 14 13 13   CREATININE 0.9 0.9 1.0   GLUCOSE 91 89 95     Hepatic:   Recent Labs     05/25/24 2109 05/27/24  0802   AST 19 13*   ALT 12 10   BILITOT 1.1* 0.9   ALKPHOS 72 72     Lipids:   Lab Results   Component Value Date/Time    CHOL 191 04/10/2024 05:29 AM    HDL 66 04/10/2024 05:29 AM    TRIG 74 04/10/2024 05:29 AM     Hemoglobin A1C:   Lab Results   Component Value Date/Time    LABA1C 5.5 04/10/2024 05:29 AM     TSH: No results found for: \"TSH\"  Troponin: No results found for: \"TROPONINT\"  Lactic Acid: No results for input(s): \"LACTA\" in the last 72 hours.  BNP:   Recent Labs     05/25/24 2109 05/27/24  0802   PROBNP 1,030* 1,771*     UA:  Lab Results   Component Value Date/Time    NITRU Negative 05/25/2024 10:25 PM    COLORU Yellow 05/25/2024 10:25 PM    PHUR 6.5 05/25/2024 10:25 PM    PHUR 6.5 04/09/2024 04:30 AM    CLARITYU Clear 05/25/2024 10:25 PM    LEUKOCYTESUR Negative 05/25/2024 10:25 PM    UROBILINOGEN 0.2 05/25/2024 10:25 PM    BILIRUBINUR Negative 05/25/2024 10:25 PM    BLOODU Negative

## 2024-05-27 NOTE — PROGRESS NOTES
[facility] - Inpatient Rehabilitation Department   Phone: [phone]    Occupational Therapy    [x] Initial Evaluation            [] Daily Treatment Note         [] Discharge Summary      Patient: Maximo Camacho   : 1950   MRN: [id number]   Date of Service:  2024    Admitting Diagnosis:  Acute encephalopathy  Current Admission Summary: Maximo Camacho is a 73 y.o. male who presents to the emergency room due to family concern that the patient was acting abnormal.  Patient family is not at bedside currently on initial evaluation.  Wife called wanted to the  because hide for the past 3 hours doing chores and noted that he was acting off.  Patient does have a history of dementia diabetes.  Patient does have difficulty with hearing.  Here in the emergency room he states that he has some swelling of his feet but does not have any other acute complaints.      CT negative MRI pending    Past Medical History:  has a past medical history of Allergic rhinitis, Cerebral artery occlusion with cerebral infarction (HCC), Dementia (HCC), Hyperlipidemia, Hypertension, Injury brain, traumatic (HCC), and Polyp, stomach.  Past Surgical History:  has a past surgical history that includes Colonoscopy; Upper gastrointestinal endoscopy (08/15/2016); and Upper gastrointestinal endoscopy (N/A, 2021).    Discharge Recommendations: Maximo Camacho scored a 18/24 on the AM-PAC ADL Inpatient form. Current research shows that an AM-PAC score of 18 or greater is typically associated with a discharge to the patient's home setting. Based on the patient's AM-PAC score, and their current ADL deficits, it is recommended that the patient have 2-3 sessions per week of Occupational Therapy at d/c to increase the patient's independence.  At this time, this patient demonstrates the endurance and safety to discharge home with HOME HEALTH CARE: LEVEL 1 STANDARD    - Initial home health evaluation to occur within 24-48

## 2024-05-27 NOTE — PROGRESS NOTES
[facility] - Inpatient Rehabilitation Department   Phone: [phone]    Physical Therapy    [x] Initial Evaluation            [] Daily Treatment Note         [] Discharge Summary      Patient: Maximo Camacho   : 1950   MRN: [id number]   Date of Service:  2024  Admitting Diagnosis: Acute encephalopathy  Current Admission Summary: Maximo Camacho is a 73 y.o. male who presents to the emergency room due to family concern that the patient was acting abnormal.  Patient family is not at bedside currently on initial evaluation.  Wife called wanted to the  because hide for the past 3 hours doing chores and noted that he was acting off.  Patient does have a history of dementia diabetes.  Patient does have difficulty with hearing.  Here in the emergency room he states that he has some swelling of his feet but does not have any other acute complaints.   Past Medical History:  has a past medical history of Allergic rhinitis, Cerebral artery occlusion with cerebral infarction (HCC), Dementia (HCC), Hyperlipidemia, Hypertension, Injury brain, traumatic (HCC), and Polyp, stomach.  Past Surgical History:  has a past surgical history that includes Colonoscopy; Upper gastrointestinal endoscopy (08/15/2016); and Upper gastrointestinal endoscopy (N/A, 2021).  Discharge Recommendations: Maximo Camacho scored a 18/24 on the AM-PAC short mobility form. Current research shows that an AM-PAC score of 18 or greater is typically associated with a discharge to the patient's home setting. Based on the patient's AM-PAC score and their current functional mobility deficits, it is recommended that the patient have 2-3 sessions per week of Physical Therapy at d/c to increase the patient's independence.  At this time, this patient demonstrates the endurance and safety to discharge home with (home services) and a follow up treatment frequency of 2-3x/wk.  Please see assessment section for further patient  notified    Plan  Frequency: 3-5 x/per week  Current Treatment Recommendations: strengthening, ROM, balance training, functional mobility training, transfer training, gait training, stair training, endurance training, neuromuscular re-education, patient/caregiver education, home exercise program, and safety education    Goals  Patient Goals: Did not state   Short Term Goals:  Time Frame: Prior to D/C  Patient will complete bed mobility at modified independent   Patient will complete transfers at supervision   Patient will ambulate 50 ft with use of no device at supervision    Above goals reviewed on 5/27/2024.  All goals are ongoing at this time unless indicated above.      Therapy Session Time      Individual Group Co-treatment   Time In      0913   Time Out      0941   Minutes      28     Timed Code Treatment Minutes:   13  Total Treatment Minutes:  28       Electronically Signed By: Pietro Degroot, PT  Pietro Degroot, PT, DPT, [id number]

## 2024-05-28 ENCOUNTER — APPOINTMENT (OUTPATIENT)
Dept: MRI IMAGING | Age: 74
End: 2024-05-28
Payer: COMMERCIAL

## 2024-05-28 PROBLEM — E78.5 HYPERLIPIDEMIA: Status: ACTIVE | Noted: 2024-05-28

## 2024-05-28 PROBLEM — I50.20 HFREF (HEART FAILURE WITH REDUCED EJECTION FRACTION) (HCC): Status: ACTIVE | Noted: 2024-05-28

## 2024-05-28 LAB
ALBUMIN SERPL-MCNC: 3.7 G/DL (ref 3.4–5)
ALBUMIN/GLOB SERPL: 1.3 {RATIO} (ref 1.1–2.2)
ALP SERPL-CCNC: 70 U/L (ref 40–129)
ALT SERPL-CCNC: 10 U/L (ref 10–40)
ANION GAP SERPL CALCULATED.3IONS-SCNC: 11 MMOL/L (ref 3–16)
AST SERPL-CCNC: 13 U/L (ref 15–37)
BASOPHILS # BLD: 0 K/UL (ref 0–0.2)
BASOPHILS NFR BLD: 0.4 %
BILIRUB SERPL-MCNC: 0.7 MG/DL (ref 0–1)
BUN SERPL-MCNC: 15 MG/DL (ref 7–20)
CALCIUM SERPL-MCNC: 8.9 MG/DL (ref 8.3–10.6)
CHLORIDE SERPL-SCNC: 101 MMOL/L (ref 99–110)
CO2 SERPL-SCNC: 30 MMOL/L (ref 21–32)
CREAT SERPL-MCNC: 1 MG/DL (ref 0.8–1.3)
DEPRECATED RDW RBC AUTO: 14.3 % (ref 12.4–15.4)
EOSINOPHIL # BLD: 0.2 K/UL (ref 0–0.6)
EOSINOPHIL NFR BLD: 2.7 %
GFR SERPLBLD CREATININE-BSD FMLA CKD-EPI: 79 ML/MIN/{1.73_M2}
GLUCOSE SERPL-MCNC: 92 MG/DL (ref 70–99)
HCT VFR BLD AUTO: 37.1 % (ref 40.5–52.5)
HGB BLD-MCNC: 12.7 G/DL (ref 13.5–17.5)
LYMPHOCYTES # BLD: 1.6 K/UL (ref 1–5.1)
LYMPHOCYTES NFR BLD: 28 %
MAGNESIUM SERPL-MCNC: 2 MG/DL (ref 1.8–2.4)
MCH RBC QN AUTO: 31.2 PG (ref 26–34)
MCHC RBC AUTO-ENTMCNC: 34.3 G/DL (ref 31–36)
MCV RBC AUTO: 90.7 FL (ref 80–100)
MONOCYTES # BLD: 0.7 K/UL (ref 0–1.3)
MONOCYTES NFR BLD: 12.1 %
NEUTROPHILS # BLD: 3.2 K/UL (ref 1.7–7.7)
NEUTROPHILS NFR BLD: 56.8 %
PHOSPHATE SERPL-MCNC: 3.5 MG/DL (ref 2.5–4.9)
PLATELET # BLD AUTO: 144 K/UL (ref 135–450)
PMV BLD AUTO: 8.9 FL (ref 5–10.5)
POTASSIUM SERPL-SCNC: 4 MMOL/L (ref 3.5–5.1)
PROT SERPL-MCNC: 6.5 G/DL (ref 6.4–8.2)
RBC # BLD AUTO: 4.09 M/UL (ref 4.2–5.9)
SODIUM SERPL-SCNC: 142 MMOL/L (ref 136–145)
WBC # BLD AUTO: 5.7 K/UL (ref 4–11)

## 2024-05-28 PROCEDURE — 2060000000 HC ICU INTERMEDIATE R&B

## 2024-05-28 PROCEDURE — 6370000000 HC RX 637 (ALT 250 FOR IP): Performed by: INTERNAL MEDICINE

## 2024-05-28 PROCEDURE — 80053 COMPREHEN METABOLIC PANEL: CPT

## 2024-05-28 PROCEDURE — 1200000000 HC SEMI PRIVATE

## 2024-05-28 PROCEDURE — 70551 MRI BRAIN STEM W/O DYE: CPT

## 2024-05-28 PROCEDURE — 6370000000 HC RX 637 (ALT 250 FOR IP): Performed by: STUDENT IN AN ORGANIZED HEALTH CARE EDUCATION/TRAINING PROGRAM

## 2024-05-28 PROCEDURE — 83735 ASSAY OF MAGNESIUM: CPT

## 2024-05-28 PROCEDURE — 6370000000 HC RX 637 (ALT 250 FOR IP): Performed by: NURSE PRACTITIONER

## 2024-05-28 PROCEDURE — 97535 SELF CARE MNGMENT TRAINING: CPT

## 2024-05-28 PROCEDURE — 2580000003 HC RX 258: Performed by: NURSE PRACTITIONER

## 2024-05-28 PROCEDURE — 36415 COLL VENOUS BLD VENIPUNCTURE: CPT

## 2024-05-28 PROCEDURE — 92526 ORAL FUNCTION THERAPY: CPT

## 2024-05-28 PROCEDURE — 84100 ASSAY OF PHOSPHORUS: CPT

## 2024-05-28 PROCEDURE — 85025 COMPLETE CBC W/AUTO DIFF WBC: CPT

## 2024-05-28 RX ORDER — LORAZEPAM 0.5 MG/1
0.5 TABLET ORAL EVERY 30 MIN PRN
Status: DISCONTINUED | OUTPATIENT
Start: 2024-05-28 | End: 2024-05-31 | Stop reason: HOSPADM

## 2024-05-28 RX ORDER — FUROSEMIDE 20 MG/1
20 TABLET ORAL DAILY
Status: DISCONTINUED | OUTPATIENT
Start: 2024-05-28 | End: 2024-05-31 | Stop reason: HOSPADM

## 2024-05-28 RX ADMIN — SACUBITRIL AND VALSARTAN 1 TABLET: 24; 26 TABLET, FILM COATED ORAL at 10:28

## 2024-05-28 RX ADMIN — METOPROLOL SUCCINATE 50 MG: 50 TABLET, EXTENDED RELEASE ORAL at 10:28

## 2024-05-28 RX ADMIN — ATORVASTATIN CALCIUM 80 MG: 80 TABLET, FILM COATED ORAL at 20:25

## 2024-05-28 RX ADMIN — FUROSEMIDE 20 MG: 20 TABLET ORAL at 10:28

## 2024-05-28 RX ADMIN — HYDRALAZINE HYDROCHLORIDE 25 MG: 25 TABLET ORAL at 20:25

## 2024-05-28 RX ADMIN — SODIUM CHLORIDE, PRESERVATIVE FREE 10 ML: 5 INJECTION INTRAVENOUS at 20:25

## 2024-05-28 RX ADMIN — LORAZEPAM 0.5 MG: 0.5 TABLET ORAL at 14:32

## 2024-05-28 RX ADMIN — HYDRALAZINE HYDROCHLORIDE 25 MG: 25 TABLET ORAL at 15:45

## 2024-05-28 RX ADMIN — SACUBITRIL AND VALSARTAN 1 TABLET: 24; 26 TABLET, FILM COATED ORAL at 20:25

## 2024-05-28 RX ADMIN — EPLERENONE 12.5 MG: 25 TABLET, FILM COATED ORAL at 10:28

## 2024-05-28 RX ADMIN — PANTOPRAZOLE SODIUM 40 MG: 40 TABLET, DELAYED RELEASE ORAL at 05:10

## 2024-05-28 RX ADMIN — HYDRALAZINE HYDROCHLORIDE 25 MG: 25 TABLET ORAL at 05:10

## 2024-05-28 RX ADMIN — ASPIRIN 81 MG 81 MG: 81 TABLET ORAL at 10:28

## 2024-05-28 RX ADMIN — SODIUM CHLORIDE, PRESERVATIVE FREE 10 ML: 5 INJECTION INTRAVENOUS at 10:29

## 2024-05-28 ASSESSMENT — PAIN SCALES - GENERAL: PAINLEVEL_OUTOF10: 0

## 2024-05-28 NOTE — PROGRESS NOTES
Hospitalist Progress Note    Name:  Maximo Camacho    /Age/Sex: 1950  (73 y.o. male)  MRN & CSN:  [id number] & [id number]    PCP: System, Referring Not In (Inactive)    Date of Admission: 2024    Patient Status:  Inpatient     Chief Complaint:   Chief Complaint   Patient presents with    Altered Mental Status     Pt brought in my  from home. Wife called 911 due to  being outside for 3 hours doing chores. Per wife pt hs hx of stroke and was acting \"off\", states he was acting like this previously when he had a stroke. Last known well unknown.       Hospital Course:   Patient mated for altered mental status.  Concern for stroke.  CT head nonacute.    Subjective:  Today is:  Hospital Day: 4.  Patient seen and examined in [id number]-3361/3361-01.     Sitting up in chair.  Doing okay.  Is hard of hearing, but denies pain.      Medications:  Reviewed    Infusion Medications    sodium chloride       Scheduled Medications    sacubitril-valsartan  1 tablet Oral BID    furosemide  20 mg Oral Daily    hydrALAZINE  25 mg Oral 3 times per day    aspirin  81 mg Oral Daily    atorvastatin  80 mg Oral Nightly    eplerenone  12.5 mg Oral Daily    metoprolol succinate  50 mg Oral Daily    pantoprazole  40 mg Oral QAM AC    sodium chloride flush  5-40 mL IntraVENous 2 times per day     PRN Meds: LORazepam, acetaminophen, sodium chloride flush, sodium chloride, ondansetron **OR** ondansetron, polyethylene glycol, perflutren lipid microspheres, potassium chloride **OR** potassium alternative oral replacement **OR** potassium chloride, magnesium sulfate    No intake or output data in the 24 hours ending 24 1335    Physical Exam Performed:    /74   Pulse 69   Temp 97.5 °F (36.4 °C) (Oral)   Resp 18   Ht 1.727 m (5' 8\")   Wt 60.1 kg (132 lb 9.6 oz)   SpO2 97%   BMI 20.16 kg/m²     General appearance: No apparent distress, appears stated age and cooperative.   HEENT: Pupils equal, round, and

## 2024-05-28 NOTE — PROGRESS NOTES
Facility/Department: [address]  Speech Language Pathology   Dysphagia Treatment Note    Patient: Maximo Camacho   : 1950   MRN: [id number]      Evaluation Date: 2024      Admitting Dx: History of CVA (cerebrovascular accident) [Z86.73]  Elevated brain natriuretic peptide (BNP) level [R79.89]  Acute encephalopathy [G93.40]  Altered mental status, unspecified altered mental status type [R41.82]  Cerebrovascular accident (CVA), unspecified mechanism (HCC) [I63.9]  Treatment Diagnosis: Oropharyngeal Dysphagia   Pain: Did not state                                              Diet and Treatment Recommendations 2024:  Diet Solids Recommendation:  Dysphagia III Soft and bite sized  Liquid Consistency Recommendation:  Thin liquids  Recommended form of Meds: Meds whole with water or Meds in puree           Compensatory strategies:   Upright as possible with all PO intake , Assist Feed , Small bites/sips , Swallow 2 times per bite , Eat/feed slowly, Remain upright 30-45 min     Assessment of Texture Tolerance:  Diet level prior to treatment: Dysphagia III Soft and bite sized , Thin liquids   Tolerance of Current Diet Level:Per chart, no noted difficulty with current diet level  RN reported pt appears to be tolerating current diet level     Impressions: Pt was positioned Upright in bed , awake and alert with his family present. Currently on room air. Trials of thin liquids and regular solids  were provided to assess swallow function. The pt was able to feed himself with set up assistance from the SLP. The pt presented with prolonged and disorganized mastication with reduced A-P propulsion, requiring further mastication following liquid washes to clear. The pt presented with delayed initiation of the swallow with thin liquids but had no overt signs/symptoms of penetration/aspiration. The pt required repeated verbal cues, visual models, and visual cues to open his mouth to assess his oral clearance.

## 2024-05-28 NOTE — PROGRESS NOTES
[facility] - Inpatient Rehabilitation Department   Phone: [phone]    Occupational Therapy    [] Initial Evaluation            [x] Daily Treatment Note         [] Discharge Summary      Patient: Maximo Camacho   : 1950   MRN: [id number]   Date of Service:  2024    Admitting Diagnosis:  Acute encephalopathy  Current Admission Summary: Maximo Camacho is a 73 y.o. male who presents to the emergency room due to family concern that the patient was acting abnormal.  Patient family is not at bedside currently on initial evaluation.  Wife called wanted to the  because hide for the past 3 hours doing chores and noted that he was acting off.  Patient does have a history of dementia diabetes.  Patient does have difficulty with hearing.  Here in the emergency room he states that he has some swelling of his feet but does not have any other acute complaints.      CT negative MRI pending    Past Medical History:  has a past medical history of Allergic rhinitis, Cerebral artery occlusion with cerebral infarction (HCC), Dementia (HCC), Hyperlipidemia, Hypertension, Injury brain, traumatic (HCC), and Polyp, stomach.  Past Surgical History:  has a past surgical history that includes Colonoscopy; Upper gastrointestinal endoscopy (08/15/2016); and Upper gastrointestinal endoscopy (N/A, 2021).    Discharge Recommendations: Maximo Camacho scored a 19/24 on the AM-PAC ADL Inpatient form. Current research shows that an AM-PAC score of 18 or greater is typically associated with a discharge to the patient's home setting. Based on the patient's AM-PAC score, and their current ADL deficits, it is recommended that the patient have 2-3 sessions per week of Occupational Therapy at d/c to increase the patient's independence.  At this time, this patient demonstrates the endurance and safety to discharge home with HOME HEALTH CARE: LEVEL 1 STANDARD    - Initial home health evaluation to occur within 24-48

## 2024-05-28 NOTE — CARE COORDINATION
Case Management Assessment  Initial Evaluation    Date/Time of Evaluation: 5/28/2024 3:20 PM  Assessment Completed by: BURTON Sykes, HERMELINDAW    If patient is discharged prior to next notation, then this note serves as note for discharge by case management.    Patient Name: Maximo Camacho                   YOB: 1950  Diagnosis: History of CVA (cerebrovascular accident) [Z86.73]  Elevated brain natriuretic peptide (BNP) level [R79.89]  Acute encephalopathy [G93.40]  Altered mental status, unspecified altered mental status type [R41.82]  Cerebrovascular accident (CVA), unspecified mechanism (HCC) [I63.9]                   Date / Time: 5/25/2024  8:31 PM    Patient Admission Status: Inpatient   Readmission Risk (Low < 19, Mod (19-27), High > 27): Readmission Risk Score: 17.9    Current PCP: System, Referring Not In (Inactive)  PCP verified by CM? Yes    Chart Reviewed: Yes      History Provided by: Spouse  Patient Orientation: Person, Place    Patient Cognition: Alert    Hospitalization in the last 30 days (Readmission):  No    If yes, Readmission Assessment in CM Navigator will be completed.    Advance Directives:      Code Status: Full Code   Patient's Primary Decision Maker is: Legal Next of Kin      Discharge Planning:    Patient lives with: Spouse/Significant Other Type of Home: House (1 level - no steps)  Primary Care Giver: Self  Patient Support Systems include: Spouse/Significant Other, Family Members   Current Financial resources: Medicaid  Current community resources: None  Current services prior to admission: Durable Medical Equipment, Home Care (Active w/[facility])            Current DME: Other (Comment) [facility]            Type of Home Care services:  OT, PT, Nursing Services, Aide Services    ADLS  Prior functional level: Independent in ADLs/IADLs  Current functional level: Mobility (Needs HHC)    PT AM-PAC: 18 /24  OT AM-PAC: 19 /24    Family can provide assistance at DC:

## 2024-05-28 NOTE — PLAN OF CARE
Problem: Discharge Planning  Goal: Discharge to home or other facility with appropriate resources  Outcome: Progressing  Flowsheets (Taken 5/28/2024 7010)  Discharge to home or other facility with appropriate resources:   Identify barriers to discharge with patient and caregiver   Arrange for needed discharge resources and transportation as appropriate   Refer to discharge planning if patient needs post-hospital services based on physician order or complex needs related to functional status, cognitive ability or social support system     Problem: Safety - Adult  Goal: Free from fall injury  Outcome: Progressing   Plan to dc home with [facility]. Bed alarm engaged. Needs anticipated.

## 2024-05-28 NOTE — DISCHARGE INSTR - COC
Continuity of Care Form    Patient Name: Maximo Camacho   :  1950  MRN:  [id number]    Admit date:  2024  Discharge date:  2024    Code Status Order: Full Code   Advance Directives:     Admitting Physician:  Jae Caal DO  PCP: System, Referring Not In (Inactive)    Discharging Nurse: CHELY Nuñez  Discharging Hospital Unit/Room#: 3TN-3361/3361-01  Discharging Unit Phone Number: [phone]    Emergency Contact:   Extended Emergency Contact Information  Primary Emergency Contact: Melissa Camacho  Address: [address] [place] [address]  Home Phone: [phone]  Relation: Spouse  Secondary Emergency Contact: Eagle Mckeon  Home Phone: [phone]  Relation: Child    Past Surgical History:  Past Surgical History:   Procedure Laterality Date    COLONOSCOPY      UPPER GASTROINTESTINAL ENDOSCOPY  08/15/2016    UPPER GASTROINTESTINAL ENDOSCOPY N/A 2021    EGD BIOPSY performed by Keanu Santana MD at [facility] ENDOSCOPY       Immunization History:   Immunization History   Administered Date(s) Administered    COVID-19, MODERNA Bivalent, (age 12y+), IM, 50 mcg/0.5 mL 2022    Influenza Virus Vaccine 10/23/2014    Pneumococcal, PPSV23, PNEUMOVAX 23, (age 2y+), SC/IM, 0.5mL 10/23/2014       Active Problems:  Patient Active Problem List   Diagnosis Code    Remote history of stroke Z86.73    TBI (traumatic brain injury) ([facility]) S06.9XAA    Partial deafness H91.90    Cerebral infarction ([facility]) I63.9    Dilated aortic root ([facility]) I77.810    UGIB (upper gastrointestinal bleed) K92.2    Anemia associated with acute blood loss D62    Allergic rhinitis J30.9    Chronic abdominal pain R10.9, G89.29    Encephalopathy, metabolic G93.41    Dementia due to Alzheimer's disease ([facility]) G30.9, F02.80    HTN (hypertension), benign I10    Acute encephalopathy G93.40    Arterial ischemic stroke, ICA, right, acute ([facility]) I63.231    Remote from health care Z75.3    Acute  Equipment (for information only, NOT a DME order):  walker  Other Treatments:     Patient's personal belongings (please select all that are sent with patient):  Hearing Aides left, pajamas top and pants, undershirt    RN SIGNATURE:  Electronically signed by Shannon Nuñez, RN on 5/30/24 at 11:13 AM EDT    CASE MANAGEMENT/SOCIAL WORK SECTION    Inpatient Status Date: 05/25/24    Readmission Risk Assessment Score:  Readmission Risk              Risk of Unplanned Readmission:  16           Discharging to Facility/ Agency   Name:  [address]  [address]  Report: [phone]  Fax: [phone]      / signature: Electronically signed by BURTON Sykes, HERMELINDAW on 5/31/24 at 1:54 PM EDT    PHYSICIAN SECTION    Prognosis: Good    Condition at Discharge: Stable    Rehab Potential (if transferring to Rehab): Good    Recommended Labs or Other Treatments After Discharge: n/a    Physician Certification: I certify the above information and transfer of Maximo Camacho  is necessary for the continuing treatment of the diagnosis listed and that he requires Skilled Nursing Facility for less than 30 days.     Update Admission H&P: No change in H&P    PHYSICIAN SIGNATURE:  Electronically signed by Nito Rae DO on 5/28/24 at 10:09 AM EDT

## 2024-05-28 NOTE — PLAN OF CARE
Problem: Discharge Planning  Goal: Discharge to home or other facility with appropriate resources  Outcome: Progressing  Flowsheets (Taken 5/27/2024 0831 by Chon Freedman, MC)  Discharge to home or other facility with appropriate resources:   Identify barriers to discharge with patient and caregiver   Arrange for needed discharge resources and transportation as appropriate   Refer to discharge planning if patient needs post-hospital services based on physician order or complex needs related to functional status, cognitive ability or social support system     Problem: Safety - Adult  Goal: Free from fall injury  Outcome: Progressing

## 2024-05-29 ENCOUNTER — APPOINTMENT (OUTPATIENT)
Age: 74
End: 2024-05-29
Attending: STUDENT IN AN ORGANIZED HEALTH CARE EDUCATION/TRAINING PROGRAM
Payer: COMMERCIAL

## 2024-05-29 PROBLEM — E13.8 DM (DIABETES MELLITUS), SECONDARY, WITH COMPLICATIONS (HCC): Status: ACTIVE | Noted: 2024-05-29

## 2024-05-29 LAB
ALBUMIN SERPL-MCNC: 3.9 G/DL (ref 3.4–5)
ANION GAP SERPL CALCULATED.3IONS-SCNC: 14 MMOL/L (ref 3–16)
BUN SERPL-MCNC: 16 MG/DL (ref 7–20)
CALCIUM SERPL-MCNC: 9.2 MG/DL (ref 8.3–10.6)
CHLORIDE SERPL-SCNC: 97 MMOL/L (ref 99–110)
CO2 SERPL-SCNC: 29 MMOL/L (ref 21–32)
CREAT SERPL-MCNC: 1.2 MG/DL (ref 0.8–1.3)
DEPRECATED RDW RBC AUTO: 14.4 % (ref 12.4–15.4)
ECHO AO ASC DIAM: 3.9 CM
ECHO AO ASCENDING AORTA INDEX: 2.29 CM/M2
ECHO AO ROOT DIAM: 4.7 CM
ECHO AO ROOT INDEX: 2.76 CM/M2
ECHO AR MAX VEL PISA: 3.3 M/S
ECHO AV AREA PEAK VELOCITY: 3.5 CM2
ECHO AV AREA VTI: 3.6 CM2
ECHO AV AREA/BSA PEAK VELOCITY: 2.1 CM2/M2
ECHO AV AREA/BSA VTI: 2.1 CM2/M2
ECHO AV MEAN GRADIENT: 2 MMHG
ECHO AV MEAN VELOCITY: 0.7 M/S
ECHO AV PEAK GRADIENT: 5 MMHG
ECHO AV PEAK VELOCITY: 1.1 M/S
ECHO AV REGURGITANT PHT: 345 MS
ECHO AV VELOCITY RATIO: 0.91
ECHO AV VTI: 20.2 CM
ECHO BSA: 1.68 M2
ECHO LA AREA 2C: 12.1 CM2
ECHO LA AREA 4C: 12.6 CM2
ECHO LA DIAMETER INDEX: 2.18 CM/M2
ECHO LA DIAMETER: 3.7 CM
ECHO LA MAJOR AXIS: 4.4 CM
ECHO LA MINOR AXIS: 4.2 CM
ECHO LA TO AORTIC ROOT RATIO: 0.79
ECHO LA VOL BP: 29 ML (ref 18–58)
ECHO LA VOL MOD A2C: 29 ML (ref 18–58)
ECHO LA VOL MOD A4C: 28 ML (ref 18–58)
ECHO LA VOL/BSA BIPLANE: 17 ML/M2 (ref 16–34)
ECHO LA VOLUME INDEX MOD A2C: 17 ML/M2 (ref 16–34)
ECHO LA VOLUME INDEX MOD A4C: 16 ML/M2 (ref 16–34)
ECHO LV E' LATERAL VELOCITY: 6 CM/S
ECHO LV E' SEPTAL VELOCITY: 5 CM/S
ECHO LV EDV A2C: 61 ML
ECHO LV EDV A4C: 46 ML
ECHO LV EDV INDEX A4C: 27 ML/M2
ECHO LV EDV NDEX A2C: 36 ML/M2
ECHO LV EJECTION FRACTION A2C: 52 %
ECHO LV EJECTION FRACTION A4C: 61 %
ECHO LV EJECTION FRACTION BIPLANE: 56 % (ref 55–100)
ECHO LV ESV A2C: 29 ML
ECHO LV ESV A4C: 18 ML
ECHO LV ESV INDEX A2C: 17 ML/M2
ECHO LV ESV INDEX A4C: 11 ML/M2
ECHO LV FRACTIONAL SHORTENING: 32 % (ref 28–44)
ECHO LV INTERNAL DIMENSION DIASTOLE INDEX: 2.59 CM/M2
ECHO LV INTERNAL DIMENSION DIASTOLIC: 4.4 CM (ref 4.2–5.9)
ECHO LV INTERNAL DIMENSION SYSTOLIC INDEX: 1.76 CM/M2
ECHO LV INTERNAL DIMENSION SYSTOLIC: 3 CM
ECHO LV IVSD: 0.7 CM (ref 0.6–1)
ECHO LV MASS 2D: 100.6 G (ref 88–224)
ECHO LV MASS INDEX 2D: 59.2 G/M2 (ref 49–115)
ECHO LV POSTERIOR WALL DIASTOLIC: 0.8 CM (ref 0.6–1)
ECHO LV RELATIVE WALL THICKNESS RATIO: 0.36
ECHO LVOT AREA: 3.8 CM2
ECHO LVOT AV VTI INDEX: 0.95
ECHO LVOT DIAM: 2.2 CM
ECHO LVOT MEAN GRADIENT: 2 MMHG
ECHO LVOT PEAK GRADIENT: 4 MMHG
ECHO LVOT PEAK VELOCITY: 1 M/S
ECHO LVOT STROKE VOLUME INDEX: 42.9 ML/M2
ECHO LVOT SV: 72.9 ML
ECHO LVOT VTI: 19.2 CM
ECHO MV A VELOCITY: 0.87 M/S
ECHO MV E DECELERATION TIME (DT): 206 MS
ECHO MV E VELOCITY: 0.56 M/S
ECHO MV E/A RATIO: 0.64
ECHO MV E/E' LATERAL: 9.33
ECHO MV E/E' RATIO (AVERAGED): 10.27
ECHO MV E/E' SEPTAL: 11.2
ECHO PV MAX VELOCITY: 0.8 M/S
ECHO PV PEAK GRADIENT: 2 MMHG
ECHO RA AREA 4C: 14 CM2
ECHO RA END SYSTOLIC VOLUME APICAL 4 CHAMBER INDEX BSA: 22 ML/M2
ECHO RA VOLUME: 37 ML
ECHO RV BASAL DIMENSION: 3 CM
ECHO RV FREE WALL PEAK S': 11 CM/S
ECHO RV LONGITUDINAL DIMENSION: 6 CM
ECHO RV MID DIMENSION: 2.1 CM
ECHO RV TAPSE: 1.8 CM (ref 1.7–?)
GFR SERPLBLD CREATININE-BSD FMLA CKD-EPI: 63 ML/MIN/{1.73_M2}
GLUCOSE SERPL-MCNC: 114 MG/DL (ref 70–99)
HCT VFR BLD AUTO: 39 % (ref 40.5–52.5)
HGB BLD-MCNC: 13.4 G/DL (ref 13.5–17.5)
MCH RBC QN AUTO: 31.1 PG (ref 26–34)
MCHC RBC AUTO-ENTMCNC: 34.5 G/DL (ref 31–36)
MCV RBC AUTO: 90.3 FL (ref 80–100)
PHOSPHATE SERPL-MCNC: 3.1 MG/DL (ref 2.5–4.9)
PLATELET # BLD AUTO: 150 K/UL (ref 135–450)
PMV BLD AUTO: 9 FL (ref 5–10.5)
POTASSIUM SERPL-SCNC: 3.9 MMOL/L (ref 3.5–5.1)
RBC # BLD AUTO: 4.32 M/UL (ref 4.2–5.9)
SODIUM SERPL-SCNC: 140 MMOL/L (ref 136–145)
WBC # BLD AUTO: 5.5 K/UL (ref 4–11)

## 2024-05-29 PROCEDURE — 2580000003 HC RX 258: Performed by: NURSE PRACTITIONER

## 2024-05-29 PROCEDURE — 97530 THERAPEUTIC ACTIVITIES: CPT

## 2024-05-29 PROCEDURE — APPSS60 APP SPLIT SHARED TIME 46-60 MINUTES

## 2024-05-29 PROCEDURE — 93306 TTE W/DOPPLER COMPLETE: CPT | Performed by: INTERNAL MEDICINE

## 2024-05-29 PROCEDURE — 85027 COMPLETE CBC AUTOMATED: CPT

## 2024-05-29 PROCEDURE — 6370000000 HC RX 637 (ALT 250 FOR IP): Performed by: INTERNAL MEDICINE

## 2024-05-29 PROCEDURE — 6370000000 HC RX 637 (ALT 250 FOR IP): Performed by: NURSE PRACTITIONER

## 2024-05-29 PROCEDURE — 97116 GAIT TRAINING THERAPY: CPT

## 2024-05-29 PROCEDURE — 80069 RENAL FUNCTION PANEL: CPT

## 2024-05-29 PROCEDURE — 1200000000 HC SEMI PRIVATE

## 2024-05-29 PROCEDURE — 97164 PT RE-EVAL EST PLAN CARE: CPT

## 2024-05-29 PROCEDURE — 6370000000 HC RX 637 (ALT 250 FOR IP): Performed by: STUDENT IN AN ORGANIZED HEALTH CARE EDUCATION/TRAINING PROGRAM

## 2024-05-29 PROCEDURE — 93306 TTE W/DOPPLER COMPLETE: CPT

## 2024-05-29 PROCEDURE — 36415 COLL VENOUS BLD VENIPUNCTURE: CPT

## 2024-05-29 PROCEDURE — 97168 OT RE-EVAL EST PLAN CARE: CPT

## 2024-05-29 PROCEDURE — 99223 1ST HOSP IP/OBS HIGH 75: CPT | Performed by: PSYCHIATRY & NEUROLOGY

## 2024-05-29 PROCEDURE — APPNB30 APP NON BILLABLE TIME 0-30 MINS

## 2024-05-29 RX ADMIN — PANTOPRAZOLE SODIUM 40 MG: 40 TABLET, DELAYED RELEASE ORAL at 06:26

## 2024-05-29 RX ADMIN — ATORVASTATIN CALCIUM 80 MG: 80 TABLET, FILM COATED ORAL at 20:28

## 2024-05-29 RX ADMIN — METOPROLOL SUCCINATE 50 MG: 50 TABLET, EXTENDED RELEASE ORAL at 09:56

## 2024-05-29 RX ADMIN — SACUBITRIL AND VALSARTAN 1 TABLET: 24; 26 TABLET, FILM COATED ORAL at 09:56

## 2024-05-29 RX ADMIN — FUROSEMIDE 20 MG: 20 TABLET ORAL at 09:56

## 2024-05-29 RX ADMIN — SODIUM CHLORIDE, PRESERVATIVE FREE 10 ML: 5 INJECTION INTRAVENOUS at 09:56

## 2024-05-29 RX ADMIN — HYDRALAZINE HYDROCHLORIDE 25 MG: 25 TABLET ORAL at 06:26

## 2024-05-29 RX ADMIN — EPLERENONE 12.5 MG: 25 TABLET, FILM COATED ORAL at 09:58

## 2024-05-29 RX ADMIN — SACUBITRIL AND VALSARTAN 1 TABLET: 24; 26 TABLET, FILM COATED ORAL at 20:29

## 2024-05-29 RX ADMIN — HYDRALAZINE HYDROCHLORIDE 25 MG: 25 TABLET ORAL at 20:28

## 2024-05-29 RX ADMIN — SODIUM CHLORIDE, PRESERVATIVE FREE 10 ML: 5 INJECTION INTRAVENOUS at 20:27

## 2024-05-29 RX ADMIN — ASPIRIN 81 MG 81 MG: 81 TABLET ORAL at 09:57

## 2024-05-29 NOTE — CARE COORDINATION
[facility], evaluated and will be following during the admission.  Stated that patient may qualify but has been scoring for HHC with therapy.  ARU will continue to follow.    Electronically signed by BURTON Sykes LSW on 5/29/2024 at 2:45 PM

## 2024-05-29 NOTE — PROGRESS NOTES
Physician Progress Note      PATIENT:               NICOLA NAQVI  CSN #:                  [id number]  :                       1950  ADMIT DATE:       2024 8:31 PM  DISCH DATE:  RESPONDING  PROVIDER #:        Nito Rae DO          QUERY TEXT:    Patient admitted with encephalopathy and new CVA found. Noted ED provider   citing \"new onset CHF and noted documentation of \"acute on chronic systolic   CHF\" per attending note . But note \"HFrEF-not in acute exacerbation\" per   attending note . Pt was treated with IV Lasix.  If possible, please document in progress notes and discharge summary if you   are evaluating and /or treating any of the following:    The medical record reflects the following:  Risk Factors: CVA, CHF, dementia    Clinical Indicators: Per ED provider -\"Given potential new onset CHF,   prior CVA, felt prudent to place patient in observation for further evaluation   and care.  Will trial low-dose Lasix.\"  Per H/P-\"Elevated BNP at 1030.# Bilateral ankle edema.# Interstitial edema per   chest x-ray....-Continue with Lasix started in the ED.\"  Per attending note -\"Acute on chronic systolic CHF\"  Per attending note -\"HFrEF-Not in acute exacerbation\"  Echo 3/28/24- EF 30-35% aortic root severely dilated, +bilateral ankle edema,   interstitial edema noted on CXR, BNP 1030 ->1771  CXR   Indistinct perihilar vasculature. Differential includes interstitial   edema versus atypical infection. 2.Stable mild enlargement of the cardiac   silhouette.  Weight 137 lb->129 lb    Treatment: IV Lasix 20 mg x1 on  and IV lasix 20 mg bid on  then to po   lasix 20 mg daily, labs, I/O, Toprol XL, eplerenone,  Options provided:  -- Acute on chronic systolic CHF, treated and resolved  -- Chronic systolic CHF, acute ruled out  -- Other - I will add my own diagnosis  -- Disagree - Not applicable / Not valid  -- Disagree - Clinically unable to determine / Unknown  -- Refer to

## 2024-05-29 NOTE — PROGRESS NOTES
[facility] - Inpatient Rehabilitation Department   Phone: [phone]    Physical Therapy    [x] Re-Evaluation            [] Daily Treatment Note         [] Discharge Summary      Patient: Maximo Camacho   : 1950   MRN: [id number]   Date of Service:  2024  Admitting Diagnosis: Acute encephalopathy  Current Admission Summary: Maximo Camacho is a 73 y.o. male who presents to the emergency room due to family concern that the patient was acting abnormal.  Patient family is not at bedside currently on initial evaluation.  Wife called wanted to the  because hide for the past 3 hours doing chores and noted that he was acting off.  Patient does have a history of dementia diabetes.  Patient does have difficulty with hearing.  Here in the emergency room he states that he has some swelling of his feet but does not have any other acute complaints.    *Decline in balance and increased assistance required for ambulation 24.   Past Medical History:  has a past medical history of Allergic rhinitis, Cerebral artery occlusion with cerebral infarction (HCC), Dementia (HCC), Hyperlipidemia, Hypertension, Injury brain, traumatic (HCC), and Polyp, stomach.  Past Surgical History:  has a past surgical history that includes Colonoscopy; Upper gastrointestinal endoscopy (08/15/2016); and Upper gastrointestinal endoscopy (N/A, 2021).    Discharge Recommendations: Maximo Camacho scored a 14/24 on the AM-PAC short mobility form. Current research shows that an AM-PAC score of 17 or less is typically not associated with a discharge to the patient's home setting. Based on the patient's AM-PAC score and their current functional mobility deficits, it is recommended that the patient have 3-5 sessions per week of Physical Therapy at d/c to increase the patient's independence.  Please see assessment section for further patient specific details.    If patient discharges prior to next session this note will  time and max encouragement    Education  Barriers To Learning: cognition  Patient Education: patient educated on goals, PT role and benefits, plan of care, general safety, functional mobility training, proper use of assistive device/equipment, transfer training, discharge recommendations  Learning Assessment:  patient verbalizes understanding, would benefit from continued reinforcement, patient will require reinforcement due to cognitive deficits    Assessment  Activity Tolerance: Fair, limited by cognition making it difficult to teach use of RW for energy conservation and improved stability with ambulation  Impairments Requiring Therapeutic Intervention: decreased functional mobility, decreased ADL status, decreased ROM, decreased strength, decreased safety awareness, decreased cognition, decreased endurance, decreased balance  Prognosis: good and fair  Clinical Assessment: Pt presents with AMS, hyperglycemic emergency, hypertensive emergency, and sudden Left sided weakness due to R pontine infarct. He presents below his baseline function with the impairments above. He was noted to have increased LOB and required increased assistance for ambulation by RN, thus re-evaluation was necessary. Upon re-evaluation, pt requires Suma x2 for ambulation without AD, Suma progressing to CGA for transfers, and SBA for bed mobility. Due to these limitations and the above impairments, pt would benefit from continued therapy services to promote safe return to PLOF. His discharge recommendation has changed to SNF as pt may not be a good candidate for ARU. He may not tolerate 3 hours of PT/OT/speech due to inconsistent command following and max encouragement required for participation in PT/OT today.   Safety Interventions: patient left in chair, chair alarm in place, call light within reach, gait belt, patient at risk for falls, and nurse notified    Plan  Frequency: 3-5 x/per week  Current Treatment Recommendations: strengthening,

## 2024-05-29 NOTE — PROGRESS NOTES
Hospitalist Progress Note    Name:  Maximo Camacho    /Age/Sex: 1950  (73 y.o. male)  MRN & CSN:  [id number] & [id number]    PCP: System, Referring Not In (Inactive)    Date of Admission: 2024    Patient Status:  Inpatient     Chief Complaint:   Chief Complaint   Patient presents with    Altered Mental Status     Pt brought in my  from home. Wife called 911 due to  being outside for 3 hours doing chores. Per wife pt hs hx of stroke and was acting \"off\", states he was acting like this previously when he had a stroke. Last known well unknown.       Hospital Course:   Patient mated for altered mental status.  Concern for stroke.  CT head nonacute.    MRI showed acute CVA.  PMR consulted.    Subjective:  Today is:  Hospital Day: 5.  Patient seen and examined in [id number]-[id number]/3361-01.     Sitting up in chair.  Doing okay.  Denies pain.    Son at bedside and updated.      Medications:  Reviewed    Infusion Medications    sodium chloride       Scheduled Medications    sacubitril-valsartan  1 tablet Oral BID    furosemide  20 mg Oral Daily    hydrALAZINE  25 mg Oral 3 times per day    aspirin  81 mg Oral Daily    atorvastatin  80 mg Oral Nightly    eplerenone  12.5 mg Oral Daily    metoprolol succinate  50 mg Oral Daily    pantoprazole  40 mg Oral QAM AC    sodium chloride flush  5-40 mL IntraVENous 2 times per day     PRN Meds: perflutren lipid microspheres, LORazepam, acetaminophen, sodium chloride flush, sodium chloride, ondansetron **OR** ondansetron, polyethylene glycol, perflutren lipid microspheres, potassium chloride **OR** potassium alternative oral replacement **OR** potassium chloride, magnesium sulfate    No intake or output data in the 24 hours ending 24 1058    Physical Exam Performed:    /69   Pulse 76   Temp 98 °F (36.7 °C) (Axillary)   Resp 16   Ht 1.727 m (5' 8\")   Wt 58.6 kg (129 lb 1.6 oz)   SpO2 97%   BMI 19.63 kg/m²     General appearance: No apparent

## 2024-05-29 NOTE — PLAN OF CARE
Problem: Discharge Planning  Goal: Discharge to home or other facility with appropriate resources  5/28/2024 2236 by Jennifer Kilpatrick RN  Outcome: Progressing  5/28/2024 1548 by Chon Freedman RN  Outcome: Progressing  Flowsheets (Taken 5/28/2024 0935)  Discharge to home or other facility with appropriate resources:   Identify barriers to discharge with patient and caregiver   Arrange for needed discharge resources and transportation as appropriate   Refer to discharge planning if patient needs post-hospital services based on physician order or complex needs related to functional status, cognitive ability or social support system     Problem: Safety - Adult  Goal: Free from fall injury  5/28/2024 2236 by Jennifer Kilpatrick RN  Outcome: Progressing  5/28/2024 1548 by Chon Freedman RN  Outcome: Progressing     Problem: Neurosensory - Adult  Goal: Achieves stable or improved neurological status  Outcome: Progressing     Problem: Cardiovascular - Adult  Goal: Maintains optimal cardiac output and hemodynamic stability  Outcome: Progressing  Flowsheets (Taken 5/28/2024 0935 by Chon Freedman RN)  Maintains optimal cardiac output and hemodynamic stability:   Monitor blood pressure and heart rate   Monitor urine output and notify Licensed Independent Practitioner for values outside of normal range     Problem: Musculoskeletal - Adult  Goal: Return mobility to safest level of function  Outcome: Progressing  Flowsheets (Taken 5/28/2024 0935 by Chon Freedman RN)  Return Mobility to Safest Level of Function:   Assess patient stability and activity tolerance for standing, transferring and ambulating with or without assistive devices   Assist with transfers and ambulation using safe patient handling equipment as needed     Problem: Pain  Goal: Verbalizes/displays adequate comfort level or baseline comfort level  Outcome: Progressing  Flowsheets (Taken 5/28/2024 2015)  Verbalizes/displays adequate comfort level or baseline comfort

## 2024-05-29 NOTE — CONSULTS
In patient Neurology consult        [facility] Neurology      MD Maximo Stahl  1950    Date of Service: 5/29/2024    Referring Physician: Nito Rae DO      Reason for the consult and CC: Acute CVA    Most the history is obtained from detailed chart review and discussion with patient's nurse.  Patient is not a good historian unable to provide me with accurate history.    HPI:   The patient is a 73 y.o.  years old male with past medical history of Alzheimer's dementia, remote strokes, hypertension, TBI history and other medical problems comes to the hospital with acute confusion.  Family noted patient was not acting like his normal self.  Degree severe duration persistent.  No aggravating relieving factors.  Family called EMS and patient was brought to the hospital.  In the emergency room CT head showed no acute intracranial abnormality.  No vascular studies were obtained during this time.  Patient was mated to the hospital further evaluation.  Patient had MRI brain yesterday which showed new left cerebellar ischemic stroke.    Chart reviewed, patient was recently seen  last month by our service.  Patient had MRI brain which showed acute right frontal lobe ischemic stroke, severe microvascular changes and remote left temporal lobe encephalomalacia.  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  Today patient is awake alert able to follow some direction.  He is noted to be unsteady going from bed to chair.  Review of system is limited.       Constitutional:   Vitals:    05/29/24 0330 05/29/24 0626 05/29/24 0705 05/29/24 0758   BP: 137/78 138/69 138/69    Pulse: 70  76    Resp: 16  16    Temp: 97.8 °F (36.6 °C)  98 °F (36.7 °C)    TempSrc: Axillary  Axillary    SpO2: 97%  97%    Weight:    58.6 kg (129 lb 1.6 oz)   Height:             I personally reviewed and updated social history, past medical history, medications, allergy, surgical history, and family history as

## 2024-05-29 NOTE — PROGRESS NOTES
Pt ambulated to restroom with assist x2 without use of any assistant devices. Pt was unsteady with assist x2, often leaning to one side and leaning forward.

## 2024-05-29 NOTE — PROGRESS NOTES
[facility] - Inpatient Rehabilitation Department   Phone: [phone]    Occupational Therapy    [x] Re-Evaluation            [] Daily Treatment Note         [] Discharge Summary      Patient: Maximo Camacho   : 1950   MRN: [id number]   Date of Service:  2024    Admitting Diagnosis:  Acute encephalopathy  Current Admission Summary: Maximo Camacho is a 73 y.o. male who presents to the emergency room due to family concern that the patient was acting abnormal.  Patient family is not at bedside currently on initial evaluation.  Wife called wanted to the  because hide for the past 3 hours doing chores and noted that he was acting off.  Patient does have a history of dementia diabetes.  Patient does have difficulty with hearing.  Here in the emergency room he states that he has some swelling of his feet but does not have any other acute complaints.     : MRI Small new acute infarct in the left cerebellum.       Past Medical History:  has a past medical history of Allergic rhinitis, Cerebral artery occlusion with cerebral infarction (HCC), Dementia (HCC), Hyperlipidemia, Hypertension, Injury brain, traumatic (HCC), and Polyp, stomach.  Past Surgical History:  has a past surgical history that includes Colonoscopy; Upper gastrointestinal endoscopy (08/15/2016); and Upper gastrointestinal endoscopy (N/A, 2021).    Discharge Recommendations: Maximo Camacho scored a 17/24 on the AM-PAC ADL Inpatient form. Current research shows that an AM-PAC score of 17 or less is typically not associated with a discharge to the patient's home setting. Based on the patient's AM-PAC score and their current ADL deficits, it is recommended that the patient have 3-5 sessions per week of Occupational Therapy at d/c to increase the patient's independence.  Please see assessment section for further patient specific details.    If patient discharges prior to next session this note will serve as a discharge  toileting at contact guard assistance   Patient will complete functional transfers at stand by assistance   Patient will complete functional mobility at contact guard assistance, LRAD   Patient will increase functional standing balance to SBA for improved ADL completion    Above goals reviewed on 5/29/2024.  All goals are ongoing at this time unless indicated above.       Therapy Session Time     Individual Group Co-treatment   Time In   1502   Time Out   1544   Minutes   42        Timed Code Treatment Minutes:    27 Minutes  Total Treatment Minutes:  42 Minutes       Electronically Signed By:  Yu VIVEROS OTR/L [id number]

## 2024-05-29 NOTE — PROGRESS NOTES
Physical Therapy/Occupational Therapy  Maximo Camacho  PT/OT attempted this afternoon. Pt is off the floor at this time to ECHO per RN. Will follow up as schedule allows.  Thanks, Karolyn Ralph, [id number]  Yu Basilio, OTR/L [id number]

## 2024-05-30 LAB
ALBUMIN SERPL-MCNC: 3.7 G/DL (ref 3.4–5)
ANION GAP SERPL CALCULATED.3IONS-SCNC: 7 MMOL/L (ref 3–16)
BUN SERPL-MCNC: 17 MG/DL (ref 7–20)
CALCIUM SERPL-MCNC: 9 MG/DL (ref 8.3–10.6)
CHLORIDE SERPL-SCNC: 101 MMOL/L (ref 99–110)
CO2 SERPL-SCNC: 32 MMOL/L (ref 21–32)
CREAT SERPL-MCNC: 1.3 MG/DL (ref 0.8–1.3)
DEPRECATED RDW RBC AUTO: 14.5 % (ref 12.4–15.4)
GFR SERPLBLD CREATININE-BSD FMLA CKD-EPI: 57 ML/MIN/{1.73_M2}
GLUCOSE SERPL-MCNC: 115 MG/DL (ref 70–99)
HCT VFR BLD AUTO: 36.1 % (ref 40.5–52.5)
HGB BLD-MCNC: 12.2 G/DL (ref 13.5–17.5)
MCH RBC QN AUTO: 30.2 PG (ref 26–34)
MCHC RBC AUTO-ENTMCNC: 33.8 G/DL (ref 31–36)
MCV RBC AUTO: 89.3 FL (ref 80–100)
PHOSPHATE SERPL-MCNC: 3 MG/DL (ref 2.5–4.9)
PLATELET # BLD AUTO: 164 K/UL (ref 135–450)
PMV BLD AUTO: 9 FL (ref 5–10.5)
POTASSIUM SERPL-SCNC: 4 MMOL/L (ref 3.5–5.1)
RBC # BLD AUTO: 4.04 M/UL (ref 4.2–5.9)
SODIUM SERPL-SCNC: 140 MMOL/L (ref 136–145)
WBC # BLD AUTO: 5.6 K/UL (ref 4–11)

## 2024-05-30 PROCEDURE — 36415 COLL VENOUS BLD VENIPUNCTURE: CPT

## 2024-05-30 PROCEDURE — 97535 SELF CARE MNGMENT TRAINING: CPT

## 2024-05-30 PROCEDURE — APPNB30 APP NON BILLABLE TIME 0-30 MINS

## 2024-05-30 PROCEDURE — 97116 GAIT TRAINING THERAPY: CPT

## 2024-05-30 PROCEDURE — 6370000000 HC RX 637 (ALT 250 FOR IP): Performed by: NURSE PRACTITIONER

## 2024-05-30 PROCEDURE — 6370000000 HC RX 637 (ALT 250 FOR IP): Performed by: INTERNAL MEDICINE

## 2024-05-30 PROCEDURE — 97530 THERAPEUTIC ACTIVITIES: CPT

## 2024-05-30 PROCEDURE — 85027 COMPLETE CBC AUTOMATED: CPT

## 2024-05-30 PROCEDURE — 99232 SBSQ HOSP IP/OBS MODERATE 35: CPT | Performed by: PSYCHIATRY & NEUROLOGY

## 2024-05-30 PROCEDURE — 2580000003 HC RX 258: Performed by: NURSE PRACTITIONER

## 2024-05-30 PROCEDURE — APPSS30 APP SPLIT SHARED TIME 16-30 MINUTES

## 2024-05-30 PROCEDURE — 6370000000 HC RX 637 (ALT 250 FOR IP): Performed by: STUDENT IN AN ORGANIZED HEALTH CARE EDUCATION/TRAINING PROGRAM

## 2024-05-30 PROCEDURE — 1200000000 HC SEMI PRIVATE

## 2024-05-30 PROCEDURE — 80069 RENAL FUNCTION PANEL: CPT

## 2024-05-30 RX ADMIN — PANTOPRAZOLE SODIUM 40 MG: 40 TABLET, DELAYED RELEASE ORAL at 05:19

## 2024-05-30 RX ADMIN — SACUBITRIL AND VALSARTAN 1 TABLET: 24; 26 TABLET, FILM COATED ORAL at 21:53

## 2024-05-30 RX ADMIN — FUROSEMIDE 20 MG: 20 TABLET ORAL at 09:44

## 2024-05-30 RX ADMIN — HYDRALAZINE HYDROCHLORIDE 25 MG: 25 TABLET ORAL at 05:19

## 2024-05-30 RX ADMIN — HYDRALAZINE HYDROCHLORIDE 25 MG: 25 TABLET ORAL at 17:19

## 2024-05-30 RX ADMIN — SODIUM CHLORIDE, PRESERVATIVE FREE 10 ML: 5 INJECTION INTRAVENOUS at 21:38

## 2024-05-30 RX ADMIN — ASPIRIN 81 MG 81 MG: 81 TABLET ORAL at 09:44

## 2024-05-30 RX ADMIN — SODIUM CHLORIDE, PRESERVATIVE FREE 10 ML: 5 INJECTION INTRAVENOUS at 09:44

## 2024-05-30 RX ADMIN — ATORVASTATIN CALCIUM 80 MG: 80 TABLET, FILM COATED ORAL at 21:49

## 2024-05-30 RX ADMIN — SACUBITRIL AND VALSARTAN 1 TABLET: 24; 26 TABLET, FILM COATED ORAL at 09:44

## 2024-05-30 RX ADMIN — EPLERENONE 12.5 MG: 25 TABLET, FILM COATED ORAL at 09:44

## 2024-05-30 RX ADMIN — METOPROLOL SUCCINATE 50 MG: 50 TABLET, EXTENDED RELEASE ORAL at 09:44

## 2024-05-30 RX ADMIN — HYDRALAZINE HYDROCHLORIDE 25 MG: 25 TABLET ORAL at 21:49

## 2024-05-30 ASSESSMENT — PAIN SCALES - PAIN ASSESSMENT IN ADVANCED DEMENTIA (PAINAD)
FACIALEXPRESSION: SMILING OR INEXPRESSIVE
BODYLANGUAGE: RELAXED
TOTALSCORE: 0
BREATHING: NORMAL
CONSOLABILITY: NO NEED TO CONSOLE
BREATHING: NORMAL
TOTALSCORE: 0
CONSOLABILITY: NO NEED TO CONSOLE
BODYLANGUAGE: RELAXED
FACIALEXPRESSION: SMILING OR INEXPRESSIVE

## 2024-05-30 NOTE — PROGRESS NOTES
[facility] - Inpatient Rehabilitation Department   Phone: [phone]    Occupational Therapy    [x] Re-Evaluation            [x] Daily Treatment Note         [] Discharge Summary      Patient: Maximo Camacho   : 1950   MRN: [id number]   Date of Service:  2024    Admitting Diagnosis:  CVA (cerebral vascular accident) (HCC)  Current Admission Summary: Maximo Camacho is a 73 y.o. male who presents to the emergency room due to family concern that the patient was acting abnormal.  Patient family is not at bedside currently on initial evaluation.  Wife called wanted to the  because hide for the past 3 hours doing chores and noted that he was acting off.  Patient does have a history of dementia diabetes.  Patient does have difficulty with hearing.  Here in the emergency room he states that he has some swelling of his feet but does not have any other acute complaints.     : MRI Small new acute infarct in the left cerebellum.       Past Medical History:  has a past medical history of Allergic rhinitis, Cerebral artery occlusion with cerebral infarction (HCC), Dementia (HCC), Hyperlipidemia, Hypertension, Injury brain, traumatic (HCC), and Polyp, stomach.  Past Surgical History:  has a past surgical history that includes Colonoscopy; Upper gastrointestinal endoscopy (08/15/2016); and Upper gastrointestinal endoscopy (N/A, 2021).    Discharge Recommendations: Maximo Camacho scored a 15/24 on the AM-PAC ADL Inpatient form. Current research shows that an AM-PAC score of 17 or less is typically not associated with a discharge to the patient's home setting. Based on the patient's AM-PAC score and their current ADL deficits, it is recommended that the patient have 3-5 sessions per week of Occupational Therapy at d/c to increase the patient's independence.  Please see assessment section for further patient specific details.    If patient discharges prior to next session this note will  assistance.    Light Housekeeping Comments: standing balance while leaning forward to wipe off toilet after using it; standing balance while wiping out sink after washing his hands.    Functional Mobility  Bed Mobility:  Supine to Sit: stand by assistance  Scooting: stand by assistance  Comments: HOB elevated, max encouragement  Transfers:  Sit to stand transfer:min from EOB, CGA from recliner  Stand to sit transfer: contact guard assistance  Comments: stands to FWW, forward flexed posture  Functional Mobility  Functional Mobility Activity: to/from bathroom, mobility in sigala  Device Use: rolling walker  Required Assistance: minimal assistance, moderate assistance  Comment: Very forward flexed and heavy UE lean on walker. Pt holding walker far ahead of him, despite max verbal cues and cues to slow down RW. L Lateral lean. Very poor balance on turn. Very narrow ONEIL, poor safety awareness.   Balance:  Static Sitting Balance: fair (+): maintains balance at SBA/supervision without use of UE support  Dynamic Sitting Balance: fair (+): maintains balance at SBA/supervision without use of UE support  Static Standing Balance: poor: requires mod (A) to maintain balance  Dynamic Standing Balance: poor: requires mod (A) to maintain balance  Comments: L lateral lean while ambulating, while seated in recliner.    Other Therapeutic Interventions    Functional Outcomes  AM-PAC Inpatient Daily Activity Raw Score: 15                                    Cognition  Overall Cognitive Status: Impaired  Following Commands: follows one step commands with repetition, follows one step commands with increased time, inconsistently follows commands  Memory: decreased recall of biographical information, decreased recall of precautions, decreased recall of recent events, decreased short term memory  Safety Judgement: decreased awareness of need for assistance, decreased awareness of need for safety  Problem Solving: assistance required to generate

## 2024-05-30 NOTE — PLAN OF CARE
Problem: Discharge Planning  Goal: Discharge to home or other facility with appropriate resources  Outcome: Progressing  Flowsheets (Taken 5/29/2024 0742 by Chon Freedman, MC)  Discharge to home or other facility with appropriate resources:   Identify barriers to discharge with patient and caregiver   Arrange for needed discharge resources and transportation as appropriate   Refer to discharge planning if patient needs post-hospital services based on physician order or complex needs related to functional status, cognitive ability or social support system     Problem: Safety - Adult  Goal: Free from fall injury  Outcome: Progressing

## 2024-05-30 NOTE — PROGRESS NOTES
Maximo Doug  Neurology Follow-up  [facility] Neurology    Date of Service: 5/30/2024    Subjective:   CC: Follow up today regarding: Acute CVA     Events noted. Chart and lab reviewed. Patient seen this morning resting in bed eating breakfast, in no acute distress. Pt complaining of sacral pain. Denies HA, dizziness, dysarthria, dysphagia.       ROS : A 10-12 system review obtained and updated today and is unremarkable except as mentioned  in my interval history.     Past medical history, social history, medication and family history reviewed.       Objective:  Exam:   Constitutional:   Vitals:    05/30/24 0515 05/30/24 0728 05/30/24 0754 05/30/24 1123   BP:  114/77  109/69   Pulse: 77 81  75   Resp: 17 18  17   Temp: 98.4 °F (36.9 °C) 98.3 °F (36.8 °C)  98.5 °F (36.9 °C)   TempSrc: Temporal Temporal  Oral   SpO2: 95% 96%  96%   Weight:   60 kg (132 lb 4.8 oz)    Height:         General appearance:  Normal development and appear in no acute distress.   Mental Status:   Oriented to person, place only  Memory: Poor immediate recall.  Intact remote memory  Poor attention span and concentration.  Language: Dysarthric speech, [place].  Poor fund of Knowledge.   Cranial Nerves:   II:   Pupils: equal, round, reactive to light  III,IV,VI: Extra Ocular Movements are intact. No nystagmus  V: Facial sensation is intact  VII: Facial strength and movements: intact and symmetric  XII: Tongue movements are normal  Musculoskeletal: 5/5 in all 4 extremities.   Tone: Normal tone.   Reflexes: Symmetric 2+ in both arms and legs.  Coordination: no pronator drift, no dysmetria with FNF  Sensation: normal.  Gait/Posture: Unsteady gait    MDM:      A. Problems (any 1)    High:    [x] Acute/Chronic Illness/injury posing threat to life or bodily function: Acute CVA   [] Severe exacerbation of chronic illness:      Moderate:    []     1 or more chronic illness with exacerbation, progression or side effect of treatment or  []     2 or more

## 2024-05-30 NOTE — CONSULTS
Maximo Camacho  5/29/2024  [id number]    Chief Complaint: Acute encephalopathy    Subjective   HPI: Maximo Camacho is a 73 y.o. male with PMHx notable for dementia, multiple prior CVA, TBI, HTN, HLD, known to PM&R from recent ARU admission for stroke rehab (4/14-19), who presented on 5/25 with confusion. MRI Brain demonstrated acute left cerebellar stroke. Echo showed LVEF 35-40%, negative bubble study. Neurology consulted, recommending ASA + statin.      Currently, ROS unable to be obtained due to clinical condition (poor communication and command follow). His family is present, are concerned with gait instability which seems worse today as compared to time of admission. They do not think he will be able to return home at his present level of function.     Past Medical History:   Diagnosis Date    Allergic rhinitis     Cerebral artery occlusion with cerebral infarction (HCC) 2016    Dementia (HCC)     Hyperlipidemia     Hypertension     Injury brain, traumatic (HCC)     Polyp, stomach        Past Surgical History:   Procedure Laterality Date    COLONOSCOPY      UPPER GASTROINTESTINAL ENDOSCOPY  08/15/2016    UPPER GASTROINTESTINAL ENDOSCOPY N/A 6/1/2021    EGD BIOPSY performed by Keanu Santana MD at [facility] ENDOSCOPY       Social History     Tobacco Use    Smoking status: Never    Smokeless tobacco: Never   Vaping Use    Vaping Use: Never used   Substance Use Topics    Alcohol use: No    Drug use: No       Prior Level of Function:   Independent with mobility, ADLs.   Lives with wife in one-level home with level entry.          Objective   Objective:  Patient Vitals for the past 24 hrs:   BP Temp Temp src Pulse Resp SpO2 Height Weight   05/29/24 1930 137/72 98.8 °F (37.1 °C) Temporal 73 16 97 % -- --   05/29/24 1305 138/69 -- -- -- -- -- 1.727 m (5' 8\") 58.5 kg (129 lb)   05/29/24 1145 (!) 154/79 98.7 °F (37.1 °C) Oral 74 14 96 % -- --   05/29/24 0758 -- -- -- -- -- -- -- 58.6 kg (129 lb 1.6 oz)   05/29/24 0705

## 2024-05-30 NOTE — PROGRESS NOTES
[facility] - Inpatient Rehabilitation Department   Phone: [phone]    Physical Therapy    [] Initial Evaluation            [x] Daily Treatment Note         [] Discharge Summary      Patient: Maximo Camacho   : 1950   MRN: [id number]   Date of Service:  2024  Admitting Diagnosis: CVA (cerebral vascular accident) (HCC)  Current Admission Summary: Maximo Camacho is a 73 y.o. male who presents to the emergency room due to family concern that the patient was acting abnormal.  Patient family is not at bedside currently on initial evaluation.  Wife called wanted to the  because hide for the past 3 hours doing chores and noted that he was acting off.  Patient does have a history of dementia diabetes.  Patient does have difficulty with hearing.  Here in the emergency room he states that he has some swelling of his feet but does not have any other acute complaints.    *Decline in balance and increased assistance required for ambulation 24.   Past Medical History:  has a past medical history of Allergic rhinitis, Cerebral artery occlusion with cerebral infarction (HCC), Dementia (HCC), Hyperlipidemia, Hypertension, Injury brain, traumatic (HCC), and Polyp, stomach.  Past Surgical History:  has a past surgical history that includes Colonoscopy; Upper gastrointestinal endoscopy (08/15/2016); and Upper gastrointestinal endoscopy (N/A, 2021).    Discharge Recommendations: Maximo Camacho scored a 13/24 on the AM-PAC short mobility form. Current research shows that an AM-PAC score of 17 or less is typically not associated with a discharge to the patient's home setting. Based on the patient's AM-PAC score and their current functional mobility deficits, it is recommended that the patient have 3-5 sessions per week of Physical Therapy at d/c to increase the patient's independence.  Please see assessment section for further patient specific details.    If patient discharges prior to next

## 2024-05-30 NOTE — CARE COORDINATION
YOAV informed from therapy that patient has declined physically.  Patient currently scoring SNF at this time.  Met with pt's son in the room who agreed with SNF stating that pt's spouse is not able to take care of pt in his current condition.  Provided son w/SNF list.  He chose [facility] as 1st option and [facility] as back up.  Called Angela in admissions who ran pt's benefits and accepted at [facility].  Precert started today by the facility.    Discharge Plan:  [facility] pending precert.    Electronically signed by BURTON Sykes, LORENZO on 5/30/2024 at 1:12 PM

## 2024-05-30 NOTE — DISCHARGE SUMMARY
distention. Trachea midline.  Respiratory:  Normal respiratory effort. Clear to auscultation, bilaterally without Rales/Wheezes/Rhonchi.  Cardiovascular: Regular rate and rhythm with normal S1/S2 without murmurs, rubs or gallops. No peripheral edema.  Abdomen: Soft, non-tender, non-distended with normal bowel sounds.  : No CVA tenderness.  Musculoskeletal: No clubbing or cyanosis.  Full range of motion without deformity.  Skin: Skin color, texture, turgor normal.  No rashes or lesions.  Neurologic:  Neurovascularly intact without any focal sensory/motor deficits. Cranial nerves: II-XII intact, grossly non-focal.  Psychiatric: Alert and oriented, thought content appropriate, normal insight  Capillary Refill: Brisk, 3 seconds, normal   Peripheral Pulses: +2 palpable, equal bilaterally       Labs: For convenience and continuity at follow-up the following most recent labs are provided:      CBC:    Lab Results   Component Value Date/Time    WBC 5.6 05/30/2024 04:52 AM    HGB 12.2 05/30/2024 04:52 AM    HCT 36.1 05/30/2024 04:52 AM     05/30/2024 04:52 AM       Renal:    Lab Results   Component Value Date/Time     05/30/2024 04:52 AM    K 4.0 05/30/2024 04:52 AM    K 3.4 05/26/2024 05:52 AM     05/30/2024 04:52 AM    CO2 32 05/30/2024 04:52 AM    BUN 17 05/30/2024 04:52 AM    CREATININE 1.3 05/30/2024 04:52 AM    CALCIUM 9.0 05/30/2024 04:52 AM    PHOS 3.0 05/30/2024 04:52 AM         Significant Diagnostic Studies    Radiology:   MRI BRAIN WO CONTRAST   Preliminary Result   Small new acute infarct in the left cerebellum.         CT CHEST W CONTRAST   Final Result   1. No acute process.   2. Aneurysm of the ascending thoracic aorta to 5.2 cm.         XR CHEST PORTABLE   Final Result   1. Indistinct perihilar vasculature. Differential includes interstitial edema   versus atypical infection.   2. Stable mild enlargement of the cardiac silhouette.         CT Head W/O Contrast   Final Result   Chronic  findings in the brain without acute CT abnormality identified.                Consults:     IP CONSULT TO SOCIAL WORK  IP CONSULT TO HOME CARE NEEDS  IP CONSULT TO NEUROLOGY  IP CONSULT TO PHYSICAL MEDICINE REHAB    F/U APPTS:  No follow-up provider specified.    Diet:  cardiac diet     Activity:  activity as tolerated    Disposition:  Discharged to [facility]    Rehab: Patient is going to rehab at home    Condition at Discharge: Stable    Code Status:  Full Code     Discharge Medications:     Current Discharge Medication List             Details   aspirin 81 MG chewable tablet Take 1 tablet by mouth daily  Qty: 90 tablet, Refills: 0      atorvastatin (LIPITOR) 80 MG tablet Take 1 tablet by mouth nightly  Qty: 90 tablet, Refills: 0      omeprazole (PRILOSEC) 20 MG delayed release capsule Take 1 capsule by mouth Daily  Qty: 30 capsule, Refills: 0      acetaminophen (TYLENOL) 500 MG tablet Take 1 tablet by mouth every 6 hours as needed for Pain      empagliflozin (JARDIANCE) 10 MG tablet Take 1 tablet by mouth daily      melatonin 3 MG TABS tablet Take 1 tablet by mouth nightly as needed (sleep)      sacubitril-valsartan (ENTRESTO) 24-26 MG per tablet Take 1 tablet by mouth 2 times daily      metoprolol succinate (TOPROL XL) 50 MG extended release tablet Take 1 tablet by mouth daily      Multiple Vitamins-Minerals (ALIVE ENERGY 50+) TABS Take 1 tablet by mouth Daily      eplerenone (INSPRA) 25 MG tablet Take 0.5 tablets by mouth daily             Total time spent on discharge was 31 minutes in the examination, evaluation, counseling and review of medications and discharge plan.      Signed:    Nito Rae DO   5/30/2024  8:57 AM

## 2024-05-30 NOTE — PLAN OF CARE
Problem: Discharge Planning  Goal: Discharge to home or other facility with appropriate resources  Outcome: Completed     Problem: Safety - Adult  Goal: Free from fall injury  Outcome: Completed     Problem: Neurosensory - Adult  Goal: Achieves stable or improved neurological status  Outcome: Adequate for Discharge     Problem: Cardiovascular - Adult  Goal: Maintains optimal cardiac output and hemodynamic stability  Outcome: Adequate for Discharge     Problem: Musculoskeletal - Adult  Goal: Return mobility to safest level of function  Outcome: Adequate for Discharge     Problem: Pain  Goal: Verbalizes/displays adequate comfort level or baseline comfort level  Outcome: Completed     Problem: Skin/Tissue Integrity  Goal: Absence of new skin breakdown  Description: 1.  Monitor for areas of redness and/or skin breakdown  2.  Assess vascular access sites hourly  3.  Every 4-6 hours minimum:  Change oxygen saturation probe site  4.  Every 4-6 hours:  If on nasal continuous positive airway pressure, respiratory therapy assess nares and determine need for appliance change or resting period.  Outcome: Completed

## 2024-05-30 NOTE — PROGRESS NOTES
Attempted to call pt's son listed in chart (Eagle Mckeon at [phone]) x2 to inform of discharge order and if can transport pt home but no response. Charge RN notified. Will attempt again later.

## 2024-05-31 VITALS
HEIGHT: 68 IN | RESPIRATION RATE: 18 BRPM | OXYGEN SATURATION: 98 % | BODY MASS INDEX: 19.97 KG/M2 | DIASTOLIC BLOOD PRESSURE: 74 MMHG | WEIGHT: 131.8 LBS | SYSTOLIC BLOOD PRESSURE: 147 MMHG | TEMPERATURE: 98 F | HEART RATE: 71 BPM

## 2024-05-31 LAB
ALBUMIN SERPL-MCNC: 3.9 G/DL (ref 3.4–5)
ANION GAP SERPL CALCULATED.3IONS-SCNC: 11 MMOL/L (ref 3–16)
BUN SERPL-MCNC: 19 MG/DL (ref 7–20)
CALCIUM SERPL-MCNC: 9.1 MG/DL (ref 8.3–10.6)
CHLORIDE SERPL-SCNC: 101 MMOL/L (ref 99–110)
CO2 SERPL-SCNC: 28 MMOL/L (ref 21–32)
CREAT SERPL-MCNC: 1.2 MG/DL (ref 0.8–1.3)
DEPRECATED RDW RBC AUTO: 14.2 % (ref 12.4–15.4)
GFR SERPLBLD CREATININE-BSD FMLA CKD-EPI: 63 ML/MIN/{1.73_M2}
GLUCOSE SERPL-MCNC: 112 MG/DL (ref 70–99)
HCT VFR BLD AUTO: 35.6 % (ref 40.5–52.5)
HGB BLD-MCNC: 12.4 G/DL (ref 13.5–17.5)
MCH RBC QN AUTO: 31.1 PG (ref 26–34)
MCHC RBC AUTO-ENTMCNC: 34.7 G/DL (ref 31–36)
MCV RBC AUTO: 89.6 FL (ref 80–100)
PHOSPHATE SERPL-MCNC: 2.8 MG/DL (ref 2.5–4.9)
PLATELET # BLD AUTO: 167 K/UL (ref 135–450)
PMV BLD AUTO: 8.7 FL (ref 5–10.5)
POTASSIUM SERPL-SCNC: 4.2 MMOL/L (ref 3.5–5.1)
RBC # BLD AUTO: 3.97 M/UL (ref 4.2–5.9)
SODIUM SERPL-SCNC: 140 MMOL/L (ref 136–145)
WBC # BLD AUTO: 6.3 K/UL (ref 4–11)

## 2024-05-31 PROCEDURE — 6370000000 HC RX 637 (ALT 250 FOR IP): Performed by: INTERNAL MEDICINE

## 2024-05-31 PROCEDURE — 6370000000 HC RX 637 (ALT 250 FOR IP): Performed by: STUDENT IN AN ORGANIZED HEALTH CARE EDUCATION/TRAINING PROGRAM

## 2024-05-31 PROCEDURE — 51798 US URINE CAPACITY MEASURE: CPT

## 2024-05-31 PROCEDURE — 92526 ORAL FUNCTION THERAPY: CPT

## 2024-05-31 PROCEDURE — 36415 COLL VENOUS BLD VENIPUNCTURE: CPT

## 2024-05-31 PROCEDURE — 97535 SELF CARE MNGMENT TRAINING: CPT

## 2024-05-31 PROCEDURE — 2580000003 HC RX 258: Performed by: NURSE PRACTITIONER

## 2024-05-31 PROCEDURE — 85027 COMPLETE CBC AUTOMATED: CPT

## 2024-05-31 PROCEDURE — 97530 THERAPEUTIC ACTIVITIES: CPT

## 2024-05-31 PROCEDURE — 80069 RENAL FUNCTION PANEL: CPT

## 2024-05-31 PROCEDURE — 6370000000 HC RX 637 (ALT 250 FOR IP): Performed by: NURSE PRACTITIONER

## 2024-05-31 RX ADMIN — HYDRALAZINE HYDROCHLORIDE 25 MG: 25 TABLET ORAL at 06:20

## 2024-05-31 RX ADMIN — EPLERENONE 12.5 MG: 25 TABLET, FILM COATED ORAL at 11:12

## 2024-05-31 RX ADMIN — METOPROLOL SUCCINATE 50 MG: 50 TABLET, EXTENDED RELEASE ORAL at 11:12

## 2024-05-31 RX ADMIN — ASPIRIN 81 MG 81 MG: 81 TABLET ORAL at 11:12

## 2024-05-31 RX ADMIN — HYDRALAZINE HYDROCHLORIDE 25 MG: 25 TABLET ORAL at 15:38

## 2024-05-31 RX ADMIN — FUROSEMIDE 20 MG: 20 TABLET ORAL at 11:12

## 2024-05-31 RX ADMIN — LORAZEPAM 0.5 MG: 0.5 TABLET ORAL at 00:37

## 2024-05-31 RX ADMIN — SACUBITRIL AND VALSARTAN 1 TABLET: 24; 26 TABLET, FILM COATED ORAL at 11:12

## 2024-05-31 RX ADMIN — SODIUM CHLORIDE, PRESERVATIVE FREE 10 ML: 5 INJECTION INTRAVENOUS at 11:12

## 2024-05-31 RX ADMIN — PANTOPRAZOLE SODIUM 40 MG: 40 TABLET, DELAYED RELEASE ORAL at 06:20

## 2024-05-31 ASSESSMENT — PAIN SCALES - PAIN ASSESSMENT IN ADVANCED DEMENTIA (PAINAD)
CONSOLABILITY: NO NEED TO CONSOLE
BREATHING: NORMAL
BODYLANGUAGE: RELAXED
FACIALEXPRESSION: SMILING OR INEXPRESSIVE
TOTALSCORE: 0

## 2024-05-31 NOTE — PROGRESS NOTES
[facility] - Inpatient Rehabilitation Department   Phone: [phone]    Physical Therapy    [] Initial Evaluation            [x] Daily Treatment Note         [] Discharge Summary      Patient: Maximo Camacho   : 1950   MRN: [id number]   Date of Service:  2024  Admitting Diagnosis: CVA (cerebral vascular accident) (HCC)  Current Admission Summary: Maximo Camacho is a 73 y.o. male who presents to the emergency room due to family concern that the patient was acting abnormal.  Patient family is not at bedside currently on initial evaluation.  Wife called wanted to the  because hide for the past 3 hours doing chores and noted that he was acting off.  Patient does have a history of dementia diabetes.  Patient does have difficulty with hearing.  Here in the emergency room he states that he has some swelling of his feet but does not have any other acute complaints.    *Decline in balance and increased assistance required for ambulation 24.   Past Medical History:  has a past medical history of Allergic rhinitis, Cerebral artery occlusion with cerebral infarction (HCC), Dementia (HCC), Hyperlipidemia, Hypertension, Injury brain, traumatic (HCC), and Polyp, stomach.  Past Surgical History:  has a past surgical history that includes Colonoscopy; Upper gastrointestinal endoscopy (08/15/2016); and Upper gastrointestinal endoscopy (N/A, 2021).    Discharge Recommendations: Maximo Camacho scored a 8/24 on the AM-PAC short mobility form. Current research shows that an AM-PAC score of 17 or less is typically not associated with a discharge to the patient's home setting. Based on the patient's AM-PAC score and their current functional mobility deficits, it is recommended that the patient have 3-5 sessions per week of Physical Therapy at d/c to increase the patient's independence.  Please see assessment section for further patient specific details.    If patient discharges prior to next

## 2024-05-31 NOTE — DISCHARGE SUMMARY
Report called to [facility], spoke with nursing (Lucy). All questions answered and informed of pt transport time of 4:30 pm today, acknowledged. Also updated pt's son (Eagle) who is at bedside.

## 2024-05-31 NOTE — PLAN OF CARE
Problem: Hematologic - Adult  Goal: Maintains hematologic stability  Outcome: Progressing     Problem: Pain  Goal: Verbalizes/displays adequate comfort level or baseline comfort level  Outcome: Completed      05/30/24 2350   Vitals   Temp 97.9 °F (36.6 °C)   Temp Source Oral   Pulse 70   Heart Rate Source Monitor   Respirations 15   /70   MAP (Calculated) 82   BP Location Left upper arm   BP Upper/Lower Upper   BP Method Automatic   Patient Position Semi fowlers   Cardiac Rhythm Sinus rhythm   Pain Assessment   Pain Assessment Pain Assessment in Advanced Dementia (PAINAD)   Pain Assessment in Advanced Dementia (PAINAD)   Breathing Independent of Vocalization 0   Negative Vocalization 0   Facial Expression 0   Body Language 0   Consolability 0   PAINAD Score 0   Oxygen Therapy   SpO2 97 %   Pulse Oximetry Type Intermittent   Pulse Oximeter Device Location Finger   O2 Device None (Room air)

## 2024-05-31 NOTE — PLAN OF CARE
Problem: Hematologic - Adult  Goal: Maintains hematologic stability  Outcome: Progressing      05/31/24 0350   Vitals   Temp 98.6 °F (37 °C)   Temp Source Oral   Pulse 77   Heart Rate Source Brachial;Radial   Respirations 14   /73   MAP (Calculated) 89   BP Location Left upper arm   BP Upper/Lower Upper   BP Method Automatic   Patient Position High fowlers;Turns self   Cardiac Rhythm Sinus rhythm   Pain Assessment   Pain Assessment Pain Assessment in Advanced Dementia (PAINAD)   Pain Assessment in Advanced Dementia (PAINAD)   Breathing Independent of Vocalization 0   Negative Vocalization 0   Facial Expression 0   Body Language 0   Consolability 0   PAINAD Score 0   Opioid-Induced Sedation   POSS Score 1   Oxygen Therapy   SpO2 96 %   Pulse Oximetry Type Intermittent   Pulse Oximeter Device Location Finger   O2 Device None (Room air)

## 2024-05-31 NOTE — DISCHARGE SUMMARY
Hospital Medicine Discharge Summary    Name:  Maximo Camacho  Gender: male  : 1950  73 y.o.  MRN: [id number]    PCP: System, Referring Not In (Inactive)     Date of Admission:  2024  8:31 PM  Discharge Date: 2024    Admitting Physician: Jae Caal DO  Discharge Physician: Nito Rae DO    Communication to PCP  -CVA seen on MRI  -DC to SNF      Discharge Diagnoses:       Active Hospital Problems    Diagnosis     DM (diabetes mellitus), secondary, with complications (HCC) [E13.8]     HFrEF (heart failure with reduced ejection fraction) (HCC) [I50.20]     Hyperlipidemia [E78.5]     CVA (cerebral vascular accident) (HCC) [I63.9]     Acute encephalopathy [G93.40]     Acute ischemic stroke (HCC) [I63.9]     HTN (hypertension), benign [I10]     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    Hospital Course:  Maximo Camacho is a 73 y.o. year old male who presented to [facility] on 2024  8:31 PM.      Patient mated for altered mental status.  Concern for stroke.  CT head nonacute.     MRI showed acute CVA.  PMR consulted, patient deemed not an ARU candidate.     Patient to discharge to SNF. Accepted at [facility].    On the last day of hospital stay, patient was eating lunch. Denied pain. Son at bedside and updated..  The patient expressed appropriate understanding of and agreement with the discharge recommendations, medications, and plan.      Physical Exam Performed:     BP (!) 147/74   Pulse 71   Temp 98 °F (36.7 °C) (Oral)   Resp 18   Ht 1.727 m (5' 8\")   Wt 59.8 kg (131 lb 12.8 oz)   SpO2 98%   BMI 20.04 kg/m²       General appearance: No apparent distress, appears stated age and cooperative.  Has some dementia at baseline.  HEENT: Pupils equal, round, and  mild enlargement of the cardiac silhouette.         CT Head W/O Contrast   Final Result   Chronic findings in the brain without acute CT abnormality identified.                Consults:     IP CONSULT TO SOCIAL WORK  IP CONSULT TO HOME CARE NEEDS  IP CONSULT TO NEUROLOGY  IP CONSULT TO PHYSICAL MEDICINE REHAB    F/U APPTS:  No follow-up provider specified.    Diet:  regular diet     Activity:  activity as tolerated    Disposition:  Discharged to [facility]    Rehab: Patient is going to rehab at [facility]    Condition at Discharge: Stable    Code Status:  Full Code     Discharge Medications:     Current Discharge Medication List             Details   aspirin 81 MG chewable tablet Take 1 tablet by mouth daily  Qty: 90 tablet, Refills: 0      atorvastatin (LIPITOR) 80 MG tablet Take 1 tablet by mouth nightly  Qty: 90 tablet, Refills: 0      omeprazole (PRILOSEC) 20 MG delayed release capsule Take 1 capsule by mouth Daily  Qty: 30 capsule, Refills: 0      acetaminophen (TYLENOL) 500 MG tablet Take 1 tablet by mouth every 6 hours as needed for Pain      empagliflozin (JARDIANCE) 10 MG tablet Take 1 tablet by mouth daily      melatonin 3 MG TABS tablet Take 1 tablet by mouth nightly as needed (sleep)      sacubitril-valsartan (ENTRESTO) 24-26 MG per tablet Take 1 tablet by mouth 2 times daily      metoprolol succinate (TOPROL XL) 50 MG extended release tablet Take 1 tablet by mouth daily      Multiple Vitamins-Minerals (ALIVE ENERGY 50+) TABS Take 1 tablet by mouth Daily      eplerenone (INSPRA) 25 MG tablet Take 0.5 tablets by mouth daily             Total time spent on discharge was 32 minutes in the examination, evaluation, counseling and review of medications and discharge plan.      Signed:    Nito Rae DO   5/31/2024  1:44 PM

## 2024-05-31 NOTE — PROGRESS NOTES
[facility] - Inpatient Rehabilitation Department   Phone: [phone]    Occupational Therapy    [] Re-Evaluation            [x] Daily Treatment Note         [] Discharge Summary      Patient: Maximo Camacho   : 1950   MRN: [id number]   Date of Service:  2024    Admitting Diagnosis:  CVA (cerebral vascular accident) (HCC)  Current Admission Summary: Maximo Camacho is a 73 y.o. male who presents to the emergency room due to family concern that the patient was acting abnormal.  Patient family is not at bedside currently on initial evaluation.  Wife called wanted to the  because hide for the past 3 hours doing chores and noted that he was acting off.  Patient does have a history of dementia diabetes.  Patient does have difficulty with hearing.  Here in the emergency room he states that he has some swelling of his feet but does not have any other acute complaints.     : MRI Small new acute infarct in the left cerebellum.       Past Medical History:  has a past medical history of Allergic rhinitis, Cerebral artery occlusion with cerebral infarction (HCC), Dementia (HCC), Hyperlipidemia, Hypertension, Injury brain, traumatic (HCC), and Polyp, stomach.  Past Surgical History:  has a past surgical history that includes Colonoscopy; Upper gastrointestinal endoscopy (08/15/2016); and Upper gastrointestinal endoscopy (N/A, 2021).    Discharge Recommendations: Maximo Camacho scored a 13/24 on the AM-PAC ADL Inpatient form. Current research shows that an AM-PAC score of 17 or less is typically not associated with a discharge to the patient's home setting. Based on the patient's AM-PAC score and their current ADL deficits, it is recommended that the patient have 3-5 sessions per week of Occupational Therapy at d/c to increase the patient's independence.  Please see assessment section for further patient specific details.    If patient discharges prior to next session this note will  ADL status, decreased safety awareness, decreased cognition, decreased endurance, decreased balance, decreased coordination  Prognosis: good  Clinical Assessment: 73 year male functioning below baseline,  s/p L CVA. Pt present with above deficits. Significant change in functional status since initial session, mainly limited by impaired cognition/command follow, insight, and balance deficits. Pt requires Mod A-mod Ax2 for static and dynamic standing balance for ADLs and functional mobility. He demonstrates decreased safety awareness and disregards safety cues. Continued skilled occupational therapy services recommended to facilitate return to PLOF and increase independence.  Safety Interventions: patient left in chair, chair alarm in place, call light within reach, gait belt, patient at risk for falls, telesitter in use, and nurse notified    Plan  Frequency: 5-7 x/week  Current Treatment Recommendations: balance training, functional mobility training, transfer training, neuromuscular re-education, patient/caregiver education, ADL/self-care training, and cognitive reorientation    Goals  Patient Goals: none stated   Short Term Goals:  Time Frame: discharge  Patient will complete upper body ADL at supervision   Patient will complete lower body ADL at contact guard assistance   Patient will complete toileting at contact guard assistance   Patient will complete functional transfers at stand by assistance   Patient will complete functional mobility at contact guard assistance, LRAD   Patient will increase functional standing balance to SBA for improved ADL completion    Above goals reviewed on 5/31/2024.  All goals are ongoing at this time unless indicated above.       Therapy Session Time     Individual Group Co-treatment   Time In   1120   Time Out   1144   Minutes   24        Timed Code Treatment Minutes:    24 Minutes  Total Treatment Minutes:  24 Minutes       Electronically Signed By: Yu VIVEROS OTR/L

## 2024-05-31 NOTE — CARE COORDINATION
SW informed that pt's precert was approved for [facility].  Transport was set with [facility] at 4:30pm today.  Discharge packet completed.  HENS completed.  RN, facility and son informed of discharge plan and time.  All documents faxed.    Discharge Plan:  [facility]  [address] [place] [place]  [address]  Report: [phone]  Fax: [phone]    [facility] at 4:30pm today    Electronically signed by BURTON Sykes, HERMELINDAW on 5/31/2024 at 2:12 PM

## 2024-05-31 NOTE — PROGRESS NOTES
1400: [employer] message sent to Attending \"Tried to get patient back in bed and he is drifting to the left, couldn't move his legs. We have him back in bed now but the son is saying that he seems worse than yesterday. PT/OT is here as well and saying he seems worse than when they worked with him earlier today too \"    1409: Response from Attending \"He's had a stroke and he's going to SNF. Nothing else I'm going to do at this time \"

## 2024-05-31 NOTE — PLAN OF CARE
Problem: Discharge Planning  Goal: Discharge to home or other facility with appropriate resources  Outcome: Completed     Problem: Safety - Adult  Goal: Free from fall injury  Outcome: Completed     Problem: Neurosensory - Adult  Goal: Achieves stable or improved neurological status  Outcome: Adequate for Discharge     Problem: Cardiovascular - Adult  Goal: Maintains optimal cardiac output and hemodynamic stability  Outcome: Completed     Problem: Musculoskeletal - Adult  Goal: Return mobility to safest level of function  Outcome: Adequate for Discharge     Problem: Hematologic - Adult  Goal: Maintains hematologic stability  Outcome: Completed     Problem: Pain  Goal: Verbalizes/displays adequate comfort level or baseline comfort level  Outcome: Completed     Problem: Skin/Tissue Integrity  Goal: Absence of new skin breakdown  Description: 1.  Monitor for areas of redness and/or skin breakdown  2.  Assess vascular access sites hourly  3.  Every 4-6 hours minimum:  Change oxygen saturation probe site  4.  Every 4-6 hours:  If on nasal continuous positive airway pressure, respiratory therapy assess nares and determine need for appliance change or resting period.  Outcome: Completed

## 2024-05-31 NOTE — PROGRESS NOTES
Facility/Department: [address]  Speech Language Pathology   Dysphagia Treatment Note    Patient: Maximo Camacho   : 1950   MRN: [id number]      Evaluation Date: 2024      Admitting Dx: History of CVA (cerebrovascular accident) [Z86.73]  Elevated brain natriuretic peptide (BNP) level [R79.89]  Acute encephalopathy [G93.40]  Altered mental status, unspecified altered mental status type [R41.82]  Cerebrovascular accident (CVA), unspecified mechanism (HCC) [I63.9]  Treatment Diagnosis: Oropharyngeal Dysphagia   Pain: Did not state                                              Diet and Treatment Recommendations 2024:  Diet Solids Recommendation:  Dysphagia II Minced and Moist  Liquid Consistency Recommendation:  Thin liquids  Recommended form of Meds: Meds in puree         Compensatory strategies:   Upright as possible with all PO intake , Assist Feed , Small bites/sips , Swallow 2 times per bite , Eat/feed slowly, Remain upright 30-45 min     Assessment of Texture Tolerance:  Diet level prior to treatment: Dysphagia III Soft and bite sized , Thin liquids   Tolerance of Current Diet Level:Per chart, no noted difficulty with current diet level . RN reported hiccups following breakfast meal earlier this date.     Impressions: Pt was positioned Upright in bed , required ongoing cueing to maintain alertness . Pt did not respond verbally or follow single step commands during session this date, RN notified as this therapist is unfamiliar with Pt's prior function.  Limited eye contact noted with flat affect. Currently on room air. Trials of thin liquids and soft solids  were provided to assess swallow function. Total feeding assistance was required. Prolonged mastication was noted with soft solids with intermittent cues required to completely clear oral cavity of bolus. Suspect lethargy and reduced attention contributed. Thin liquids via straw revealed suspected premature bolus loss to pharynx with

## 2024-05-31 NOTE — PLAN OF CARE
Problem: Hematologic - Adult  Goal: Maintains hematologic stability  Outcome: Progressing      05/30/24 1932   Vitals   Temp 98.2 °F (36.8 °C)   Temp Source Oral   Pulse 73   Heart Rate Source Monitor   Respirations 14   BP (!) 121/57   MAP (Calculated) 78   BP Location Left upper arm   BP Method Automatic   Patient Position Semi fowlers   Oxygen Therapy   SpO2 96 %   Pulse Oximeter Device Mode Intermittent   Pulse Oximeter Device Location Finger   O2 Device None (Room air)

## 2024-05-31 NOTE — PLAN OF CARE
Problem: Pain  Goal: Verbalizes/displays adequate comfort level or baseline comfort level  Outcome: Completed      05/30/24 3680   Pain Assessment   Pain Assessment Pain Assessment in Advanced Dementia (PAINAD)   Pain Assessment in Advanced Dementia (PAINAD)   Breathing Independent of Vocalization 0   Negative Vocalization 0   Facial Expression 0   Body Language 0   Consolability 0   PAINAD Score 0

## 2024-05-31 NOTE — PROGRESS NOTES
Hospitalist Progress Note    Name:  Maximo Camacho    /Age/Sex: 1950  (73 y.o. male)  MRN & CSN:  [id number] & [id number]    PCP: System, Referring Not In (Inactive)    Date of Admission: 2024    Patient Status:  Inpatient     Chief Complaint:   Chief Complaint   Patient presents with    Altered Mental Status     Pt brought in my  from home. Wife called 911 due to  being outside for 3 hours doing chores. Per wife pt hs hx of stroke and was acting \"off\", states he was acting like this previously when he had a stroke. Last known well unknown.       Hospital Course:   Patient mated for altered mental status.  Concern for stroke.  CT head nonacute.    MRI showed acute CVA.  PMR consulted, patient deemed not an ARU candidate.    Patient to discharge to SNF. Working on placement.    Subjective:  Today is:  Hospital Day: 7.  Patient seen and examined in 3TN-3361/3361-01.     In bed.  Sleepy.  Denies pain.      Medications:  Reviewed    Infusion Medications    sodium chloride       Scheduled Medications    sacubitril-valsartan  1 tablet Oral BID    furosemide  20 mg Oral Daily    hydrALAZINE  25 mg Oral 3 times per day    aspirin  81 mg Oral Daily    atorvastatin  80 mg Oral Nightly    eplerenone  12.5 mg Oral Daily    metoprolol succinate  50 mg Oral Daily    pantoprazole  40 mg Oral QAM AC    sodium chloride flush  5-40 mL IntraVENous 2 times per day     PRN Meds: perflutren lipid microspheres, LORazepam, acetaminophen, sodium chloride flush, sodium chloride, ondansetron **OR** ondansetron, polyethylene glycol, perflutren lipid microspheres, potassium chloride **OR** potassium alternative oral replacement **OR** potassium chloride, magnesium sulfate      Intake/Output Summary (Last 24 hours) at 2024 1306  Last data filed at 2024 2138  Gross per 24 hour   Intake 10 ml   Output --   Net 10 ml       Physical Exam Performed:    BP (!) 147/74   Pulse 71   Temp 98 °F (36.7 °C)  BRAIN WO CONTRAST   Final Result   Small new acute infarct in the left cerebellum.         CT CHEST W CONTRAST   Final Result   1. No acute process.   2. Aneurysm of the ascending thoracic aorta to 5.2 cm.         XR CHEST PORTABLE   Final Result   1. Indistinct perihilar vasculature. Differential includes interstitial edema   versus atypical infection.   2. Stable mild enlargement of the cardiac silhouette.         CT Head W/O Contrast   Final Result   Chronic findings in the brain without acute CT abnormality identified.                 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 male who presented to [facility] on 5/25/2024 and is being treated for:    Acute encephalopathy - improved  Dementia  CVA  -CT head nonacute  -MRI head shows acute CVA  -NIH of 0 on admission  -Aspirin and statin  -Daily labs; replace electrolytes as needed  -Neurology consulted  -ARU consulted - not an ARU candidate  -Working on SNF placement    Hypertension  -Continue home antihypertensives    Hyperlipidemia  -Continue home statin    HFrEF  -Not in acute exacerbation  -Echo 3/8/2024 shows LVEF 30-35% with diffuse hypokinesis  -No need to repeat echo at this time  -Continue home Toprol, Entresto, and eplerenone  -I/Os        Discussed management of the case with neurology who recommended MRI    Drugs that require monitoring for toxicity include:  lasix  and the method of monitoring was/is Electrolyte panel    DVT ppx: None/patient ambulating -

## 2025-06-13 NOTE — PROGRESS NOTES
Hospitalist Progress Note    Name:  Maximo Camacho    /Age/Sex: 1950  (73 y.o. male)  MRN & CSN:  [id number] & [id number]    PCP: System, Referring Not In (Inactive)    Date of Admission: 2024    Patient Status:  Inpatient     Chief Complaint:   Chief Complaint   Patient presents with    Altered Mental Status     Pt brought in my  from home. Wife called 911 due to  being outside for 3 hours doing chores. Per wife pt hs hx of stroke and was acting \"off\", states he was acting like this previously when he had a stroke. Last known well unknown.       Hospital Course:   Patient mated for altered mental status.  Concern for stroke.  CT head nonacute.    MRI showed acute CVA.  PMR consulted, patient deemed not an ARU candidate.    Patient to discharge to SNF. Son working on picking placement.    Subjective:  Today is:  Hospital Day: 6.  Patient seen and examined in 3TN-3361/3361-01.     In bed.  Sleepy.  Denies pain.      Medications:  Reviewed    Infusion Medications    sodium chloride       Scheduled Medications    sacubitril-valsartan  1 tablet Oral BID    furosemide  20 mg Oral Daily    hydrALAZINE  25 mg Oral 3 times per day    aspirin  81 mg Oral Daily    atorvastatin  80 mg Oral Nightly    eplerenone  12.5 mg Oral Daily    metoprolol succinate  50 mg Oral Daily    pantoprazole  40 mg Oral QAM AC    sodium chloride flush  5-40 mL IntraVENous 2 times per day     PRN Meds: perflutren lipid microspheres, LORazepam, acetaminophen, sodium chloride flush, sodium chloride, ondansetron **OR** ondansetron, polyethylene glycol, perflutren lipid microspheres, potassium chloride **OR** potassium alternative oral replacement **OR** potassium chloride, magnesium sulfate    No intake or output data in the 24 hours ending 24 1210    Physical Exam Performed:    /69   Pulse 75   Temp 98.5 °F (36.9 °C) (Oral)   Resp 17   Ht 1.727 m (5' 8\")   Wt 60 kg (132 lb 4.8 oz)   SpO2 96%    Eric Gimenez - Child - [phone]    Secondary Decision Maker: PernellQuinton - Child - [phone]  Sized  Code Status: Full Code    PT/OT Eval Status: Ordered    Disposition:  Patient to discharge to SNF when able. Working on precert.        Nito Rae DO  5/30/2024  12:10 PM

## (undated) DEVICE — BW-412T DISP COMBO CLEANING BRUSH: Brand: SINGLE USE COMBINATION CLEANING BRUSH

## (undated) DEVICE — MOUTHPIECE ENDOSCP L CTRL OPN AND SIDE PORTS DISP

## (undated) DEVICE — PROCEDURE KIT ENDOSCP CUST

## (undated) DEVICE — SET VLV 3 PC AWS DISPOSABLE GRDIAN SCOPEVALET

## (undated) DEVICE — GOWN AURORA NONREINF LG: Brand: MEDLINE INDUSTRIES, INC.

## (undated) DEVICE — SOLUTION IV IRRIG WATER 500ML POUR BRL ST 2F7113

## (undated) DEVICE — FORCEPS BX L240CM WRK CHN 2.8MM STD CAP W/ NDL MIC MESH